# Patient Record
Sex: FEMALE | Race: BLACK OR AFRICAN AMERICAN | NOT HISPANIC OR LATINO | Employment: UNEMPLOYED | URBAN - METROPOLITAN AREA
[De-identification: names, ages, dates, MRNs, and addresses within clinical notes are randomized per-mention and may not be internally consistent; named-entity substitution may affect disease eponyms.]

---

## 2017-08-18 ENCOUNTER — OFFICE VISIT (OUTPATIENT)
Dept: GASTROENTEROLOGY | Facility: CLINIC | Age: 32
End: 2017-08-18
Payer: COMMERCIAL

## 2017-08-18 VITALS
BODY MASS INDEX: 31.89 KG/M2 | WEIGHT: 191.38 LBS | HEIGHT: 65 IN | SYSTOLIC BLOOD PRESSURE: 126 MMHG | HEART RATE: 88 BPM | DIASTOLIC BLOOD PRESSURE: 84 MMHG

## 2017-08-18 DIAGNOSIS — R10.84 GENERALIZED ABDOMINAL PAIN: ICD-10-CM

## 2017-08-18 DIAGNOSIS — R14.0 ABDOMINAL BLOATING: ICD-10-CM

## 2017-08-18 DIAGNOSIS — K59.00 CONSTIPATION, UNSPECIFIED CONSTIPATION TYPE: Primary | ICD-10-CM

## 2017-08-18 PROCEDURE — 99999 PR PBB SHADOW E&M-EST. PATIENT-LVL III: CPT | Mod: PBBFAC,,, | Performed by: NURSE PRACTITIONER

## 2017-08-18 PROCEDURE — 99204 OFFICE O/P NEW MOD 45 MIN: CPT | Mod: S$GLB,,, | Performed by: NURSE PRACTITIONER

## 2017-08-18 PROCEDURE — 3008F BODY MASS INDEX DOCD: CPT | Mod: S$GLB,,, | Performed by: NURSE PRACTITIONER

## 2017-08-18 RX ORDER — LUBIPROSTONE 8 UG/1
8 CAPSULE ORAL 2 TIMES DAILY
Qty: 60 CAPSULE | Refills: 5 | Status: SHIPPED | OUTPATIENT
Start: 2017-08-18 | End: 2018-03-15

## 2017-08-18 RX ORDER — SODIUM, POTASSIUM,MAG SULFATES 17.5-3.13G
SOLUTION, RECONSTITUTED, ORAL ORAL
Qty: 354 ML | Refills: 0 | Status: SHIPPED | OUTPATIENT
Start: 2017-08-18 | End: 2017-08-26

## 2017-08-21 NOTE — PROGRESS NOTES
Clinic Consult:  [facility] Consultation Note    Reason for Consult:  The primary encounter diagnosis was Constipation, unspecified constipation type. Diagnoses of Generalized abdominal pain and Abdominal bloating were also pertinent to this visit.    PCP: Paola Moreno       HPI:  This is a 32 y.o. female here for evaluation of abdominal pain and constipation. Onset was 8 months ago. She reports significant constipation despite drinking water and eating fruits and vegetables. She has associated abdominal pain and bloating and rectal pain. She denies any narcotic use. She has tried a bottle of magnesium citrate without a bowel movement on Wednesday. She also tried prune juice and Miralax without relief. She did have a small bowel movement with an enema. She denies any hematochezia, melena, nausea, or vomiting. No family history of colon cancer.     Review of Systems   Constitutional: Negative for fever, malaise/fatigue and weight loss.   HENT: Negative for sore throat.    Respiratory: Negative for cough and wheezing.    Cardiovascular: Negative for chest pain and palpitations.   Gastrointestinal: Positive for abdominal pain (generalized) and constipation. Negative for blood in stool, diarrhea, heartburn, melena, nausea and vomiting.   Genitourinary: Negative for dysuria and frequency.   Musculoskeletal: Negative for back pain, joint pain, myalgias and neck pain.   Skin: Negative for itching and rash.   Neurological: Negative for dizziness, speech change, seizures, loss of consciousness and headaches.   Psychiatric/Behavioral: Negative for depression and substance abuse. The patient is not nervous/anxious.        Medical History:  has no past medical history on file.    Surgical History:  has a past surgical history that includes Tummy Tuck;  section; and Tubal ligation.    Family History: family history is not on file..     Social History:  reports that she has never smoked. She has never used  "smokeless tobacco. She reports that she drinks alcohol. She reports that she does not use drugs.    Allergies: Reviewed    Home Medications:   No current outpatient prescriptions on file prior to visit.     No current facility-administered medications on file prior to visit.        Physical Exam:  Vital Signs:  /84   Pulse 88   Ht 5' 5" (1.651 m)   Wt 86.8 kg (191 lb 5.8 oz)   BMI 31.84 kg/m²   Body mass index is 31.84 kg/m².  Physical Exam   Constitutional: She is oriented to person, place, and time and well-developed, well-nourished, and in no distress. No distress.   HENT:   Head: Normocephalic.   Eyes: Conjunctivae are normal. Pupils are equal, round, and reactive to light.   Cardiovascular: Normal rate, regular rhythm and normal heart sounds.    Pulmonary/Chest: Effort normal and breath sounds normal. No respiratory distress.   Abdominal: Soft. Bowel sounds are normal. She exhibits no distension. There is no tenderness.   Neurological: She is alert and oriented to person, place, and time. No cranial nerve deficit.   Skin: Skin is warm and dry. No rash noted.   Psychiatric: Mood and affect normal.       Labs: Pertinent labs reviewed.    Assessment:  1. Constipation, unspecified constipation type    2. Generalized abdominal pain    3. Abdominal bloating           Recommendations:  Symptoms likely due to constipation. Failure with Miralax and other OTC preparations. Will try initial clean out with Suprep. She is then to start Amitiza. Will get an abdominal xray to assess the extent of constipation.   -     SUPREP BOWEL PREP KIT 17.5-3.13-1.6 gram SolR; Use as directed  Dispense: 354 mL; Refill: 0  -     lubiprostone (AMITIZA) 8 MCG Cap; Take 1 capsule (8 mcg total) by mouth 2 (two) times daily.  Dispense: 60 capsule; Refill: 5    Return if symptoms worsen or fail to improve.    Thank you so much for allowing me to participate in the care of MERE Pina  "

## 2017-08-26 ENCOUNTER — HOSPITAL ENCOUNTER (EMERGENCY)
Facility: HOSPITAL | Age: 32
Discharge: HOME | End: 2017-08-27
Attending: EMERGENCY MEDICINE
Payer: COMMERCIAL

## 2017-08-26 DIAGNOSIS — R51.9 ACUTE NONINTRACTABLE HEADACHE, UNSPECIFIED HEADACHE TYPE: Primary | ICD-10-CM

## 2017-08-26 PROCEDURE — 96372 THER/PROPH/DIAG INJ SC/IM: CPT

## 2017-08-26 PROCEDURE — 63600175 PHARM REV CODE 636 W HCPCS: Performed by: EMERGENCY MEDICINE

## 2017-08-26 PROCEDURE — 99283 EMERGENCY DEPT VISIT LOW MDM: CPT | Mod: 25

## 2017-08-26 PROCEDURE — 25000003 PHARM REV CODE 250: Performed by: EMERGENCY MEDICINE

## 2017-08-26 RX ORDER — ONDANSETRON 4 MG/1
4 TABLET, ORALLY DISINTEGRATING ORAL
Status: COMPLETED | OUTPATIENT
Start: 2017-08-26 | End: 2017-08-26

## 2017-08-26 RX ORDER — METOCLOPRAMIDE HYDROCHLORIDE 5 MG/ML
10 INJECTION INTRAMUSCULAR; INTRAVENOUS
Status: COMPLETED | OUTPATIENT
Start: 2017-08-26 | End: 2017-08-26

## 2017-08-26 RX ORDER — BUTALBITAL, ACETAMINOPHEN AND CAFFEINE 50; 325; 40 MG/1; MG/1; MG/1
2 TABLET ORAL
Status: COMPLETED | OUTPATIENT
Start: 2017-08-26 | End: 2017-08-26

## 2017-08-26 RX ADMIN — ONDANSETRON 4 MG: 4 TABLET, ORALLY DISINTEGRATING ORAL at 10:08

## 2017-08-26 RX ADMIN — METOCLOPRAMIDE 10 MG: 5 INJECTION, SOLUTION INTRAMUSCULAR; INTRAVENOUS at 11:08

## 2017-08-26 RX ADMIN — BUTALBITAL, ACETAMINOPHEN, AND CAFFEINE 2 TABLET: 50; 325; 40 TABLET ORAL at 10:08

## 2017-08-27 VITALS
RESPIRATION RATE: 18 BRPM | TEMPERATURE: 98 F | HEART RATE: 80 BPM | HEIGHT: 64 IN | OXYGEN SATURATION: 100 % | BODY MASS INDEX: 32.27 KG/M2 | WEIGHT: 189 LBS | SYSTOLIC BLOOD PRESSURE: 135 MMHG | DIASTOLIC BLOOD PRESSURE: 91 MMHG

## 2017-08-27 RX ORDER — BUTALBITAL, ACETAMINOPHEN AND CAFFEINE 50; 325; 40 MG/1; MG/1; MG/1
1 TABLET ORAL EVERY 4 HOURS PRN
Qty: 12 TABLET | Refills: 0 | Status: SHIPPED | OUTPATIENT
Start: 2017-08-27 | End: 2017-09-26

## 2017-08-27 NOTE — ED PROVIDER NOTES
Encounter Date: 2017       History     Chief Complaint   Patient presents with    Headache     headache started this morning, took tylenol and motrin earlier without relief     The history is provided by the patient.   Headache    This is a new problem. The current episode started today. The problem occurs constantly. The problem has been unchanged. The pain is located in the bilateral and retro-orbital region. The pain does not radiate. The pain quality is similar to prior headaches. The quality of the pain is described as throbbing. The pain is at a severity of 6/10. Pertinent negatives include no abnormal behavior, anorexia, coughing, dizziness, drainage, facial sweating, nausea, phonophobia, photophobia, scalp tenderness, seizures, sinus pressure or sore throat.     Review of patient's allergies indicates:  No Known Allergies  Past Medical History:   Diagnosis Date    Migraine      Past Surgical History:   Procedure Laterality Date     SECTION      TUBAL LIGATION      Tummy Tuck       History reviewed. No pertinent family history.  Social History   Substance Use Topics    Smoking status: Never Smoker    Smokeless tobacco: Never Used    Alcohol use Yes      Comment: socially     Review of Systems   HENT: Negative for sinus pressure and sore throat.    Eyes: Negative for photophobia.   Respiratory: Negative for cough.    Gastrointestinal: Negative for anorexia and nausea.   Neurological: Positive for headaches. Negative for dizziness and seizures.   All other systems reviewed and are negative.      Physical Exam     Initial Vitals [17 2219]   BP Pulse Resp Temp SpO2   (!) 131/104 80 18 98.2 °F (36.8 °C) 100 %      MAP       113         Physical Exam    Nursing note and vitals reviewed.  Constitutional: She appears well-developed and well-nourished.   HENT:   Head: Normocephalic and atraumatic.   Eyes: EOM are normal.   Neck: Normal range of motion. Neck supple.   Cardiovascular: Normal  rate, regular rhythm, normal heart sounds and intact distal pulses.   Pulmonary/Chest: Breath sounds normal.   Abdominal: Soft.   Musculoskeletal: Normal range of motion.   Neurological: She is alert and oriented to person, place, and time.   Skin: Skin is warm and dry. Capillary refill takes less than 2 seconds.   Psychiatric: She has a normal mood and affect. Her behavior is normal. Judgment and thought content normal.         ED Course   Procedures  Labs Reviewed - No data to display          Medical Decision Making:   ED Management:  Pain resolved with Fioricet and Reglan                   ED Course     Clinical Impression:   There were no encounter diagnoses.    Disposition:   Disposition: Discharged  Condition: Stable                        Griselda Buckner MD  08/27/17 0014

## 2018-01-17 ENCOUNTER — CLINICAL SUPPORT (OUTPATIENT)
Dept: CARDIOLOGY | Facility: CLINIC | Age: 33
End: 2018-01-17
Payer: COMMERCIAL

## 2018-01-17 ENCOUNTER — OFFICE VISIT (OUTPATIENT)
Dept: CARDIOLOGY | Facility: CLINIC | Age: 33
End: 2018-01-17
Payer: COMMERCIAL

## 2018-01-17 ENCOUNTER — HOSPITAL ENCOUNTER (OUTPATIENT)
Dept: RADIOLOGY | Facility: HOSPITAL | Age: 33
Discharge: HOME | End: 2018-01-17
Attending: INTERNAL MEDICINE
Payer: COMMERCIAL

## 2018-01-17 VITALS — DIASTOLIC BLOOD PRESSURE: 80 MMHG | HEART RATE: 88 BPM | HEIGHT: 64 IN | SYSTOLIC BLOOD PRESSURE: 114 MMHG

## 2018-01-17 DIAGNOSIS — R07.89 OTHER CHEST PAIN: ICD-10-CM

## 2018-01-17 DIAGNOSIS — R07.9 CHEST PAIN, UNSPECIFIED TYPE: Primary | ICD-10-CM

## 2018-01-17 DIAGNOSIS — R07.89 OTHER CHEST PAIN: Primary | ICD-10-CM

## 2018-01-17 DIAGNOSIS — R07.9 CHEST PAIN, UNSPECIFIED TYPE: ICD-10-CM

## 2018-01-17 PROCEDURE — 93000 ELECTROCARDIOGRAM COMPLETE: CPT | Mod: S$GLB,,, | Performed by: INTERNAL MEDICINE

## 2018-01-17 PROCEDURE — 71046 X-RAY EXAM CHEST 2 VIEWS: CPT | Mod: 26,,, | Performed by: RADIOLOGY

## 2018-01-17 PROCEDURE — 99204 OFFICE O/P NEW MOD 45 MIN: CPT | Mod: S$GLB,,, | Performed by: INTERNAL MEDICINE

## 2018-01-17 PROCEDURE — 71046 X-RAY EXAM CHEST 2 VIEWS: CPT | Mod: TC,FY,PO

## 2018-01-17 PROCEDURE — 99999 PR PBB SHADOW E&M-EST. PATIENT-LVL III: CPT | Mod: PBBFAC,,, | Performed by: INTERNAL MEDICINE

## 2018-01-17 NOTE — PROGRESS NOTES
Subjective:   Patient ID:  Priya Conrad is a 32 y.o. female who presents for cardiac consult of Chest Pain      HPI  The patient came in today for cardiac consult of Chest Pain    18 Visit    This is a 32 year old female pt with PMHx migraines presents for initial CV evaluation for chest pain.     She complains of substernal central chest pain. Pain feels like tightness almost like squeezing. Symptoms started this AM after waking up. Pain this AM severe 10/10, now around 6. Did not take any pain meds. Pt has felt this pain before, felt it last month. NO relation to food. Usually occurs when laying on back. No SOB. In room pain is positional as well.     Pt has been having migraine headaches, is taking Fiorcets prn.     Patient feels no sob, no leg swelling, no PND, no palpitation, no dizziness, no syncope, no CNS symptoms.    Patient has fairly good exercise tolerance. Is very actively, no CP/SOB with exertion.     Patient is compliant with medications.    18 ECG  NSR with sinus arrythmia    FH - Mother - CAD, Maternal GF and siblings - had CHF,  from CHF/VT?    Past Medical History:   Diagnosis Date    Heart murmur     Migraine        Past Surgical History:   Procedure Laterality Date     SECTION      TUBAL LIGATION      Tummy Tuck         Social History   Substance Use Topics    Smoking status: Never Smoker    Smokeless tobacco: Never Used    Alcohol use Yes      Comment: socially       Family History   Problem Relation Age of Onset    Hypertension Mother     Heart failure Mother        Patient's Medications   New Prescriptions    No medications on file   Previous Medications    LUBIPROSTONE (AMITIZA) 8 MCG CAP    Take 1 capsule (8 mcg total) by mouth 2 (two) times daily.   Modified Medications    No medications on file   Discontinued Medications    No medications on file       Review of Systems   Constitutional: Negative.    HENT: Negative.    Eyes: Negative.    Respiratory:  "Negative.    Cardiovascular: Positive for chest pain.   Gastrointestinal: Negative.    Genitourinary: Negative.    Musculoskeletal: Negative.    Skin: Negative.    Neurological: Positive for headaches.   Endo/Heme/Allergies: Negative.    Psychiatric/Behavioral: Negative.    All 12 systems otherwise negative.      Wt Readings from Last 3 Encounters:   08/26/17 85.7 kg (189 lb)   08/18/17 86.8 kg (191 lb 5.8 oz)   08/09/16 81.6 kg (180 lb)     Temp Readings from Last 3 Encounters:   08/27/17 98.1 °F (36.7 °C) (Oral)   08/09/16 98.5 °F (36.9 °C)   02/21/16 99 °F (37.2 °C) (Oral)     BP Readings from Last 3 Encounters:   01/17/18 114/80   08/27/17 (!) 135/91   08/18/17 126/84     Pulse Readings from Last 3 Encounters:   01/17/18 88   08/27/17 80   08/18/17 88       /80 (BP Method: Large (Manual))   Pulse 88   Ht 5' 4" (1.626 m)     Objective:   Physical Exam   Constitutional: She is oriented to person, place, and time. She appears well-developed and well-nourished. No distress.   HENT:   Head: Normocephalic and atraumatic.   Nose: Nose normal.   Mouth/Throat: Oropharynx is clear and moist.   Eyes: Conjunctivae and EOM are normal. No scleral icterus.   Neck: Normal range of motion. Neck supple. No JVD present. No thyromegaly present.   Cardiovascular: Normal rate, regular rhythm, S1 normal and S2 normal.  Exam reveals no gallop, no S3, no S4 and no friction rub.    No murmur heard.  Pulmonary/Chest: Effort normal and breath sounds normal. No stridor. No respiratory distress. She has no wheezes. She has no rales. She exhibits no tenderness.   Abdominal: Soft. Bowel sounds are normal. She exhibits no distension and no mass. There is no tenderness. There is no rebound.   Genitourinary:   Genitourinary Comments: Deferred   Musculoskeletal: Normal range of motion. She exhibits no edema, tenderness or deformity.   Lymphadenopathy:     She has no cervical adenopathy.   Neurological: She is alert and oriented to person, " place, and time. She exhibits normal muscle tone. Coordination normal.   Skin: Skin is warm and dry. No rash noted. She is not diaphoretic. No erythema. No pallor.   Psychiatric: She has a normal mood and affect. Her behavior is normal. Judgment and thought content normal.   Nursing note and vitals reviewed.      Lab Results   Component Value Date     10/17/2016    K 4.4 10/17/2016     10/17/2016    CO2 30 (H) 10/17/2016    BUN 10 10/17/2016    CREATININE 0.87 10/17/2016    GLU 91 10/17/2016    AST 40 10/17/2016    AST 26 02/21/2016    ALT 35 10/17/2016    ALBUMIN 3.9 10/17/2016    PROT 7.4 10/17/2016    BILITOT 0.4 10/17/2016    WBC 4.84 10/17/2016    HGB 12.4 10/17/2016    HCT 38.9 10/17/2016    MCV 91 10/17/2016     10/17/2016    INR 1.1 02/21/2016    TSH 0.895 10/17/2016    BNP <11 02/21/2016     Assessment:      1. Chest pain, unspecified type        Plan:   1. Chest pain, atypical  - will check 2D ECHO and CXR   - seems likely MSK related as well somewhat positional  - also consider GERD/GI related  - told to take NSAIDS TID x 3-4 days    2. Headaches  - will see PCP for further eval    Thank you for allowing me to participate in this patient's care. Please do not hesitate to contact me with any questions or concerns. Consult note has been forwarded to the referral physician.

## 2018-01-22 ENCOUNTER — TELEPHONE (OUTPATIENT)
Dept: CARDIOLOGY | Facility: CLINIC | Age: 33
End: 2018-01-22

## 2018-01-22 NOTE — TELEPHONE ENCOUNTER
Pt canceled echo and requested that order be removed.  Due to her insurance deductible being so high she stated that she did not want to have it done.  Order removed and appointment fortino'steve.

## 2018-03-15 ENCOUNTER — OFFICE VISIT (OUTPATIENT)
Dept: INTERNAL MEDICINE | Facility: CLINIC | Age: 33
End: 2018-03-15
Payer: COMMERCIAL

## 2018-03-15 VITALS
TEMPERATURE: 99 F | DIASTOLIC BLOOD PRESSURE: 74 MMHG | WEIGHT: 182.31 LBS | OXYGEN SATURATION: 98 % | HEART RATE: 75 BPM | BODY MASS INDEX: 31.12 KG/M2 | SYSTOLIC BLOOD PRESSURE: 126 MMHG | HEIGHT: 64 IN

## 2018-03-15 DIAGNOSIS — J02.9 PHARYNGITIS, UNSPECIFIED ETIOLOGY: Primary | ICD-10-CM

## 2018-03-15 LAB — DEPRECATED S PYO AG THROAT QL EIA: NEGATIVE

## 2018-03-15 PROCEDURE — 96372 THER/PROPH/DIAG INJ SC/IM: CPT | Mod: S$GLB,,, | Performed by: FAMILY MEDICINE

## 2018-03-15 PROCEDURE — 87880 STREP A ASSAY W/OPTIC: CPT

## 2018-03-15 PROCEDURE — 87081 CULTURE SCREEN ONLY: CPT

## 2018-03-15 PROCEDURE — 99999 PR PBB SHADOW E&M-EST. PATIENT-LVL III: CPT | Mod: PBBFAC,,, | Performed by: NURSE PRACTITIONER

## 2018-03-15 PROCEDURE — 99204 OFFICE O/P NEW MOD 45 MIN: CPT | Mod: 25,S$GLB,, | Performed by: NURSE PRACTITIONER

## 2018-03-15 RX ORDER — METHYLPREDNISOLONE ACETATE 40 MG/ML
40 INJECTION, SUSPENSION INTRA-ARTICULAR; INTRALESIONAL; INTRAMUSCULAR; SOFT TISSUE
Status: COMPLETED | OUTPATIENT
Start: 2018-03-15 | End: 2018-03-15

## 2018-03-15 RX ORDER — MONTELUKAST SODIUM 10 MG/1
10 TABLET ORAL NIGHTLY
Qty: 30 TABLET | Refills: 0 | Status: SHIPPED | OUTPATIENT
Start: 2018-03-15 | End: 2018-04-14

## 2018-03-15 RX ADMIN — METHYLPREDNISOLONE ACETATE 40 MG: 40 INJECTION, SUSPENSION INTRA-ARTICULAR; INTRALESIONAL; INTRAMUSCULAR; SOFT TISSUE at 12:03

## 2018-03-15 NOTE — PROGRESS NOTES
Subjective:       Patient ID: Priya Conrad is a 32 y.o. female.    Chief Complaint: Sore Throat and Headache    Patient presents for sore throat and headache.      Sore Throat    This is a new problem. The current episode started yesterday. The problem has been gradually worsening. The pain is worse on the left side. There has been no fever. The pain is at a severity of 6/10. Associated symptoms include ear pain, headaches and trouble swallowing (due to pain). Pertinent negatives include no congestion, coughing, diarrhea, ear discharge, neck pain, shortness of breath or vomiting. Exposure to:  sick last week. She has tried nothing for the symptoms.   Headache    Associated symptoms include ear pain and a sore throat. Pertinent negatives include no coughing, eye pain, fever, nausea, neck pain or vomiting.     Review of Systems   Constitutional: Positive for chills. Negative for fever.   HENT: Positive for ear pain, sore throat and trouble swallowing (due to pain). Negative for congestion and ear discharge.    Eyes: Negative for pain.   Respiratory: Negative for cough, shortness of breath and wheezing.    Cardiovascular: Negative for chest pain and palpitations.   Gastrointestinal: Negative for diarrhea, nausea and vomiting.   Musculoskeletal: Negative for neck pain.   Skin: Negative for rash.   Allergic/Immunologic: Negative for environmental allergies.   Neurological: Positive for headaches. Negative for tremors.   Psychiatric/Behavioral: Negative for dysphoric mood and sleep disturbance.       Objective:      Physical Exam   Constitutional: She is oriented to person, place, and time. She appears well-developed and well-nourished.   HENT:   Head: Normocephalic and atraumatic.   Right Ear: Tympanic membrane and ear canal normal.   Left Ear: Tympanic membrane and ear canal normal.   Nose: Mucosal edema and rhinorrhea present. Right sinus exhibits no maxillary sinus tenderness and no frontal sinus  tenderness. Left sinus exhibits no maxillary sinus tenderness and no frontal sinus tenderness.   Mouth/Throat: Uvula is midline and mucous membranes are normal. Posterior oropharyngeal erythema (mild, streaking consistent with PND) present.   Pale boggy nasal turbinates with clear mucosa.    Eyes: Conjunctivae and EOM are normal. Right eye exhibits no discharge. Left eye exhibits no discharge.   Neck: Normal range of motion. Neck supple.   Cardiovascular: Normal rate and regular rhythm.  Exam reveals no friction rub.    No murmur heard.  Pulmonary/Chest: Effort normal and breath sounds normal. No respiratory distress. She has no wheezes.   Lymphadenopathy:     She has no cervical adenopathy.   Neurological: She is alert and oriented to person, place, and time.   Skin: Skin is warm and dry. No rash noted.   Vitals reviewed.      Assessment:       1. Pharyngitis, unspecified etiology        Plan:   Pharyngitis, unspecified etiology  -     Throat Screen, Rapid  -     methylPREDNISolone acetate injection 40 mg; Inject 1 mL (40 mg total) into the muscle one time.  -     montelukast (SINGULAIR) 10 mg tablet; Take 1 tablet (10 mg total) by mouth every evening.  Dispense: 30 tablet; Refill: 0    Other orders  -     Strep A culture, throat      Strep negative.  Follow-up if symptoms worsen or fail to improve.

## 2018-03-18 LAB — BACTERIA THROAT CULT: NORMAL

## 2018-03-19 ENCOUNTER — TELEPHONE (OUTPATIENT)
Dept: INTERNAL MEDICINE | Facility: CLINIC | Age: 33
End: 2018-03-19

## 2018-03-19 NOTE — TELEPHONE ENCOUNTER
----- Message from Mei Howard NP sent at 3/16/2018  8:20 AM CDT -----  Please notify patient no strep grown in culture.

## 2019-04-04 ENCOUNTER — HOSPITAL ENCOUNTER (OUTPATIENT)
Dept: RADIOLOGY | Facility: OTHER | Age: 34
Discharge: HOME | End: 2019-04-04
Attending: FAMILY MEDICINE
Payer: COMMERCIAL

## 2019-04-04 VITALS — WEIGHT: 182 LBS | BODY MASS INDEX: 31.07 KG/M2 | HEIGHT: 64 IN

## 2019-04-04 DIAGNOSIS — N64.4 BREAST PAIN: ICD-10-CM

## 2019-04-04 DIAGNOSIS — N64.4 MASTODYNIA: ICD-10-CM

## 2019-04-04 PROCEDURE — 77062 BREAST TOMOSYNTHESIS BI: CPT | Mod: 26,,, | Performed by: RADIOLOGY

## 2019-04-04 PROCEDURE — 77066 DX MAMMO INCL CAD BI: CPT | Mod: 26,,, | Performed by: RADIOLOGY

## 2019-04-04 PROCEDURE — 77066 MAMMO DIGITAL DIAGNOSTIC BILAT WITH TOMOSYNTHESIS_CAD: ICD-10-PCS | Mod: 26,,, | Performed by: RADIOLOGY

## 2019-04-04 PROCEDURE — 77062 MAMMO DIGITAL DIAGNOSTIC BILAT WITH TOMOSYNTHESIS_CAD: ICD-10-PCS | Mod: 26,,, | Performed by: RADIOLOGY

## 2019-04-04 PROCEDURE — 76642 ULTRASOUND BREAST LIMITED: CPT | Mod: TC,LT

## 2019-04-04 PROCEDURE — 77062 BREAST TOMOSYNTHESIS BI: CPT | Mod: TC

## 2019-04-04 PROCEDURE — 76642 ULTRASOUND BREAST LIMITED: CPT | Mod: 26,LT,, | Performed by: RADIOLOGY

## 2019-04-04 PROCEDURE — 76642 US BREAST LEFT LIMITED: ICD-10-PCS | Mod: 26,LT,, | Performed by: RADIOLOGY

## 2019-04-10 ENCOUNTER — OFFICE VISIT (OUTPATIENT)
Dept: SURGERY | Facility: CLINIC | Age: 34
End: 2019-04-10
Payer: COMMERCIAL

## 2019-04-10 VITALS
WEIGHT: 193.56 LBS | BODY MASS INDEX: 31.11 KG/M2 | HEIGHT: 66 IN | SYSTOLIC BLOOD PRESSURE: 131 MMHG | HEART RATE: 75 BPM | DIASTOLIC BLOOD PRESSURE: 87 MMHG

## 2019-04-10 DIAGNOSIS — K64.5 THROMBOSED EXTERNAL HEMORRHOID: Primary | ICD-10-CM

## 2019-04-10 PROCEDURE — 99204 OFFICE O/P NEW MOD 45 MIN: CPT | Mod: 25,S$GLB,, | Performed by: NURSE PRACTITIONER

## 2019-04-10 PROCEDURE — 99999 PR PBB SHADOW E&M-EST. PATIENT-LVL III: CPT | Mod: PBBFAC,,, | Performed by: NURSE PRACTITIONER

## 2019-04-10 PROCEDURE — 99204 PR OFFICE/OUTPT VISIT, NEW, LEVL IV, 45-59 MIN: ICD-10-PCS | Mod: 25,S$GLB,, | Performed by: NURSE PRACTITIONER

## 2019-04-10 PROCEDURE — 99999 PR PBB SHADOW E&M-EST. PATIENT-LVL III: ICD-10-PCS | Mod: PBBFAC,,, | Performed by: NURSE PRACTITIONER

## 2019-04-10 PROCEDURE — 46320 PR EXCISION THROMBOSED HEMORRHOID, EXTERNAL: ICD-10-PCS | Mod: S$GLB,,, | Performed by: NURSE PRACTITIONER

## 2019-04-10 PROCEDURE — 46320 REMOVAL OF HEMORRHOID CLOT: CPT | Mod: S$GLB,,, | Performed by: NURSE PRACTITIONER

## 2019-04-10 RX ORDER — HYDROCORTISONE ACETATE 25 MG/1
25 SUPPOSITORY RECTAL 2 TIMES DAILY
Qty: 20 SUPPOSITORY | Refills: 0 | Status: SHIPPED | OUTPATIENT
Start: 2019-04-10 | End: 2019-04-20

## 2019-04-10 RX ORDER — HYDROCORTISONE 25 MG/G
CREAM TOPICAL 2 TIMES DAILY
Qty: 20 G | Refills: 0 | Status: SHIPPED | OUTPATIENT
Start: 2019-04-10 | End: 2020-05-28

## 2019-04-10 NOTE — LETTER
April 10, 2019      [facility]  [address]  [facility] [address]  Phone: [phone]       Patient: Priya Acuna   YOB: 1985  Date of Visit: 04/10/2019    To Whom It May Concern:    KASSANDRA Acuna  was at [facility] on 04/10/2019. She may return to work/school on 4/11/2019 with no restrictions. If you have any questions or concerns, or if I can be of further assistance, please do not hesitate to contact me.    Sincerely,    Missy Kam NP

## 2019-04-10 NOTE — PROGRESS NOTES
Subjective:       Patient ID: Priya Acuna is a 33 y.o. female.    Chief Complaint: No chief complaint on file.    HPI   33 F who presents to clinic for rectal pain that began 2 days ago. Evaluated in urgent care yesterday, using creams with no relief. Struggled with constipation, tried amitiza with no improvement . Takes fiber supplement, colace. Had BM this AM. Feels like thrombosed hemorrhoid she has had in the past.       Colonoscopy: none  Family hx of colon or rectal cancer: none  Prior rectal surgeries:none    Review of Systems   Constitutional: Negative for fatigue, fever and unexpected weight change.   Respiratory: Negative for shortness of breath.    Cardiovascular: Negative for chest pain.   Gastrointestinal: Positive for rectal pain. Negative for abdominal distention, abdominal pain, anal bleeding, blood in stool, constipation, diarrhea, nausea and vomiting.       Objective:      Physical Exam   Constitutional: She is oriented to person, place, and time. She appears well-developed and well-nourished. No distress.   Eyes: Conjunctivae and EOM are normal.   Pulmonary/Chest: Effort normal. No respiratory distress.   Abdominal: Soft. She exhibits no distension. There is no tenderness.   Genitourinary:   Genitourinary Comments: Right lateral external hemorrhoid, thrombosed    Musculoskeletal: Normal range of motion.   Neurological: She is alert and oriented to person, place, and time.   Skin: Skin is warm and dry.   Psychiatric: She has a normal mood and affect. Her behavior is normal.       Assessment:       1. Thrombosed external hemorrhoid        Plan:       Procedure : Thrombosed External Hemorrhoid Excision    1.The procedure was explained to the patient and they gave verbal informed consent for excision of thrombosed external hemorrhoid    2. Area cleansed with betadine, anesthetized with subcutaneous infiltration of 1% lidocaine with epi, and incised with a scalpel, the tissue was extracted with  curved scissors and wound was closed with 4-0 vicryl suture. The area was then covered with dry gauze.    3. The patient tolerated the procedure well.    4. Postoperative instructions were given to patient.      ansuol cream and sup  Warm soaks  rtc prn

## 2020-04-07 DIAGNOSIS — R05.9 COUGH: Primary | ICD-10-CM

## 2020-04-08 ENCOUNTER — TELEPHONE (OUTPATIENT)
Dept: INTERNAL MEDICINE | Facility: CLINIC | Age: 35
End: 2020-04-08

## 2020-04-15 DIAGNOSIS — G47.9 SLEEP DISTURBANCE: Primary | ICD-10-CM

## 2020-04-15 RX ORDER — HYDROXYZINE HYDROCHLORIDE 25 MG/1
50 TABLET, FILM COATED ORAL NIGHTLY PRN
Qty: 28 TABLET | Refills: 0 | Status: SHIPPED | OUTPATIENT
Start: 2020-04-15 | End: 2020-04-29

## 2020-05-28 ENCOUNTER — LAB VISIT (OUTPATIENT)
Dept: LAB | Facility: HOSPITAL | Age: 35
End: 2020-05-28
Attending: HOSPITALIST
Payer: COMMERCIAL

## 2020-05-28 ENCOUNTER — TELEPHONE (OUTPATIENT)
Dept: INTERNAL MEDICINE | Facility: CLINIC | Age: 35
End: 2020-05-28

## 2020-05-28 ENCOUNTER — OFFICE VISIT (OUTPATIENT)
Dept: INTERNAL MEDICINE | Facility: CLINIC | Age: 35
End: 2020-05-28
Payer: COMMERCIAL

## 2020-05-28 VITALS
TEMPERATURE: 99 F | HEART RATE: 72 BPM | OXYGEN SATURATION: 99 % | WEIGHT: 182.13 LBS | RESPIRATION RATE: 16 BRPM | DIASTOLIC BLOOD PRESSURE: 70 MMHG | BODY MASS INDEX: 30.34 KG/M2 | SYSTOLIC BLOOD PRESSURE: 130 MMHG | HEIGHT: 65 IN

## 2020-05-28 DIAGNOSIS — Z00.00 ANNUAL PHYSICAL EXAM: Primary | ICD-10-CM

## 2020-05-28 DIAGNOSIS — K59.00 CONSTIPATION, UNSPECIFIED CONSTIPATION TYPE: ICD-10-CM

## 2020-05-28 DIAGNOSIS — F41.9 ANXIETY: ICD-10-CM

## 2020-05-28 DIAGNOSIS — Z00.00 ANNUAL PHYSICAL EXAM: ICD-10-CM

## 2020-05-28 LAB
25(OH)D3+25(OH)D2 SERPL-MCNC: 35 NG/ML (ref 30–96)
ALBUMIN SERPL BCP-MCNC: 3.5 G/DL (ref 3.5–5.2)
ALP SERPL-CCNC: 56 U/L (ref 55–135)
ALT SERPL W/O P-5'-P-CCNC: 26 U/L (ref 10–44)
ANION GAP SERPL CALC-SCNC: 6 MMOL/L (ref 8–16)
AST SERPL-CCNC: 26 U/L (ref 10–40)
BASOPHILS # BLD AUTO: 0.04 K/UL (ref 0–0.2)
BASOPHILS NFR BLD: 0.7 % (ref 0–1.9)
BILIRUB SERPL-MCNC: 0.3 MG/DL (ref 0.1–1)
BUN SERPL-MCNC: 8 MG/DL (ref 6–20)
CALCIUM SERPL-MCNC: 8.9 MG/DL (ref 8.7–10.5)
CHLORIDE SERPL-SCNC: 108 MMOL/L (ref 95–110)
CHOLEST SERPL-MCNC: 162 MG/DL (ref 120–199)
CHOLEST/HDLC SERPL: 3 {RATIO} (ref 2–5)
CO2 SERPL-SCNC: 26 MMOL/L (ref 23–29)
CREAT SERPL-MCNC: 0.9 MG/DL (ref 0.5–1.4)
DIFFERENTIAL METHOD: ABNORMAL
EOSINOPHIL # BLD AUTO: 0.1 K/UL (ref 0–0.5)
EOSINOPHIL NFR BLD: 1.6 % (ref 0–8)
ERYTHROCYTE [DISTWIDTH] IN BLOOD BY AUTOMATED COUNT: 15.6 % (ref 11.5–14.5)
EST. GFR  (AFRICAN AMERICAN): >60 ML/MIN/1.73 M^2
EST. GFR  (NON AFRICAN AMERICAN): >60 ML/MIN/1.73 M^2
ESTIMATED AVG GLUCOSE: 108 MG/DL (ref 68–131)
GLUCOSE SERPL-MCNC: 89 MG/DL (ref 70–110)
HBA1C MFR BLD HPLC: 5.4 % (ref 4–5.6)
HCT VFR BLD AUTO: 40.4 % (ref 37–48.5)
HDLC SERPL-MCNC: 54 MG/DL (ref 40–75)
HDLC SERPL: 33.3 % (ref 20–50)
HGB BLD-MCNC: 12.5 G/DL (ref 12–16)
IMM GRANULOCYTES # BLD AUTO: 0.02 K/UL (ref 0–0.04)
IMM GRANULOCYTES NFR BLD AUTO: 0.4 % (ref 0–0.5)
LDLC SERPL CALC-MCNC: 98.4 MG/DL (ref 63–159)
LYMPHOCYTES # BLD AUTO: 1.6 K/UL (ref 1–4.8)
LYMPHOCYTES NFR BLD: 28.6 % (ref 18–48)
MCH RBC QN AUTO: 29.3 PG (ref 27–31)
MCHC RBC AUTO-ENTMCNC: 30.9 G/DL (ref 32–36)
MCV RBC AUTO: 95 FL (ref 82–98)
MONOCYTES # BLD AUTO: 0.6 K/UL (ref 0.3–1)
MONOCYTES NFR BLD: 10.5 % (ref 4–15)
NEUTROPHILS # BLD AUTO: 3.3 K/UL (ref 1.8–7.7)
NEUTROPHILS NFR BLD: 58.2 % (ref 38–73)
NONHDLC SERPL-MCNC: 108 MG/DL
NRBC BLD-RTO: 0 /100 WBC
PLATELET # BLD AUTO: 265 K/UL (ref 150–350)
PMV BLD AUTO: 11.5 FL (ref 9.2–12.9)
POTASSIUM SERPL-SCNC: 4.1 MMOL/L (ref 3.5–5.1)
PROT SERPL-MCNC: 6.8 G/DL (ref 6–8.4)
RBC # BLD AUTO: 4.26 M/UL (ref 4–5.4)
SODIUM SERPL-SCNC: 140 MMOL/L (ref 136–145)
TRIGL SERPL-MCNC: 48 MG/DL (ref 30–150)
TSH SERPL DL<=0.005 MIU/L-ACNC: 1.13 UIU/ML (ref 0.4–4)
WBC # BLD AUTO: 5.63 K/UL (ref 3.9–12.7)

## 2020-05-28 PROCEDURE — 80061 LIPID PANEL: CPT

## 2020-05-28 PROCEDURE — 99999 PR PBB SHADOW E&M-EST. PATIENT-LVL IV: CPT | Mod: PBBFAC,,, | Performed by: HOSPITALIST

## 2020-05-28 PROCEDURE — 99395 PR PREVENTIVE VISIT,EST,18-39: ICD-10-PCS | Mod: S$GLB,,, | Performed by: HOSPITALIST

## 2020-05-28 PROCEDURE — 80053 COMPREHEN METABOLIC PANEL: CPT

## 2020-05-28 PROCEDURE — 85025 COMPLETE CBC W/AUTO DIFF WBC: CPT

## 2020-05-28 PROCEDURE — 99999 PR PBB SHADOW E&M-EST. PATIENT-LVL IV: ICD-10-PCS | Mod: PBBFAC,,, | Performed by: HOSPITALIST

## 2020-05-28 PROCEDURE — 36415 COLL VENOUS BLD VENIPUNCTURE: CPT | Mod: PO

## 2020-05-28 PROCEDURE — 84443 ASSAY THYROID STIM HORMONE: CPT

## 2020-05-28 PROCEDURE — 82306 VITAMIN D 25 HYDROXY: CPT

## 2020-05-28 PROCEDURE — 83036 HEMOGLOBIN GLYCOSYLATED A1C: CPT

## 2020-05-28 PROCEDURE — 99395 PREV VISIT EST AGE 18-39: CPT | Mod: S$GLB,,, | Performed by: HOSPITALIST

## 2020-05-28 RX ORDER — HYDROXYZINE HYDROCHLORIDE 25 MG/1
25 TABLET, FILM COATED ORAL NIGHTLY PRN
COMMUNITY
Start: 2020-04-15 | End: 2020-07-14

## 2020-05-28 RX ORDER — DICYCLOMINE HYDROCHLORIDE 20 MG/1
TABLET ORAL
COMMUNITY
Start: 2020-04-03 | End: 2020-12-27

## 2020-05-28 RX ORDER — AMLODIPINE BESYLATE 5 MG/1
10 TABLET ORAL DAILY
COMMUNITY
Start: 2020-04-15 | End: 2021-08-17

## 2020-05-28 RX ORDER — ALPRAZOLAM 0.25 MG/1
0.25 TABLET ORAL DAILY
COMMUNITY
Start: 2020-05-15 | End: 2020-07-06 | Stop reason: SDUPTHER

## 2020-05-28 RX ORDER — BUTALBITAL, ACETAMINOPHEN AND CAFFEINE 50; 325; 40 MG/1; MG/1; MG/1
1 TABLET ORAL 3 TIMES DAILY PRN
COMMUNITY
Start: 2020-04-03 | End: 2020-06-18 | Stop reason: SDUPTHER

## 2020-05-28 RX ORDER — PROPRANOLOL HYDROCHLORIDE 20 MG/1
20 TABLET ORAL 2 TIMES DAILY
COMMUNITY
Start: 2020-04-03 | End: 2020-07-14

## 2020-05-28 NOTE — PROGRESS NOTES
Subjective:     @Patient ID: Priya Conrad is a 34 y.o. female.    Chief Complaint: Annual Exam and Establish Care    HPI    34 y.o. female here for annual exam. Pt is new to me. She is changing PCPs. She works as a medical assistant for [employer]. Is  with 3 kids. Reports having anxiety and hx of panic attacks. Takes xanax only if needed. Also on propranolol. She also reports constipation and sometimes hemorrhoids. Does take stool softener.     Lipid disorders/ASCVD risk (ages >/= 45 or >/= 20 if increased risk ): ordered  DM (>45y yearly or if obese, HTN): A1c ordered  Eye exam: done 2019  Cervical Cancer (Pap Smear ages 21-65 every 3 years or Pap + HPV q5 years after 30 years of age): Pt reports  Done 2018 with Dr Rendon at [facility] in [place]. Will get records        Vaccines:   Influenza (yearly) n/a  Tetanus (every 10 yrs - 1st tdap)   declines          Review of Systems   Constitutional: Negative for chills and fever.   HENT: Negative for congestion and sore throat.    Eyes: Negative for pain and visual disturbance.   Respiratory: Negative for cough and shortness of breath.    Cardiovascular: Negative for chest pain and leg swelling.   Gastrointestinal: Positive for constipation. Negative for abdominal pain, nausea and vomiting.   Endocrine: Negative for polydipsia and polyuria.   Genitourinary: Negative for difficulty urinating and dysuria.   Musculoskeletal: Negative for arthralgias and back pain.   Skin: Negative for rash and wound.   Neurological: Negative for dizziness, weakness and headaches.   Psychiatric/Behavioral: Negative for agitation and confusion.     Past medical history, surgical history, and family medical history reviewed and updated as appropriate.    Medications and allergies reviewed.     Objective:     Vitals:    05/28/20 0827   BP: 130/70   BP Location: Right arm   Patient Position: Sitting   BP Method: Medium (Manual)   Pulse: 72   Resp: 16   Temp: 99 °F (37.2 °C)  "  TempSrc: Skin   SpO2: 99%   Weight: 82.6 kg (182 lb 1.6 oz)   Height: 5' 5" (1.651 m)     Body mass index is 30.3 kg/m².  Physical Exam   Constitutional: She is oriented to person, place, and time. She appears well-developed and well-nourished. No distress.   HENT:   Head: Normocephalic and atraumatic.   Right Ear: External ear normal.   Left Ear: External ear normal.   Mouth/Throat: Oropharynx is clear and moist. No oropharyngeal exudate.   Eyes: Conjunctivae are normal. Right eye exhibits no discharge. Left eye exhibits no discharge.   Neck: Normal range of motion. Neck supple.   Cardiovascular: Normal rate and regular rhythm. Exam reveals no friction rub.   No murmur heard.  Pulmonary/Chest: Effort normal and breath sounds normal.   Abdominal: Soft. Bowel sounds are normal. She exhibits no distension. There is no tenderness. There is no guarding.   Musculoskeletal: Normal range of motion. She exhibits no edema.   Neurological: She is alert and oriented to person, place, and time.   Skin: Skin is warm and dry.   Psychiatric: She has a normal mood and affect. Her behavior is normal.   Vitals reviewed.      Lab Results   Component Value Date    WBC 4.84 10/17/2016    HGB 12.4 10/17/2016    HCT 38.9 10/17/2016     10/17/2016    ALT 35 10/17/2016    AST 40 10/17/2016     10/17/2016    K 4.4 10/17/2016     10/17/2016    CREATININE 0.87 10/17/2016    BUN 10 10/17/2016    CO2 30 (H) 10/17/2016    TSH 0.895 10/17/2016    INR 1.1 02/21/2016       Assessment:     1. Annual physical exam    2. Anxiety    3. Constipation, unspecified constipation type      Plan:   Priya was seen today for annual exam and establish care.    Diagnoses and all orders for this visit:    Annual physical exam  -     Comprehensive metabolic panel; Future  -     CBC auto differential; Future  -     TSH; Future  -     Vitamin D; Future  -     Lipid Panel; Future  -     Urinalysis; Future  -     HEMOGLOBIN A1C; Future  -     " Ambulatory referral/consult to Obstetrics / Gynecology; Future    Anxiety  -     Ambulatory referral/consult to Psychiatry; Future    Constipation, unspecified constipation type       - Counseled on hydration, increase fiber in diet, and ok for stool softener prn    RTC 1 year prn     Luz Masters MD  Internal Medicine    5/28/2020

## 2020-05-28 NOTE — TELEPHONE ENCOUNTER
Please get pap records from:    Raffi Ac   Doctor in [place], [place]  Address: [address] [place] [address] [place] [address]  Phone: [phone]

## 2020-05-29 ENCOUNTER — PATIENT OUTREACH (OUTPATIENT)
Dept: ADMINISTRATIVE | Facility: HOSPITAL | Age: 35
End: 2020-05-29

## 2020-05-29 NOTE — LETTER
AUTHORIZATION FOR RELEASE OF   CONFIDENTIAL INFORMATION    Dear Dr. Ac,    We are seeing Priya Conrad, date of birth 1985, in the clinic at [facility]. Luz Masters MD is the patient's PCP. Priya Conrad has an outstanding lab/procedure at the time we reviewed her chart. In order to help keep her health information updated, she has authorized us to request the following medical record(s):        (  )  MAMMOGRAM                                      (  )  COLONOSCOPY      ( x )  PAP SMEAR                                         (  )  OUTSIDE LAB RESULTS     (  )  DEXA SCAN                                          (  )  EYE EXAM            (  )  FOOT EXAM                                          (  )  ENTIRE RECORD     (  )  OUTSIDE IMMUNIZATIONS                 (  )  _______________         Please fax records to [facility], Miriam C Azuoru, MD, [phone]     If you have any questions, please contact ARAM Penn at [phone].           Patient Name: Priya Conrad  : 1985  Patient Phone #: [phone]

## 2020-06-18 ENCOUNTER — PATIENT MESSAGE (OUTPATIENT)
Dept: INTERNAL MEDICINE | Facility: CLINIC | Age: 35
End: 2020-06-18

## 2020-06-18 DIAGNOSIS — R51.9 RECURRENT HEADACHE: Primary | ICD-10-CM

## 2020-06-18 RX ORDER — BUTALBITAL, ACETAMINOPHEN AND CAFFEINE 50; 325; 40 MG/1; MG/1; MG/1
1 TABLET ORAL 3 TIMES DAILY PRN
Qty: 30 TABLET | Refills: 0 | Status: SHIPPED | OUTPATIENT
Start: 2020-06-18 | End: 2020-10-16

## 2020-06-22 ENCOUNTER — PATIENT MESSAGE (OUTPATIENT)
Dept: INTERNAL MEDICINE | Facility: CLINIC | Age: 35
End: 2020-06-22

## 2020-07-07 ENCOUNTER — PATIENT MESSAGE (OUTPATIENT)
Dept: INTERNAL MEDICINE | Facility: CLINIC | Age: 35
End: 2020-07-07

## 2020-07-13 NOTE — PROGRESS NOTES
New Patient     SUBJECTIVE:  Patient ID: Priya Conrad   MRN: [id number]  Referred By: Dr. Luz Masters  Chief Complaint: Headache      History of Present Illness:   34 y.o. female with a PMHx of migraines, anxiety, HTN, constipation, hemorrhoids, who presents to clinic alone for evaluation of headaches.   PMHx negative for TBI, Meningitis, Aneurysms, Kidney Stones, asthma, GI bleed, osteoporosis, CAD/MI, CVA/TIA, DM, cancer, pregnancy   Family Hx  Positive for son with migraines    Headache History:  Previous Hx of HA/Onset - >10 years, worsening in late 20's  Location/Radiation - unilateral frontalis  Quality - throbbing, pulsating, pressure  Duration - 1 - 24 hours  Intensity (range) - 3-9/10  Frequency - 25/30 ha days per month, 6/30 are debilitating  Triggers - menstrual cycle, sunlight, alcohol  Aggravating Factors - light, sounds,   Alleviating Factors - lay down in a dark room, turn TV off, turn lights off  Recent Changes - no  Prodrome/Aura - no  Time of day of most headaches- anytime, upon awakening  Sleep - trouble falling and staying asleep, taking xanax and atarax to help with sleep but does not help, getting 4 hrs per night, no snoring  Last eye exam - over 1 year ago  Denies positional component    Associated symptoms with the headache: photophobia, phonophobia, unilateral tinnitus ipsilateral to HA, nausea, exercise intolerance, worsened by exertion    Treatments Tried   Propranolol  Flexeril  Ibuprofen (helps menstrual cycle HA's only)  hydroxyzine  fioricet - 0-4 per day  Tylenol - 0-2 per day    Social History  Alcohol - denies  Smoke - denies  Recreational Drug Use- denies    Current Medications:    Current Outpatient Medications:     ALPRAZolam (XANAX) 0.25 MG tablet, Take 1 tablet (0.25 mg total) by mouth daily as needed for Anxiety., Disp: 30 tablet, Rfl: 2    amLODIPine (NORVASC) 5 MG tablet, Take 5 mg by mouth once daily., Disp: , Rfl:     baclofen (LIORESAL) 10 MG tablet, Take 1  "tablet (10 mg total) by mouth every evening., Disp: 30 tablet, Rfl: 0    butalbital-acetaminophen-caffeine -40 mg (FIORICET, ESGIC) -40 mg per tablet, Take 1 tablet by mouth 3 (three) times daily as needed for Headaches., Disp: 30 tablet, Rfl: 0    diclofenac (VOLTAREN) 75 MG EC tablet, Take 1 tablet (75 mg total) by mouth 2 (two) times daily as needed., Disp: 20 tablet, Rfl: 3    dicyclomine (BENTYL) 20 mg tablet, TAKE 1 TABLET BY MOUTH THREE TIMES DAILY AS NEEDED FOR ABDOMINAL CRAMPS, Disp: , Rfl:     hydrOXYzine HCL (ATARAX) 25 MG tablet, Take 25 mg by mouth nightly as needed., Disp: , Rfl:     predniSONE (DELTASONE) 20 MG tablet, Take 3 tabs in the AM x 2 days then 2 tabs in AM x 2 days then 1 tab in AM x 2 days. Take with food., Disp: 12 tablet, Rfl: 0    propranoloL (INDERAL) 20 MG tablet, Take 1 tablet (20 mg total) by mouth 2 (two) times daily., Disp: 60 tablet, Rfl: 3    Review of Systems - as per HPI, otherwise a balanced 10 systems review is negative.    OBJECTIVE:  Vitals:  /84   Pulse 72   Ht 5' 5" (1.651 m)   Wt 84 kg (185 lb 3 oz)   BMI 30.82 kg/m²     Physical Exam   Constitutional: she appears well-developed and well-nourished. she is well groomed. NAD  HENT:    Head: Normocephalic and atraumatic, oral and nasal mucosa intact.  Frontalis was NTTP, temporalis was NTTP   Eyes: Conjunctivae and EOM are normal. Pupils are equal, round, and reactive to light   Neck: Neck supple. Occiput and trapezius NTTP, traps very tight   Musculoskeletal: Normal range of motion. No joint stiffness. No vertebral point tenderness.  Skin: Skin is warm and dry.  Psychiatric: Normal mood and affect.     Neuro exam:    Mental status:  The patient is alert and oriented to person, place and time.  Language is intact and fluent  Remote and recent memory are intact  Normal attention and concentration  Mood is stable    Cranial Nerves:  Fundoscopic examination does not reveal any occult papilledema.  "   Pupils are equal and reactive to light.    Extraocular movements are intact and without nystagmus.    Visual fields are full to confrontation testing.   Facial movement is symmetric.  Facial sensation is intact.    Hearing is intact to finger rub   FROM of neck in all (6) directions without pain  Shoulder shrug symmetrical.    Coordination:     Finger to nose - normal and symmetric bilaterally   Heel to shin test - normal and symmetric bilaterally     Motor:  Normal muscle bulk and symmetry. No fasciculations were noted.   Tremor not apparent   Pronator drift not apparent.    strength was strong and symmetric  Finger extension strength was strong and symmetric  RUE:appropriate against gravity and medium force as tested 5/5  LUE: appropriate against gravity and medium force as tested 5/5  RLE:appropriate against gravity and medium force as tested 5/5              LLE: appropriate against gravity and medium force as tested 5/5    Reflexes:  Right Brachioradialis 2+  Left Brachioradialis 2+  Right Biceps 2+  Left Biceps 2+  Right Patellar2+  Left Patellar 2+  Right Achilles 2+  Left Achilles 2+                          Mooney was negative    Sensory:  RUE  intact light touch  LUE intact light touch  RLE intact light touch  LLE intact light touch    Gait:   Romberg - negative  Normal gait  Tandem, Heel, and Toe Walk - able to perform without difficulty    Review of Data:   Notes from ED reviewed   Labs:  Lab Visit on 05/28/2020   Component Date Value Ref Range Status    Sodium 05/28/2020 140  136 - 145 mmol/L Final    Potassium 05/28/2020 4.1  3.5 - 5.1 mmol/L Final    Chloride 05/28/2020 108  95 - 110 mmol/L Final    CO2 05/28/2020 26  23 - 29 mmol/L Final    Glucose 05/28/2020 89  70 - 110 mg/dL Final    BUN, Bld 05/28/2020 8  6 - 20 mg/dL Final    Creatinine 05/28/2020 0.9  0.5 - 1.4 mg/dL Final    Calcium 05/28/2020 8.9  8.7 - 10.5 mg/dL Final    Total Protein 05/28/2020 6.8  6.0 - 8.4 g/dL Final     Albumin 05/28/2020 3.5  3.5 - 5.2 g/dL Final    Total Bilirubin 05/28/2020 0.3  0.1 - 1.0 mg/dL Final    Alkaline Phosphatase 05/28/2020 56  55 - 135 U/L Final    AST 05/28/2020 26  10 - 40 U/L Final    ALT 05/28/2020 26  10 - 44 U/L Final    Anion Gap 05/28/2020 6* 8 - 16 mmol/L Final    eGFR if African American 05/28/2020 >60.0  >60 mL/min/1.73 m^2 Final    eGFR if non African American 05/28/2020 >60.0  >60 mL/min/1.73 m^2 Final    WBC 05/28/2020 5.63  3.90 - 12.70 K/uL Final    RBC 05/28/2020 4.26  4.00 - 5.40 M/uL Final    Hemoglobin 05/28/2020 12.5  12.0 - 16.0 g/dL Final    Hematocrit 05/28/2020 40.4  37.0 - 48.5 % Final    Mean Corpuscular Volume 05/28/2020 95  82 - 98 fL Final    Mean Corpuscular Hemoglobin 05/28/2020 29.3  27.0 - 31.0 pg Final    Mean Corpuscular Hemoglobin Conc 05/28/2020 30.9* 32.0 - 36.0 g/dL Final    RDW 05/28/2020 15.6* 11.5 - 14.5 % Final    Platelets 05/28/2020 265  150 - 350 K/uL Final    MPV 05/28/2020 11.5  9.2 - 12.9 fL Final    Immature Granulocytes 05/28/2020 0.4  0.0 - 0.5 % Final    Gran # (ANC) 05/28/2020 3.3  1.8 - 7.7 K/uL Final    Immature Grans (Abs) 05/28/2020 0.02  0.00 - 0.04 K/uL Final    Lymph # 05/28/2020 1.6  1.0 - 4.8 K/uL Final    Mono # 05/28/2020 0.6  0.3 - 1.0 K/uL Final    Eos # 05/28/2020 0.1  0.0 - 0.5 K/uL Final    Baso # 05/28/2020 0.04  0.00 - 0.20 K/uL Final    nRBC 05/28/2020 0  0 /100 WBC Final    Gran% 05/28/2020 58.2  38.0 - 73.0 % Final    Lymph% 05/28/2020 28.6  18.0 - 48.0 % Final    Mono% 05/28/2020 10.5  4.0 - 15.0 % Final    Eosinophil% 05/28/2020 1.6  0.0 - 8.0 % Final    Basophil% 05/28/2020 0.7  0.0 - 1.9 % Final    Differential Method 05/28/2020 Automated   Final    TSH 05/28/2020 1.131  0.400 - 4.000 uIU/mL Final    Vit D, 25-Hydroxy 05/28/2020 35  30 - 96 ng/mL Final    Cholesterol 05/28/2020 162  120 - 199 mg/dL Final    Triglycerides 05/28/2020 48  30 - 150 mg/dL Final    HDL 05/28/2020 54  40 - 75  mg/dL Final    LDL Cholesterol 05/28/2020 98.4  63.0 - 159.0 mg/dL Final    Hdl/Cholesterol Ratio 05/28/2020 33.3  20.0 - 50.0 % Final    Total Cholesterol/HDL Ratio 05/28/2020 3.0  2.0 - 5.0 Final    Non-HDL Cholesterol 05/28/2020 108  mg/dL Final    Hemoglobin A1C 05/28/2020 5.4  4.0 - 5.6 % Final    Estimated Avg Glucose 05/28/2020 108  68 - 131 mg/dL Final   Lab Visit on 05/28/2020   Component Date Value Ref Range Status    Specimen UA 05/28/2020 Urine, Unspecified   Final    Color, UA 05/28/2020 Yellow  Yellow, Straw, Yana Final    Appearance, UA 05/28/2020 Clear  Clear Final    pH, UA 05/28/2020 5.0  5.0 - 8.0 Final    Specific [place], UA 05/28/2020 1.015  1.005 - 1.030 Final    Protein, UA 05/28/2020 Negative  Negative Final    Glucose, UA 05/28/2020 Negative  Negative Final    Ketones, UA 05/28/2020 Negative  Negative Final    Bilirubin (UA) 05/28/2020 Negative  Negative Final    Occult Blood UA 05/28/2020 1+* Negative Final    Nitrite, UA 05/28/2020 Negative  Negative Final    Leukocytes, UA 05/28/2020 Negative  Negative Final    RBC, UA 05/28/2020 3  0 - 4 /hpf Final    WBC, UA 05/28/2020 0  0 - 5 /hpf Final    Squam Epithel, UA 05/28/2020 1  /hpf Final    Microscopic Comment 05/28/2020 SEE COMMENT   Final     Imaging:  No results found for this or any previous visit.  Note: I have independently reviewed any/all imaging/labs/tests and agree with the report (s) as documented.  Any discrepancies will be as noted/demarcated by free text.  LEVY AMADOR 7/14/2020    ASSESSMENT:  1. Chronic migraine    2. Medication overuse headache    3. Recurrent headache    4. Hypertension, unspecified type    5. Anxiety    6. Constipation, unspecified constipation type    7. Myofascial pain          PLAN:  - Discussed symptoms appear to be consistent with migraines and med overuse HA, discussed treatment options and patient agreed with the following plan:  - ppx - increase propranolol to 20mg BID  -  abortive - d/c fioricet and tylenol, trial diclofenac (consider naproxen in future)  - med overuse HA - d/c fioricet and tylenol  - prednisone taper to bridge while d/c fioricet and to assist in breaking HA cycle  - muscle tension - referral to PT and trial of baclofen  - sleep hygiene discussed  - caution w/ cgrp inhibitors - hx of constipation   - anxiety - continue atarax and xanax, mgmt per pcp  - HTN - stable, continue amlodipine and propranolol, mgmt per pcp  - risks, benefits, and potential side effects of propranolol, fioricet, tylenol, diclofenac, steroid, baclofen discussed   - alternative treatment options offered   - importance of healthy diet, regular exercise and sleep hygiene in the treatment of headaches    - Start tracking headaches via Migraine [facility] seymour on phone   - RTC in 6 wks     Orders Placed This Encounter    Ambulatory referral/consult to Physical/Occupational Therapy    diclofenac (VOLTAREN) 75 MG EC tablet    baclofen (LIORESAL) 10 MG tablet    predniSONE (DELTASONE) 20 MG tablet    propranoloL (INDERAL) 20 MG tablet       I have discussed realistic goals of care with patient at length as well as medication options, and need for lifestyle adjustment. I have explained that treatment will take time. We have agreed that the goal will be to reduce frequency/intensity/quantity of HA, not to be completely HA free. I have explained my non narcotic policy regarding headache treatment.    Patient agreeable to work on lifestyle adjustments.    Questions and concerns were sought and answered to the patient's stated verbal satisfaction.  The patient verbalizes understanding and agreement with the above stated treatment plan.     CC: MD Clara Rodriguez PAAlexanderC  [facility]  [phone]    Dr. Connors was available during today's encounter.

## 2020-07-14 ENCOUNTER — OFFICE VISIT (OUTPATIENT)
Dept: NEUROLOGY | Facility: CLINIC | Age: 35
End: 2020-07-14
Payer: COMMERCIAL

## 2020-07-14 VITALS
SYSTOLIC BLOOD PRESSURE: 128 MMHG | WEIGHT: 185.19 LBS | BODY MASS INDEX: 30.85 KG/M2 | DIASTOLIC BLOOD PRESSURE: 84 MMHG | HEART RATE: 72 BPM | HEIGHT: 65 IN

## 2020-07-14 DIAGNOSIS — G44.40 MEDICATION OVERUSE HEADACHE: ICD-10-CM

## 2020-07-14 DIAGNOSIS — I10 HYPERTENSION, UNSPECIFIED TYPE: ICD-10-CM

## 2020-07-14 DIAGNOSIS — R51.9 RECURRENT HEADACHE: ICD-10-CM

## 2020-07-14 DIAGNOSIS — F41.9 ANXIETY: ICD-10-CM

## 2020-07-14 DIAGNOSIS — K59.00 CONSTIPATION, UNSPECIFIED CONSTIPATION TYPE: ICD-10-CM

## 2020-07-14 DIAGNOSIS — M79.18 MYOFASCIAL PAIN: ICD-10-CM

## 2020-07-14 PROCEDURE — 99999 PR PBB SHADOW E&M-EST. PATIENT-LVL V: CPT | Mod: PBBFAC,,, | Performed by: PHYSICIAN ASSISTANT

## 2020-07-14 PROCEDURE — 3008F PR BODY MASS INDEX (BMI) DOCUMENTED: ICD-10-PCS | Mod: CPTII,S$GLB,, | Performed by: PHYSICIAN ASSISTANT

## 2020-07-14 PROCEDURE — 99204 PR OFFICE/OUTPT VISIT, NEW, LEVL IV, 45-59 MIN: ICD-10-PCS | Mod: S$GLB,,, | Performed by: PHYSICIAN ASSISTANT

## 2020-07-14 PROCEDURE — 99204 OFFICE O/P NEW MOD 45 MIN: CPT | Mod: S$GLB,,, | Performed by: PHYSICIAN ASSISTANT

## 2020-07-14 PROCEDURE — 99999 PR PBB SHADOW E&M-EST. PATIENT-LVL V: ICD-10-PCS | Mod: PBBFAC,,, | Performed by: PHYSICIAN ASSISTANT

## 2020-07-14 PROCEDURE — 3008F BODY MASS INDEX DOCD: CPT | Mod: CPTII,S$GLB,, | Performed by: PHYSICIAN ASSISTANT

## 2020-07-14 RX ORDER — PROPRANOLOL HYDROCHLORIDE 20 MG/1
20 TABLET ORAL 2 TIMES DAILY
Qty: 60 TABLET | Refills: 3 | Status: SHIPPED | OUTPATIENT
Start: 2020-07-14 | End: 2020-10-16 | Stop reason: SDUPTHER

## 2020-07-14 RX ORDER — BACLOFEN 10 MG/1
10 TABLET ORAL NIGHTLY
Qty: 30 TABLET | Refills: 0 | Status: SHIPPED | OUTPATIENT
Start: 2020-07-14 | End: 2020-10-16

## 2020-07-14 RX ORDER — PREDNISONE 20 MG/1
TABLET ORAL
Qty: 12 TABLET | Refills: 0 | Status: SHIPPED | OUTPATIENT
Start: 2020-07-14 | End: 2020-10-16

## 2020-07-14 RX ORDER — DICLOFENAC SODIUM 75 MG/1
75 TABLET, DELAYED RELEASE ORAL 2 TIMES DAILY PRN
Qty: 20 TABLET | Refills: 3 | Status: SHIPPED | OUTPATIENT
Start: 2020-07-14 | End: 2021-08-17

## 2020-07-14 NOTE — PATIENT INSTRUCTIONS
Supplements for Migraine:  1. Magnesium Oxide - 400mg by mouth daily  2. Riboflavin (Vitamin B2) - 400mg by mouth daily  3. Coenzyme Q10 - 200mg tablet by mouth daily    Sleep Hygiene:    1. Avoid watching TV, eating, and discussing emotional issues in bed. The bed should be used for sleep and sex only. If not, we can associate the bed with other activities and it often becomes difficult to fall asleep.  2. Minimize noise, light, and temperature extremes during sleep with ear plugs, window blinds, or an electric blanket or air conditioner. Even the slightest nighttime noises or luminescent lights can disrupt the quality of your sleep. Try to keep your bedroom at a comfortable temperature -- not too hot (above 75 degrees) or too cold (below 54 degrees).  3. Try not to drink fluids after 8 p.m. This may reduce awakenings due to urination.  4. Avoid naps, but if you do nap, make it no more than about 25 minutes about eight hours after you awake. But if you have problems falling asleep, then no naps for you.  5. Do not expose your self to bright light if you need to get up at night. Use a small night-light instead.  6. Nicotine is a stimulant and should be avoided particularly near bedtime and upon night awakenings. Having a smoke before bed, although it may feel relaxing, is actually putting a stimulant into your bloodstream.  7. Caffeine is also a stimulant and is present in coffee (100-200 mg), soda (50-75 mg), tea (50-75 mg), and various over-the-counter medications. Caffeine should be discontinued at least four to six hours before bedtime. If you consume large amounts of caffeine and you cut your self off too quickly, beware; you may get headaches that could keep you awake.  8. Although alcohol is a depressant and may help you fall asleep, the subsequent metabolism that clears it from your body when you are sleeping causes a withdrawal syndrome. This withdrawal causes awakenings and is often associated with  nightmares and sweats.  9. A light snack may be sleep-inducing, but a heavy meal too close to bedtime interferes with sleep. Stay away from protein and stick to carbohydrates or dairy products. Milk contains the amino acid L-tryptophan, which has been shown in research to help people go to sleep. So milk and cookies or crackers (without chocolate) may be useful and taste good as well.

## 2020-07-14 NOTE — LETTER
July 14, 2020      Luz Masters MD  [address]  [place] [address]           [facility]  [address]  [place] [address]  Phone: [phone]  Fax: [phone]          Patient: Priya Conrad   MR Number: [id number]   YOB: 1985   Date of Visit: 7/14/2020       Dear Dr. Luz Masters:    Thank you for referring Priya Conrad to me for evaluation. Attached you will find relevant portions of my assessment and plan of care.    If you have questions, please do not hesitate to call me. I look forward to following Priya Conrad along with you.    Sincerely,    Clara Parish PA-C    Enclosure  CC:  No Recipients    If you would like to receive this communication electronically, please contact [email or web address] or [phone] to request more information on [facility] access.    For providers and/or their staff who would like to refer a patient to [facility], please contact us through our one-stop-shop provider referral line, [facility], at [phone].    If you feel you have received this communication in error or would no longer like to receive these types of communications, please e-mail [email or web address]

## 2020-07-21 ENCOUNTER — CLINICAL SUPPORT (OUTPATIENT)
Dept: REHABILITATION | Facility: HOSPITAL | Age: 35
End: 2020-07-21
Payer: COMMERCIAL

## 2020-07-21 DIAGNOSIS — M54.2 CERVICAL MUSCLE PAIN: ICD-10-CM

## 2020-07-21 DIAGNOSIS — M25.512 BILATERAL SHOULDER PAIN, UNSPECIFIED CHRONICITY: ICD-10-CM

## 2020-07-21 DIAGNOSIS — M79.18 MYOFASCIAL PAIN: ICD-10-CM

## 2020-07-21 DIAGNOSIS — M25.511 BILATERAL SHOULDER PAIN, UNSPECIFIED CHRONICITY: ICD-10-CM

## 2020-07-21 DIAGNOSIS — M62.89 MUSCLE TIGHTNESS: ICD-10-CM

## 2020-07-21 DIAGNOSIS — R29.3 POOR POSTURE: ICD-10-CM

## 2020-07-21 PROCEDURE — 97110 THERAPEUTIC EXERCISES: CPT | Mod: PO

## 2020-07-21 PROCEDURE — 97162 PT EVAL MOD COMPLEX 30 MIN: CPT | Mod: PO

## 2020-07-21 NOTE — PLAN OF CARE
[facility]  Physical Therapy Initial Evaluation    Date: 2020   Name: Priya Conrad  Clinic Number: [id number]    Therapy Diagnosis:   Encounter Diagnoses   Name Primary?    Myofascial pain     Muscle tightness     Cervical muscle pain     Bilateral shoulder pain, unspecified chronicity     Poor posture      Physician: Clara Parish PA-C    Physician Orders: PT Eval and Treat   Medical Diagnosis from Referral: M79.18 (ICD-10-CM) - Myofascial pain  Evaluation Date: 2020  Authorization Period Expiration: 2020  Plan of Care Expiration: 2020  Visit # / Visits authorized:     Time In: 11:15 am   Time Out: 12:00 pm   Total Appointment Time (timed & untimed codes): 45 minutes    Precautions: Standard    Subjective   Date of onset: years - chronic   History of current condition - PADILLA reports: she is having severe migraines that have caused a lot of tension in her neck and shoulder area. Patient states her migraines have been getting worse and her neurologist would like her to attend therapy to work on reduce her the cervical tension. Patient denies experiencing any numbness and tingling of (B)UE. Patient states she does have pain in her shoulders and upper arms. Patient states she does take prescribed pain medication that does help to reduce her migraines. Patient reports using a heating pad on her shoulder and cervical region for pain management. Patient states she does experiencing pain and difficulty in her neck and shoulders with OH activities, household ADL's, and fixing her hair.      Medical History:   Past Medical History:   Diagnosis Date    Anxiety     Heart murmur     Migraine        Surgical History:   Priya Conrad  has a past surgical history that includes Tummy Tuck;  section; and Tubal ligation.    Medications:   Priya has a current medication list which includes the following prescription(s): alprazolam, amlodipine, baclofen,  butalbital-acetaminophen-caffeine -40 mg, diclofenac, dicyclomine, hydroxyzine hcl, prednisone, and propranolol.    Allergies:   Review of patient's allergies indicates:  No Known Allergies   Imaging, none:     Prior Therapy: no PT has seen chiropractor   Social History: lives with their family  Occupation: not working   Prior Level of Function: independent with intermittent pain   Current Level of Function: independent with pain of neck and shoulders with OH activities, household ADL's, and fixing her hair with worsening symptoms     Pain:  Current 0/10, worst 9/10, best 3/10   Location: bilateral neck  and shoulder   Description: Throbbing  Aggravating Factors: insidious onset with no specific triggers; worse during female cycle   Easing Factors: laying down, keeping her head down, pain medication     Patients goals: reduce neck tension     Objective     Observation: unremarkable     Posture: rounded shoulders     Cervical Range of Motion:    Degrees Pain   Flexion 60 No pain      Extension 40 No pain      Right Rotation 65 No pain      Left Rotation 65 No pain      Right Side Bending 45 Tight/pain    Left Side Bending 45 Tight/pain      Shoulder Range of Motion:   Shoulder Left Right   Flexion WFL WFL   Abduction WFL WFL   ER WFL WFL   IR WFL WFL     Strength:  Cervical MMT   Flexion 5/5   Extension 4+/5   Right Side Bend 4+/5   Left Side Bend 4+/5     Upper Extremity Strength  (R) UE  (L) UE    Shoulder flexion: 5/5 Shoulder flexion: 5/5   Shoulder Abduction: 5/5 Shoulder abduction: 5/5   Shoulder ER 5/5 Shoulder ER 5/5   Shoulder IR 4+/5 Shoulder IR 4+/5   Lower Trap 4+/5 Lower Trap 4+/5   Middle Trap 4+/5 Middle Trap 4+/5   Rhomboids 4+/5 Rhomboids 4+/5         Special Tests:  Distraction Negative    Compression Negative    VA test Negative    Lateral Flexion Alar Ligament Negative        Joint Mobility: Cervical PA's normal,   Transverse glides slighly hypomobile ; (B) 1st rib mobilization hypomobile      Thoracic mobility: upper thoracic hypomobile with PA mobilizations    Palpation: no tenderness detected during palpation     Sensation: light touch intact    Flexibility: upper trapezius tightness detected bilaterally         Limitation/Restriction for FOTO Cervical/Shoulder Survey    Therapist reviewed FOTO scores for Priya Conrad on 7/21/2020.   FOTO documents entered into [employer] - see Media section.    Limitation Score: 31%         TREATMENT   Treatment Time In: 11:45 am   Treatment Time Out: 12:00 pm   Total Treatment time (time-based codes) separate from Evaluation: 15 minutes    PADILLA received therapeutic exercises to develop strength, endurance, ROM, flexibility and core stabilization for 10 minutes including: HEP Review and Development       Date  07/21/2020   VISIT 1/ POC EXP. DATE 09/21/2020   FACE-TO-FACE 08/21/2020   FOTO 1/5       Bike Retro --           SEATED:    UT stretch --   Levator scap stretch --   Cervical ROM  - sidebend L/R  - chin tucks  --   Posterior shoulder rolls  --   Scapula squeeze --           TABLE:    Supine Pec Stretch w/ 2 towel roll  --   Snow angels --   Butterflies  --   Supine T's  --   Prone:  --   - Y's   - T's   - I's  --       STANDING:    Pectoral stretch --   Theraband  - W's  - T's  - I's   - Rows   - ER --           Initials BJ         PADILLA received the following manual therapy techniques: Joint mobilizations, Manual traction and Soft tissue Mobilization were applied to the: cervical spine for 5 minutes, including: cervical and upper thoracic P/A mobilizations, 1st rib mobilizations, cervical PROM and transverse cervical glides     Home Exercises and Patient Education Provided    Education provided:   - HEP   - PT/pt. Roles and responsibilities     Written Home Exercises Provided: yes.  Exercises were reviewed and PADILLA was able to demonstrate them prior to the end of the session.  PADILLA demonstrated good  understanding of the education provided.     See EMR under  Patient Instructions for exercises provided 7/21/2020.    Assessment   Priya is a 34 y.o. female referred to outpatient Physical Therapy with a medical diagnosis of Myofascial pain. Patient presents with pain in her neck and shoulders that has been increase her migraines. Patient is suspected to beholding a lot of tension in her cervical and shoulder region that is increasing the severity of her migraines. Patient demonstrated no deficits in gross scapular strength along with no onset pain during manual muscle testing. During manual therapy tightness of bilateral upper trapezius muscles were noted along with decreased 1st rib mobility. Patient also demonstrated some hypomobility of the thoracic spine PA directions and with cervical transverse glides. Patient also demonstrated mild weakness in cervical musculature. Patient is 31% limited at this time.     Patient prognosis is Good.   Patientt will benefit from skilled outpatient Physical Therapy to address the deficits stated above and in the chart below, provide patient /family education, and to maximize patientt's level of independence.     Plan of care discussed with patient: Yes  Patient's spiritual, cultural and educational needs considered and patient is agreeable to the plan of care and goals as stated below:     Anticipated Barriers for therapy: none     Medical Necessity is demonstrated by the following  History  Co-morbidities and personal factors that may impact the plan of care Co-morbidities:   Angina, Anxiety or Panic Disorders, Back pain, BMI over 30, Headaches    Personal Factors:   no deficits     high   Examination  Body Structures and Functions, activity limitations and participation restrictions that may impact the plan of care Body Regions:   neck  upper extremities    Body Systems:    ROM  strength  posture, flexibiltiy    Participation Restrictions:   OH activities, household ADL's, and fixing her hair    Activity limitations:   Learning and  applying knowledge  no deficits    General Tasks and Commands  no deficits    Communication  no deficits    Mobility  lifting and carrying objects    Self care  dressing  hair care    Domestic Life  shopping  cooking  doing house work (cleaning house, washing dishes, laundry)    Interactions/Relationships  no deficits    Life Areas  no deficits    Community and Social Life  community life  recreation and leisure         moderate   Clinical Presentation evolving clinical presentation with changing clinical characteristics moderate   Decision Making/ Complexity Score: moderate     Goals:  Short Term Goals: 4 weeks   Patient will be independent and complaint with HEP.   Patient will increase gross scapular strength by 1 muscle grade in order to improve rounded shoulder posture.   Patient will be able to rate migraines less than 7/10 pain upon waking up in the morning.     Long Term Goals: 8 weeks   Improve 1st rib mobility wit normal in order to decrease stress and tension of (B) shoulder musculature.   Patient will be able to rate migraines less than 4/10 pain upon waking up in the morning.   Patient will increase gross scapular strength to 5/5 muscle grade in order to improve rounded shoulder posture.     Plan   Plan of care Certification: 7/21/2020 to 09/21/2020.    Outpatient Physical Therapy 2 times weekly for 8 weeks to include the following interventions: Cervical/Lumbar Traction, Manual Therapy, Moist Heat/ Ice, Neuromuscular Re-ed, Patient Education and Therapeutic Exercise. Cupping and kinesiotaping. Dry needling with manual therapy techniques to decrease pain, inflammation and swelling, increase circulation and promote healing process.       Edita Odonnell, PT, DPT

## 2020-07-21 NOTE — PATIENT INSTRUCTIONS
Flexibility: Upper Trapezius Stretch        Gently grasp right side of head while reaching behind back with other hand. Tilt head away until a gentle stretch is felt. Hold 5 seconds.  Repeat 5 times per set. Do 1 sets per session. Do 2 sessions per day.   .   Flexibility: Corner Stretch        Standing in corner with hands just above shoulder level and feet 8 inches from corner, lean forward until a comfortable stretch is felt across chest. Hold 10 seconds.  Repeat 5 times per set. Do 1 sets per session. Do 2 sessions per day.     Flexibility: Neck Retraction        Pull head straight back, keeping eyes and jaw level.  Repeat 10 times per set. Do 1 sets per session. Do 2 sessions per day.     Scapular Retraction (Standing)        With arms at sides, pinch shoulder blades together.  Repeat 10 times per set. Do 1 sets per session. Do 2 sessions per day.  Scapular Retraction: Bilateral        Facing anchor, pull arms back, bringing shoulder blades together.  Repeat 10 times per set. Do 3 sets per session. Do 2 sessions per day.

## 2020-07-23 ENCOUNTER — PATIENT MESSAGE (OUTPATIENT)
Dept: INTERNAL MEDICINE | Facility: CLINIC | Age: 35
End: 2020-07-23

## 2020-07-23 DIAGNOSIS — R30.0 DYSURIA: Primary | ICD-10-CM

## 2020-07-23 NOTE — TELEPHONE ENCOUNTER
I spoke to the patient and she just completed a steroid pack.  The patient is feeling jittery, h/a's, and lightheaded.  If symptoms worsens then she can go to urgent care.  Regarding the urine frequency the patient will gave a urine specimen today.

## 2020-08-07 ENCOUNTER — OFFICE VISIT (OUTPATIENT)
Dept: OBSTETRICS AND GYNECOLOGY | Facility: CLINIC | Age: 35
End: 2020-08-07
Payer: COMMERCIAL

## 2020-08-07 ENCOUNTER — LAB VISIT (OUTPATIENT)
Dept: LAB | Facility: HOSPITAL | Age: 35
End: 2020-08-07
Attending: OBSTETRICS & GYNECOLOGY
Payer: COMMERCIAL

## 2020-08-07 VITALS
DIASTOLIC BLOOD PRESSURE: 68 MMHG | BODY MASS INDEX: 31.11 KG/M2 | WEIGHT: 186.94 LBS | SYSTOLIC BLOOD PRESSURE: 110 MMHG

## 2020-08-07 DIAGNOSIS — N93.9 ABNORMAL UTERINE BLEEDING (AUB): ICD-10-CM

## 2020-08-07 DIAGNOSIS — Z01.419 ENCOUNTER FOR ANNUAL ROUTINE GYNECOLOGICAL EXAMINATION: ICD-10-CM

## 2020-08-07 DIAGNOSIS — Z11.3 SCREENING EXAMINATION FOR STD (SEXUALLY TRANSMITTED DISEASE): ICD-10-CM

## 2020-08-07 DIAGNOSIS — Z01.419 ENCOUNTER FOR ANNUAL ROUTINE GYNECOLOGICAL EXAMINATION: Primary | ICD-10-CM

## 2020-08-07 DIAGNOSIS — D50.9 IRON DEFICIENCY ANEMIA, UNSPECIFIED IRON DEFICIENCY ANEMIA TYPE: ICD-10-CM

## 2020-08-07 DIAGNOSIS — Z12.4 PAP SMEAR FOR CERVICAL CANCER SCREENING: ICD-10-CM

## 2020-08-07 LAB
BASOPHILS # BLD AUTO: 0.02 K/UL (ref 0–0.2)
BASOPHILS NFR BLD: 0.4 % (ref 0–1.9)
DIFFERENTIAL METHOD: NORMAL
EOSINOPHIL # BLD AUTO: 0.1 K/UL (ref 0–0.5)
EOSINOPHIL NFR BLD: 2.2 % (ref 0–8)
ERYTHROCYTE [DISTWIDTH] IN BLOOD BY AUTOMATED COUNT: 14.5 % (ref 11.5–14.5)
HCT VFR BLD AUTO: 39.3 % (ref 37–48.5)
HGB BLD-MCNC: 12.7 G/DL (ref 12–16)
IMM GRANULOCYTES # BLD AUTO: 0.01 K/UL (ref 0–0.04)
IMM GRANULOCYTES NFR BLD AUTO: 0.2 % (ref 0–0.5)
IRON SERPL-MCNC: 92 UG/DL (ref 30–160)
LYMPHOCYTES # BLD AUTO: 2 K/UL (ref 1–4.8)
LYMPHOCYTES NFR BLD: 40.3 % (ref 18–48)
MCH RBC QN AUTO: 29.7 PG (ref 27–31)
MCHC RBC AUTO-ENTMCNC: 32.3 G/DL (ref 32–36)
MCV RBC AUTO: 92 FL (ref 82–98)
MONOCYTES # BLD AUTO: 0.5 K/UL (ref 0.3–1)
MONOCYTES NFR BLD: 10.3 % (ref 4–15)
NEUTROPHILS # BLD AUTO: 2.3 K/UL (ref 1.8–7.7)
NEUTROPHILS NFR BLD: 46.6 % (ref 38–73)
NRBC BLD-RTO: 0 /100 WBC
PLATELET # BLD AUTO: 286 K/UL (ref 150–350)
PMV BLD AUTO: 10.6 FL (ref 9.2–12.9)
RBC # BLD AUTO: 4.28 M/UL (ref 4–5.4)
SATURATED IRON: 22 % (ref 20–50)
TOTAL IRON BINDING CAPACITY: 419 UG/DL (ref 250–450)
TRANSFERRIN SERPL-MCNC: 283 MG/DL (ref 200–375)
TSH SERPL DL<=0.005 MIU/L-ACNC: 0.79 UIU/ML (ref 0.4–4)
WBC # BLD AUTO: 4.96 K/UL (ref 3.9–12.7)

## 2020-08-07 PROCEDURE — 99385 PREV VISIT NEW AGE 18-39: CPT | Mod: S$GLB,,, | Performed by: OBSTETRICS & GYNECOLOGY

## 2020-08-07 PROCEDURE — 87491 CHLMYD TRACH DNA AMP PROBE: CPT

## 2020-08-07 PROCEDURE — 83540 ASSAY OF IRON: CPT

## 2020-08-07 PROCEDURE — 99385 PR PREVENTIVE VISIT,NEW,18-39: ICD-10-PCS | Mod: S$GLB,,, | Performed by: OBSTETRICS & GYNECOLOGY

## 2020-08-07 PROCEDURE — 99999 PR PBB SHADOW E&M-EST. PATIENT-LVL III: CPT | Mod: PBBFAC,,, | Performed by: OBSTETRICS & GYNECOLOGY

## 2020-08-07 PROCEDURE — 99999 PR PBB SHADOW E&M-EST. PATIENT-LVL III: ICD-10-PCS | Mod: PBBFAC,,, | Performed by: OBSTETRICS & GYNECOLOGY

## 2020-08-07 PROCEDURE — 88142 CYTOPATH C/V THIN LAYER: CPT

## 2020-08-07 PROCEDURE — 36415 COLL VENOUS BLD VENIPUNCTURE: CPT

## 2020-08-07 PROCEDURE — 85025 COMPLETE CBC W/AUTO DIFF WBC: CPT

## 2020-08-07 PROCEDURE — 87624 HPV HI-RISK TYP POOLED RSLT: CPT

## 2020-08-07 PROCEDURE — 84443 ASSAY THYROID STIM HORMONE: CPT

## 2020-08-07 NOTE — PROGRESS NOTES
Chief Complaint   Patient presents with    Well Woman       HISTORY OF PRESENT ILLNESS:   Priya Conrad is a 35 y.o. female  With HTN, migraines and h/o  and abdominoplasty who presents for well woman exam.  Patient's last menstrual period was 2020..  She has complains of very heavy cycles, reports regular in timing and last 7 days but has to use depends on first 3 days or will soak through everything. Has been heavy for last 10 years since BTL but worsening over past 5 years. Tried ocps with no help. She also reports more pre-menstrual symptoms with worsening cramping and migraines about 2 weeks before.   Cycles are regular. Desires STD testing.      Past Medical History:   Diagnosis Date    Anxiety     Heart murmur     Migraine           Past Surgical History:   Procedure Laterality Date     SECTION      TUBAL LIGATION      Tummy Tuck           Social History     Socioeconomic History    Marital status:      Spouse name: Not on file    Number of children: 3    Years of education: Not on file    Highest education level: Not on file   Occupational History    Occupation: medical assistant     Comment: [employer]   Social Needs    Financial resource strain: Not on file    Food insecurity     Worry: Not on file     Inability: Not on file    Transportation needs     Medical: Not on file     Non-medical: Not on file   Tobacco Use    Smoking status: Never Smoker    Smokeless tobacco: Never Used   Substance and Sexual Activity    Alcohol use: Yes     Comment: socially    Drug use: No    Sexual activity: Not on file   Lifestyle    Physical activity     Days per week: Not on file     Minutes per session: Not on file    Stress: Not on file   Relationships    Social connections     Talks on phone: Not on file     Gets together: Not on file     Attends [employer] service: Not on file     Active member of club or organization: Not on file     Attends meetings of  clubs or organizations: Not on file     Relationship status: Not on file   Other Topics Concern    Not on file   Social History Narrative    , 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 Denies PID.  Benign History:Denies uterine fibroids. Denies ovarian cysts. Denies endometriosis Denies other conditions.  Cancer History: Denies cervical cancer. Denies uterine cancer or hyperplasia. Denies ovarian cancer. Denies vulvar cancer or pre-cancer. Denies vaginal cancer or pre-cancer. Denies breast cancer. Denies colon cancer.  Cycle: 14/mon/7d, heavy and painful   Had BTL     ROS:  GENERAL: Denies weight gain or weight loss. Feeling well overall.   SKIN: Denies rash or lesions.   HEAD: Denies headache.   NODES: Denies enlarged lymph nodes.   CHEST: Denies shortness of breath.   ABDOMEN: No abdominal pain, constipation, diarrhea, nausea, vomiting or rectal bleeding.   URINARY: No frequency, dysuria, hematuria, or burning on urination.  REPRODUCTIVE: See HPI.   BREASTS: The patient denies pain, lumps, or nipple discharge.       /68   Wt 84.8 kg (186 lb 15.2 oz)   LMP 2020   BMI 31.11 kg/m²     APPEARANCE: Well nourished, well developed, in no acute distress.  NECK: Neck symmetric without  thyromegaly.  NODES: No inguinal, cervical lymph node enlargement.  CHEST: Lungs clear to auscultation.  HEART: Regular rate and rhythm, no murmurs, rubs or gallops.  ABDOMEN: Soft. No  tenderness or masses. No hernias. No hepatosplenomegaly. scar c/w well healed abdominoplasty  BREASTS: Symmetrical, no skin changes or visible lesions. No palpable masses, nipple discharge or adenopathy bilaterally.  PELVIC:   VULVA: No lesions. Normal female genitalia.  URETHRAL MEATUS: Normal size and location, no lesions, no prolapse.  URETHRA: No masses, tenderness, prolapse or scarring.  VAGINA: Moist and well rugated, no discharge, no significant cystocele or rectocele.  CERVIX: No lesions and discharge.  UTERUS: 8-10 week size, regular shape, mobile, non-tender, bladder base nontender.  ADNEXA: No masses or tenderness.      1. Encounter for annual routine gynecological examination    2. Pap smear for cervical cancer screening    3. Abnormal uterine bleeding (AUB)    4. Iron deficiency anemia, unspecified iron deficiency anemia type    5. Screening examination for STD (sexually transmitted disease)        Plan:  Routine gyn s/p normal breast exam. Pap With HPV cotesting ordered . STD testing: GC/CT ordered,   2.  AUB  will need full work up to evaluate causes.  Labs ordered: iron studies, CBC, and TSH  Imaging ordered:  Pelvic ultrasound  Treatment medical vs surgical discussed. Encouraged to consider IUD. Don't think lysteda will help with pre-menstrual issues sand discussed even if did hyst would recommend keeping ovaries and likely would still have issues with pre-menstrual symptoms.     F/u in 2 weeks to review labs and start treatment.

## 2020-08-14 LAB
HPV HR 12 DNA SPEC QL NAA+PROBE: NEGATIVE
HPV16 AG SPEC QL: NEGATIVE
HPV18 DNA SPEC QL NAA+PROBE: NEGATIVE

## 2020-08-17 LAB
C TRACH DNA SPEC QL NAA+PROBE: NOT DETECTED
N GONORRHOEA DNA SPEC QL NAA+PROBE: NOT DETECTED

## 2020-08-24 LAB
FINAL PATHOLOGIC DIAGNOSIS: NORMAL
Lab: NORMAL

## 2020-08-25 ENCOUNTER — PATIENT MESSAGE (OUTPATIENT)
Dept: OBSTETRICS AND GYNECOLOGY | Facility: CLINIC | Age: 35
End: 2020-08-25

## 2020-08-27 DIAGNOSIS — G47.9 SLEEP DISTURBANCE: Primary | ICD-10-CM

## 2020-08-27 RX ORDER — HYDROXYZINE HYDROCHLORIDE 25 MG/1
25 TABLET, FILM COATED ORAL NIGHTLY PRN
Qty: 30 TABLET | Refills: 5 | Status: SHIPPED | OUTPATIENT
Start: 2020-08-27 | End: 2021-02-23

## 2020-09-03 ENCOUNTER — TELEPHONE (OUTPATIENT)
Dept: OBSTETRICS AND GYNECOLOGY | Facility: CLINIC | Age: 35
End: 2020-09-03

## 2020-09-03 NOTE — TELEPHONE ENCOUNTER
Left message for patient to return call. Needing to reschedule her appointment on 9/8/20 to either morning in [place] or another day in [place].

## 2020-09-18 ENCOUNTER — TELEPHONE (OUTPATIENT)
Dept: OBSTETRICS AND GYNECOLOGY | Facility: CLINIC | Age: 35
End: 2020-09-18

## 2020-09-30 ENCOUNTER — PATIENT MESSAGE (OUTPATIENT)
Dept: NEUROLOGY | Facility: CLINIC | Age: 35
End: 2020-09-30

## 2020-10-16 ENCOUNTER — OFFICE VISIT (OUTPATIENT)
Dept: NEUROLOGY | Facility: CLINIC | Age: 35
End: 2020-10-16
Payer: COMMERCIAL

## 2020-10-16 PROCEDURE — 99214 OFFICE O/P EST MOD 30 MIN: CPT | Mod: 95,,, | Performed by: PHYSICIAN ASSISTANT

## 2020-10-16 PROCEDURE — 99214 PR OFFICE/OUTPT VISIT, EST, LEVL IV, 30-39 MIN: ICD-10-PCS | Mod: 95,,, | Performed by: PHYSICIAN ASSISTANT

## 2020-10-16 RX ORDER — PROPRANOLOL HYDROCHLORIDE 20 MG/1
20 TABLET ORAL 2 TIMES DAILY
Qty: 60 TABLET | Refills: 5 | Status: SHIPPED | OUTPATIENT
Start: 2020-10-16 | End: 2021-08-17

## 2020-10-16 NOTE — PROGRESS NOTES
Established Patient     The patient location is: home in [place]  The chief complaint leading to consultation is: headache follow up visit    Visit type: audiovisual    Face to Face time with patient: 15 min  20 minutes of total time spent on the encounter, which includes face to face time and non-face to face time preparing to see the patient (eg, review of tests), Obtaining and/or reviewing separately obtained history, Documenting clinical information in the electronic or other health record, Independently interpreting results (not separately reported) and communicating results to the patient/family/caregiver, or Care coordination (not separately reported).     Each patient to whom he or she provides medical services by telemedicine is:  (1) informed of the relationship between the physician and patient and the respective role of any other health care provider with respect to management of the patient; and (2) notified that he or she may decline to receive medical services by telemedicine and may withdraw from such care at any time.    Notes:        SUBJECTIVE:  Patient ID: Priya Conrad   Chief Complaint: Headache    History of Present Illness:  Priya Conrad is a 35 y.o. female with a PMHx of migraines, anxiety, HTN, constipation, hemorrhoids who presents via virtual visit alone for follow-up of headaches.       Recommendations made at last Office Visit on 7/14/20:  - Discussed symptoms appear to be consistent with migraines and med overuse HA, discussed treatment options and patient agreed with the following plan:  - ppx - increase propranolol to 20mg BID  - abortive - d/c fioricet and tylenol, trial diclofenac (consider naproxen in future)  - med overuse HA - d/c fioricet and tylenol  - prednisone taper to bridge while d/c fioricet and to assist in breaking HA cycle  - muscle tension - referral to PT and trial of baclofen  - sleep hygiene discussed  - caution w/ cgrp inhibitors - hx of constipation   - anxiety  "- continue atarax and xanax, mgmt per pcp  - HTN - stable, continue amlodipine and propranolol, mgmt per pcp  - risks, benefits, and potential side effects of propranolol, fioricet, tylenol, diclofenac, steroid, baclofen discussed   - alternative treatment options offered   - importance of healthy diet, regular exercise and sleep hygiene in the treatment of headaches    - Start tracking headaches via Migraine Buddy seymour on phone   - RTC in 6 wks     10/16/2020 - Interval History:  Pt reports her HA's have improved greatly and are occurring 2-7/30, severity 3-5/10, duration 1 hour. She has d/c fioricet and tylenol. She also completed the steroid taper which caused nausea, dizziness, weakness, and increased BP's, pt states she felt it was "too strong" for her. She increased propranolol as discussed to 20mg BID with no SE's noted. Finds diclofenac resolves her HA's. She tried PT for 2 sessions and baclofen but does not find this helps her muscle tension.   Plan: continue propranolol 20mg BID, continue diclofenac, continue to d/c fioricet and tylenol, may d/c PT and baclofen as it was not benificial, f/u 6 mo    Treatments Tried:  Propranolol - helps  Baclofen - didn't help  Flexeril  Ibuprofen (helps menstrual cycle HA's only)  hydroxyzine  fioricet - 0-4 per day  Tylenol - 0-2 per day  Diclofenac - helps  Prednisone taper - nausea, dizziness, weakness    Current Medications:    Current Outpatient Medications:     ALPRAZolam (XANAX) 0.25 MG tablet, Take 1 tablet (0.25 mg total) by mouth daily as needed for Anxiety., Disp: 30 tablet, Rfl: 2    amLODIPine (NORVASC) 5 MG tablet, Take 5 mg by mouth once daily., Disp: , Rfl:     diclofenac (VOLTAREN) 75 MG EC tablet, Take 1 tablet (75 mg total) by mouth 2 (two) times daily as needed., Disp: 20 tablet, Rfl: 3    dicyclomine (BENTYL) 20 mg tablet, TAKE 1 TABLET BY MOUTH THREE TIMES DAILY AS NEEDED FOR ABDOMINAL CRAMPS, Disp: , Rfl:     hydrOXYzine HCL (ATARAX) 25 MG " tablet, Take 1 tablet (25 mg total) by mouth nightly as needed (sleep disturbance)., Disp: 30 tablet, Rfl: 5    propranoloL (INDERAL) 20 MG tablet, Take 1 tablet (20 mg total) by mouth 2 (two) times daily., Disp: 60 tablet, Rfl: 5    Review of Systems - as per HPI, otherwise a balanced 10 systems review is negative.    OBJECTIVE:  Vitals:  There were no vitals taken for this visit.     Physical Exam:  Constitutional: she appears well-developed and well-nourished. she is well groomed. NAD   HENT:    Head: Normocephalic and atraumatic  Eyes: Conjunctivae and EOM are normal  Musculoskeletal: Normal range of motion. No joint stiffness.   Psychiatric: Mood and affect are normal    Neuro: Patient is alert and oriented to person, place, and time. Language is intact and fluent. Speech is clear and fluent. Recent and remote memory are intact.  Normal attention and concentration.  Facial movement is symmetric. Moves all 4 extremities against gravity.      Review of Data:   Notes from neuro reviewed   Labs:  Lab Visit on 08/07/2020   Component Date Value Ref Range Status    WBC 08/07/2020 4.96  3.90 - 12.70 K/uL Final    RBC 08/07/2020 4.28  4.00 - 5.40 M/uL Final    Hemoglobin 08/07/2020 12.7  12.0 - 16.0 g/dL Final    Hematocrit 08/07/2020 39.3  37.0 - 48.5 % Final    Mean Corpuscular Volume 08/07/2020 92  82 - 98 fL Final    Mean Corpuscular Hemoglobin 08/07/2020 29.7  27.0 - 31.0 pg Final    Mean Corpuscular Hemoglobin Conc 08/07/2020 32.3  32.0 - 36.0 g/dL Final    RDW 08/07/2020 14.5  11.5 - 14.5 % Final    Platelets 08/07/2020 286  150 - 350 K/uL Final    MPV 08/07/2020 10.6  9.2 - 12.9 fL Final    Immature Granulocytes 08/07/2020 0.2  0.0 - 0.5 % Final    Gran # (ANC) 08/07/2020 2.3  1.8 - 7.7 K/uL Final    Immature Grans (Abs) 08/07/2020 0.01  0.00 - 0.04 K/uL Final    Lymph # 08/07/2020 2.0  1.0 - 4.8 K/uL Final    Mono # 08/07/2020 0.5  0.3 - 1.0 K/uL Final    Eos # 08/07/2020 0.1  0.0 - 0.5 K/uL  Final    Baso # 08/07/2020 0.02  0.00 - 0.20 K/uL Final    nRBC 08/07/2020 0  0 /100 WBC Final    Gran% 08/07/2020 46.6  38.0 - 73.0 % Final    Lymph% 08/07/2020 40.3  18.0 - 48.0 % Final    Mono% 08/07/2020 10.3  4.0 - 15.0 % Final    Eosinophil% 08/07/2020 2.2  0.0 - 8.0 % Final    Basophil% 08/07/2020 0.4  0.0 - 1.9 % Final    Differential Method 08/07/2020 Automated   Final    TSH 08/07/2020 0.794  0.400 - 4.000 uIU/mL Final    Iron 08/07/2020 92  30 - 160 ug/dL Final    Transferrin 08/07/2020 283  200 - 375 mg/dL Final    TIBC 08/07/2020 419  250 - 450 ug/dL Final    Saturated Iron 08/07/2020 22  20 - 50 % Final   Office Visit on 08/07/2020   Component Date Value Ref Range Status    Final Pathologic Diagnosis 08/07/2020    Final                    Value:Specimen Adequacy  Satisfactory for interpretation. Endocervical component is absent.  Wray Category  Negative for intraepithelial lesion or malignancy.  Shift in real suggestive of bacterial vaginosis.      Disclaimer 08/07/2020    Final                    Value:The Pap smear is a screening test that aids in the detection of cervical  cancer and cancer precursors. Both false positive and false negative results  can occur. The test should be used at regular intervals, and positive results  should be confirmed before definitive therapy.      HPV other High Risk types, PCR 08/07/2020 Negative  Negative Final    HPV High Risk type 16, PCR 08/07/2020 Negative  Negative Final    HPV High Risk type 18, PCR 08/07/2020 Negative  Negative Final    Chlamydia, Amplified DNA 08/07/2020 Not Detected  Not Detected Final    N gonorrhoeae, amplified DNA 08/07/2020 Not Detected  Not Detected Final     Imaging:  No results found for this or any previous visit.  Note: I have independently reviewed any/all imaging/labs/tests and agree with the report (s) as documented.  Any discrepancies will be as noted/demarcated by free text.  LEVY AMADOR  10/16/2020    ASSESSMENT:  1. Chronic migraine        PLAN:  - Discussed symptoms appear to be consistent with migraines and med overuse HA, discussed treatment options and patient agreed with the following plan:  - ppx - continue propranolol to 20mg BID  - abortive - d/c fioricet and tylenol, continue diclofenac   - med overuse HA - continue to d/c fioricet and tylenol  - muscle tension - conservative measures  - sleep hygiene discussed  - caution w/ cgrp inhibitors - hx of constipation   - anxiety - continue atarax and xanax, mgmt per pcp  - HTN - stable, continue amlodipine and propranolol, mgmt per pcp  - Continue tracking headaches   - Discussed goals of therapy are to decrease the frequency, intensity, and duration of headaches  - RTC in 6 mo     Orders Placed This Encounter    propranoloL (INDERAL) 20 MG tablet       Questions and concerns were sought and answered to the patient's stated verbal satisfaction.  The patient verbalizes understanding and agreement with the above stated treatment plan.     CC: MD Clara Rodriguez PA-C  [facility]   [phone]    Dr. Connors was available during today's encounter.

## 2020-12-27 ENCOUNTER — HOSPITAL ENCOUNTER (EMERGENCY)
Facility: HOSPITAL | Age: 35
Discharge: HOME | End: 2020-12-27
Attending: EMERGENCY MEDICINE
Payer: COMMERCIAL

## 2020-12-27 VITALS
OXYGEN SATURATION: 99 % | SYSTOLIC BLOOD PRESSURE: 117 MMHG | HEART RATE: 70 BPM | RESPIRATION RATE: 20 BRPM | WEIGHT: 195 LBS | HEIGHT: 65 IN | BODY MASS INDEX: 32.49 KG/M2 | DIASTOLIC BLOOD PRESSURE: 83 MMHG | TEMPERATURE: 99 F

## 2020-12-27 DIAGNOSIS — R07.9 CHEST PAIN: ICD-10-CM

## 2020-12-27 LAB
ALBUMIN SERPL BCP-MCNC: 3.9 G/DL (ref 3.5–5.2)
ALP SERPL-CCNC: 80 U/L (ref 38–126)
ALT SERPL W/O P-5'-P-CCNC: 59 U/L (ref 10–44)
ANION GAP SERPL CALC-SCNC: 6 MMOL/L (ref 8–16)
AST SERPL-CCNC: 48 U/L (ref 15–46)
BASOPHILS # BLD AUTO: 0.04 K/UL (ref 0–0.2)
BASOPHILS NFR BLD: 0.7 % (ref 0–1.9)
BILIRUB SERPL-MCNC: 0.2 MG/DL (ref 0.1–1)
CALCIUM SERPL-MCNC: 9 MG/DL (ref 8.7–10.5)
CHLORIDE SERPL-SCNC: 105 MMOL/L (ref 95–110)
CO2 SERPL-SCNC: 29 MMOL/L (ref 23–29)
CREAT SERPL-MCNC: 0.85 MG/DL (ref 0.5–1.4)
DIFFERENTIAL METHOD: NORMAL
EOSINOPHIL # BLD AUTO: 0.1 K/UL (ref 0–0.5)
EOSINOPHIL NFR BLD: 2.1 % (ref 0–8)
ERYTHROCYTE [DISTWIDTH] IN BLOOD BY AUTOMATED COUNT: 13.7 % (ref 11.5–14.5)
EST. GFR  (AFRICAN AMERICAN): >60 ML/MIN/1.73 M^2
EST. GFR  (NON AFRICAN AMERICAN): >60 ML/MIN/1.73 M^2
GLUCOSE SERPL-MCNC: 117 MG/DL (ref 70–110)
HCT VFR BLD AUTO: 37.5 % (ref 37–48.5)
HGB BLD-MCNC: 12.1 G/DL (ref 12–16)
IMM GRANULOCYTES # BLD AUTO: 0.01 K/UL (ref 0–0.04)
IMM GRANULOCYTES NFR BLD AUTO: 0.2 % (ref 0–0.5)
LIPASE SERPL-CCNC: 125 U/L (ref 23–300)
LYMPHOCYTES # BLD AUTO: 2.2 K/UL (ref 1–4.8)
LYMPHOCYTES NFR BLD: 35.5 % (ref 18–48)
MCH RBC QN AUTO: 29.4 PG (ref 27–31)
MCHC RBC AUTO-ENTMCNC: 32.3 G/DL (ref 32–36)
MCV RBC AUTO: 91 FL (ref 82–98)
MONOCYTES # BLD AUTO: 0.6 K/UL (ref 0.3–1)
MONOCYTES NFR BLD: 9.7 % (ref 4–15)
NEUTROPHILS # BLD AUTO: 3.2 K/UL (ref 1.8–7.7)
NEUTROPHILS NFR BLD: 51.8 % (ref 38–73)
NRBC BLD-RTO: 0 /100 WBC
PLATELET # BLD AUTO: 285 K/UL (ref 150–350)
PMV BLD AUTO: 10 FL (ref 9.2–12.9)
POTASSIUM SERPL-SCNC: 3.6 MMOL/L (ref 3.5–5.1)
PROT SERPL-MCNC: 7.3 G/DL (ref 6–8.4)
RBC # BLD AUTO: 4.11 M/UL (ref 4–5.4)
SODIUM SERPL-SCNC: 140 MMOL/L (ref 136–145)
TROPONIN I SERPL-MCNC: <0.012 NG/ML (ref 0.01–0.03)
UUN UR-MCNC: 13 MG/DL (ref 7–17)
WBC # BLD AUTO: 6.08 K/UL (ref 3.9–12.7)

## 2020-12-27 PROCEDURE — 83690 ASSAY OF LIPASE: CPT | Mod: ER

## 2020-12-27 PROCEDURE — 84484 ASSAY OF TROPONIN QUANT: CPT | Mod: ER

## 2020-12-27 PROCEDURE — 25000003 PHARM REV CODE 250: Mod: ER | Performed by: EMERGENCY MEDICINE

## 2020-12-27 PROCEDURE — 80053 COMPREHEN METABOLIC PANEL: CPT | Mod: ER

## 2020-12-27 PROCEDURE — 99283 EMERGENCY DEPT VISIT LOW MDM: CPT | Mod: 25,ER

## 2020-12-27 PROCEDURE — 93005 ELECTROCARDIOGRAM TRACING: CPT | Mod: ER

## 2020-12-27 PROCEDURE — 93010 EKG 12-LEAD: ICD-10-PCS | Mod: ,,, | Performed by: INTERNAL MEDICINE

## 2020-12-27 PROCEDURE — 93010 ELECTROCARDIOGRAM REPORT: CPT | Mod: ,,, | Performed by: INTERNAL MEDICINE

## 2020-12-27 PROCEDURE — 85025 COMPLETE CBC W/AUTO DIFF WBC: CPT | Mod: ER

## 2020-12-27 RX ORDER — PLECANATIDE 3 MG/1
3 TABLET ORAL
COMMUNITY
End: 2022-11-28

## 2020-12-27 RX ORDER — PANTOPRAZOLE SODIUM 20 MG/1
20 TABLET, DELAYED RELEASE ORAL DAILY
Qty: 30 TABLET | Refills: 0 | Status: SHIPPED | OUTPATIENT
Start: 2020-12-27 | End: 2022-05-10

## 2020-12-27 RX ADMIN — LIDOCAINE HYDROCHLORIDE: 20 SOLUTION ORAL; TOPICAL at 09:12

## 2020-12-28 ENCOUNTER — PATIENT MESSAGE (OUTPATIENT)
Dept: NEUROLOGY | Facility: CLINIC | Age: 35
End: 2020-12-28

## 2020-12-28 NOTE — ED NOTES
LOC: The patient is awake, alert and aware of environment with an appropriate affect, the patient is oriented x 3 and speaking appropriately.     Psych: Patient is calm and cooperative with good eye contact.    APPEARANCE: Patient is clean and non toxic appearing    NEUROLOGIC:  LEX, Follows commands without difficulty. Speech is clear. No neuro deficits observed.    HEENT: Denies HEENT complaint or injury, moist mucus membranes     RESPIRATORY: Airway is open and patent, respirations are spontaneous; patient has a normal effort and rate. Bilateral breath sounds are clear. Pink nailbeds.     CARDIAC: Patient has a normal rate and rhythm, no peripheral edema noted, capillary refill < 2 seconds. PULSES are symmetrical in all extremities. Burning type sensation in chest     GI/ : Soft and non tender to palpation, no distention noted. No nausea at present     MUSCULOSKELETAL:  Normal range of motion noted. Moves all extremities well, No swelling, deformity or tenderness noted. Tightness in neck and shoulders     SKIN: The skin is warm, dry and intact. Patient has normal skin turgor and moist mucus membranes, no rashes or lesions. No Breakdown noted.

## 2020-12-28 NOTE — ED PROVIDER NOTES
"History       Chief complaint:  Chest pain    HPI:    Priya Conrad 35 y.o. presents to the emergency department today with a complaint of chest pain. The pain has been present since for the past 4 days.  She describes it as a pressure/burning sensation in her chest.  She denies any shortness of breath.  She denies any palpitations.  She denies any recent cough, fever, sputum production, hemoptysis.  No leg swelling.  She is worried that she may be having a heart attack due to what she has read online.  She denies any injury or trauma.  No known Marfans, connective tissue disease, family history aortic disease, known aortic valve disease, recent aortic manipulation, known thoracic aortic aneurysm.           ROS    Constitutional: No fever, no chills.  Eyes: No discharge. No pain.  HENT: No nasal drainage. No ear ache. No sore throat.  Cardiovascular: See above.  Respiratory: See above.  Gastrointestinal: No abdominal pain, no vomiting. No diarrhea.  Genitourinary: No hematuria, dysuria, urgency.  Musculoskeletal: No back pain.   Skin: No rashes, no lesions.  Neurological:  Chronic migraines    Otherwise remaining ROS negative     The history is provided by the patient      ALLERGIES REVIEWED  MEDICATIONS REVIEWED  PMH/PSH/SOC/FH REVIEWED       Past Medical History:   Diagnosis Date    Anxiety     Heart murmur     Hypertension     Migraine          Past Surgical History:   Procedure Laterality Date     SECTION      TUBAL LIGATION      Tummy Tuck           Social History     Tobacco Use    Smoking status: Never Smoker    Smokeless tobacco: Never Used   Substance Use Topics    Alcohol use: Yes     Comment: socially    Drug use: No         Nursing/Ancillary staff note reviewed.  VS reviewed         Physical Exam     /83 (Patient Position: Lying)   Pulse 70   Temp 98.5 °F (36.9 °C) (Oral)   Resp 20   Ht 5' 5" (1.651 m)   Wt 88.5 kg (195 lb)   LMP 2020 (Exact Date)   SpO2 99%   " Breastfeeding No   BMI 32.45 kg/m²     Physical Exam    General Appearance: The patient is alert, has no immediate need for airway protection and no signs of toxicity. No acute distress. Lying in bed but able to sit up without difficulty.   HEENT: Eyes: Pupils equal and round no pallor or injection. Extra ocular movements intact. No drainage.       Mouth: Mucous membranes are moist. Oropharynx clear.   Neck:Neck is supple non-tender. No lymphadenopathy. No stridor.   Respiratory: There are no retractions, lungs are clear to auscultation. No wheezing, no crackles. Chest wall nontender to palpation.   Cardiovascular: Regular rate and rhythm. No murmurs, rubs or gallops.  Gastrointestinal:  Abdomen is soft and non-tender, no masses, bowel sounds normal. No guarding, no rebound.  No pulsatile mass.   Neurological: Alert and oriented x 4. CN II-XII grossly intact. No focal weakness. Strength intact 5/5 bilaterally in upper and lower extremities.   Skin: Warm and dry, no rashes.  Musculoskeletal:Musculoskeletal: Extremities are non-tender, non-swollen and have full range of motion. Back NTTP along the midline.     DIFFERENTIAL DIAGNOSIS: After history and physical exam a differential diagnosis was considered, but was not limited to, Myocardial ischemia, pericarditis, pulmonary embolus, chest wall pain, pleural inflammation and pulmonary infectious causes.        Past chart history reviewed:  Patient is in seen regularly by neurology for her migraines.  She has been seen regularly by her OBGYN for her yearly exams last emergency department visit was in August 2017 for headache.      Initial management: Priya Conrad presents to the ED today with chest pain, nursing note reviewed, vital signs reviewed, will obtain labs including troponin, EKG, CXR to further evaluate the patient's chest pain treat and reassess.     Heart score    History: 0     Highly suspicious  2   Moderately suspicious  1   Slightly suspicious   0    EK     Significant ST depression  2   Non specific repolarization disturbance  1   Normal  0    Age: 0     >/= 65  2   45-65  1   </= 45  0    Risk Factors: 1   (HLD, HTN, DM, smoker, FmHx, obesity)   >/= 3  2   1-2  1   None  0    Troponin: 0     >/= 3 x normal  2   1-3 x normal  1   </= normal  0      Heart score of [0-3] indicating low risk of major adverse cardiac event          Labs reviewed  Labs Reviewed   COMPREHENSIVE METABOLIC PANEL - Abnormal; Notable for the following components:       Result Value    Glucose 117 (*)     AST 48 (*)     ALT 59 (*)     Anion Gap 6 (*)     All other components within normal limits   CBC W/ AUTO DIFFERENTIAL   LIPASE   TROPONIN I         Imaging reviewed by myself, read by Radiology  X-Ray Chest 1 View   ED Interpretation   No consolidation, no pneumothorax, no widened mediastinum, read by myself, read by radiology pending.            EK   Beats per minute, normal axis, normal intervals, no ST elevations, read by myself read by Cardiology pending            ED Course     Patient improved with treatment in the emergency department and comfortable going home. Discussed reasons to return and importance of followup.  Patient understands that the emergency visit today is primarily to address immediate concerns and to rule out emergent cause of symptoms and that they may require further workup and evaluation as an outpatient. All questions addressed and patient given discharge instructions and followup information.          FAITH    Priya Conrad  35 y.o. presents to the ED today with chest pain. EKG without signs of acute ACS, troponin normal, low HEART Score, delta troponin normal. No need for emergent cardiology evaluation today. No risk factors for PE. I doubt pulmonary embolism. CXR without signs of penumothorax, infectious etiology, pneumomediastinum. No PMH or FH for concerns for aortic dissection, no evidence of perfusion deficit, no widened  mediastinum, unlikely to be aortic dissection. There is no need for admission at this time. They are appropriate for discharge home with followup.  The pt is comfortable with this plan and comfortable going home at this time. After taking into careful account the historical factors and physical exam findings of the patient's presentation today, in conjunction with the empirical and objective data obtained on ED workup, no acute emergent medical condition requiring admission has been identified. The patient appears to be low risk for an emergent medical condition and I feel it is safe and appropriate at this time for the patient to be discharged to follow-up as detailed in their discharge instructions for reevaluation and possible continued outpatient workup and management. Regardless, an unremarkable evaluation in the ED does not preclude the development or presence of a serious or life threatening condition. As such, patient was instructed to return immediately for any worsening or change in current symptoms. Precautions for return discussed at length.  Discharge and follow-up instructions discussed with the patient who expressed understanding and willingness to comply with my recommendations.        Voice recognition software utilized in this note.              Impression        Diagnoses of Chest pain and Chest pain were pertinent to this visit.                Discharge Medication List as of 12/27/2020 11:26 PM      START taking these medications    Details   pantoprazole (PROTONIX) 20 MG tablet Take 1 tablet (20 mg total) by mouth once daily., Starting Sun 12/27/2020, Until Mon 12/27/2021, Normal               Follow-up Information     Luz Masters MD. Schedule an appointment as soon as possible for a visit in 2 days.    Specialty: Internal Medicine  Contact information:  [address] [facility]  [place] [address]  [phone]             [facility].    Specialty: Emergency Medicine  Why: If  symptoms worsen  Contact information:  [address] [facility] [address] [phone]                      Lanny Franks MD  12/29/20 [id number]

## 2020-12-28 NOTE — TELEPHONE ENCOUNTER
"Called and spoke with patient about recent symptoms.     Patient states that about Dec 20th, she began with intense migraine located all over head. Accompanied by "burning" in her neck, back and recently chest. Visited ER yesterday for fear of heart attack, however, upon EKG/Chest XR, heart attack ruled out.    States that the pain can get so intense that she becomes dizzy, light headed and extremely nauseous without emesis.     When inquired about medications, patient reports that she did miss two days dosing of Inderal as she misplaced medication temporarily. This occurred shortly before migraine cycle began. Reports that she has taken the diclofenac without relief.  Taking all other medications as directed.   "

## 2020-12-30 ENCOUNTER — PATIENT MESSAGE (OUTPATIENT)
Dept: NEUROLOGY | Facility: CLINIC | Age: 35
End: 2020-12-30

## 2021-01-13 ENCOUNTER — PATIENT MESSAGE (OUTPATIENT)
Dept: CARDIOLOGY | Facility: CLINIC | Age: 36
End: 2021-01-13

## 2021-01-27 ENCOUNTER — OFFICE VISIT (OUTPATIENT)
Dept: CARDIOLOGY | Facility: CLINIC | Age: 36
End: 2021-01-27
Payer: COMMERCIAL

## 2021-01-27 VITALS
BODY MASS INDEX: 33.13 KG/M2 | HEIGHT: 65 IN | DIASTOLIC BLOOD PRESSURE: 70 MMHG | HEART RATE: 105 BPM | OXYGEN SATURATION: 99 % | SYSTOLIC BLOOD PRESSURE: 114 MMHG | RESPIRATION RATE: 16 BRPM | WEIGHT: 198.88 LBS

## 2021-01-27 DIAGNOSIS — I10 ESSENTIAL HYPERTENSION: ICD-10-CM

## 2021-01-27 DIAGNOSIS — Z01.810 PREOP CARDIOVASCULAR EXAM: Primary | ICD-10-CM

## 2021-01-27 DIAGNOSIS — K21.9 GASTROESOPHAGEAL REFLUX DISEASE, UNSPECIFIED WHETHER ESOPHAGITIS PRESENT: ICD-10-CM

## 2021-01-27 DIAGNOSIS — R07.9 CHEST PAIN, UNSPECIFIED TYPE: ICD-10-CM

## 2021-01-27 PROCEDURE — 99999 PR PBB SHADOW E&M-EST. PATIENT-LVL IV: CPT | Mod: PBBFAC,,, | Performed by: INTERNAL MEDICINE

## 2021-01-27 PROCEDURE — 99214 PR OFFICE/OUTPT VISIT, EST, LEVL IV, 30-39 MIN: ICD-10-PCS | Mod: S$GLB,,, | Performed by: INTERNAL MEDICINE

## 2021-01-27 PROCEDURE — 3008F BODY MASS INDEX DOCD: CPT | Mod: CPTII,S$GLB,, | Performed by: INTERNAL MEDICINE

## 2021-01-27 PROCEDURE — 3078F PR MOST RECENT DIASTOLIC BLOOD PRESSURE < 80 MM HG: ICD-10-PCS | Mod: CPTII,S$GLB,, | Performed by: INTERNAL MEDICINE

## 2021-01-27 PROCEDURE — 3008F PR BODY MASS INDEX (BMI) DOCUMENTED: ICD-10-PCS | Mod: CPTII,S$GLB,, | Performed by: INTERNAL MEDICINE

## 2021-01-27 PROCEDURE — 3074F PR MOST RECENT SYSTOLIC BLOOD PRESSURE < 130 MM HG: ICD-10-PCS | Mod: CPTII,S$GLB,, | Performed by: INTERNAL MEDICINE

## 2021-01-27 PROCEDURE — 3078F DIAST BP <80 MM HG: CPT | Mod: CPTII,S$GLB,, | Performed by: INTERNAL MEDICINE

## 2021-01-27 PROCEDURE — 99214 OFFICE O/P EST MOD 30 MIN: CPT | Mod: S$GLB,,, | Performed by: INTERNAL MEDICINE

## 2021-01-27 PROCEDURE — 3074F SYST BP LT 130 MM HG: CPT | Mod: CPTII,S$GLB,, | Performed by: INTERNAL MEDICINE

## 2021-01-27 PROCEDURE — 99999 PR PBB SHADOW E&M-EST. PATIENT-LVL IV: ICD-10-PCS | Mod: PBBFAC,,, | Performed by: INTERNAL MEDICINE

## 2021-02-01 ENCOUNTER — HOSPITAL ENCOUNTER (OUTPATIENT)
Dept: CARDIOLOGY | Facility: HOSPITAL | Age: 36
Discharge: HOME | End: 2021-02-01
Attending: INTERNAL MEDICINE
Payer: COMMERCIAL

## 2021-02-01 VITALS — HEIGHT: 65 IN | WEIGHT: 198 LBS | BODY MASS INDEX: 32.99 KG/M2

## 2021-02-01 DIAGNOSIS — K21.9 GASTROESOPHAGEAL REFLUX DISEASE, UNSPECIFIED WHETHER ESOPHAGITIS PRESENT: ICD-10-CM

## 2021-02-01 DIAGNOSIS — I10 ESSENTIAL HYPERTENSION: ICD-10-CM

## 2021-02-01 DIAGNOSIS — R07.9 CHEST PAIN, UNSPECIFIED TYPE: ICD-10-CM

## 2021-02-01 LAB
AORTIC ROOT ANNULUS: 2.76 CM
AV INDEX (PROSTH): 0.76
AV MEAN GRADIENT: 3 MMHG
AV PEAK GRADIENT: 6 MMHG
AV VALVE AREA: 2.49 CM2
AV VELOCITY RATIO: 0.79
BSA FOR ECHO PROCEDURE: 2.03 M2
CV ECHO LV RWT: 0.57 CM
DOP CALC AO PEAK VEL: 1.19 M/S
DOP CALC AO VTI: 23.1 CM
DOP CALC LVOT AREA: 3.3 CM2
DOP CALC LVOT DIAMETER: 2.05 CM
DOP CALC LVOT PEAK VEL: 0.94 M/S
DOP CALC LVOT STROKE VOLUME: 57.6 CM3
DOP CALC RVOT PEAK VEL: 0.73 M/S
DOP CALC RVOT VTI: 17.16 CM
DOP CALCLVOT PEAK VEL VTI: 17.46 CM
E WAVE DECELERATION TIME: 152.99 MSEC
E/A RATIO: 0.89
E/E' RATIO: 6.32 M/S
ECHO LV POSTERIOR WALL: 1.17 CM (ref 0.6–1.1)
FRACTIONAL SHORTENING: 34 % (ref 28–44)
INTERVENTRICULAR SEPTUM: 1.26 CM (ref 0.6–1.1)
IVRT: 94.2 MSEC
LA MAJOR: 3.99 CM
LA MINOR: 3.33 CM
LA WIDTH: 3.25 CM
LEFT ATRIUM SIZE: 3.24 CM
LEFT ATRIUM VOLUME INDEX: 16.5 ML/M2
LEFT ATRIUM VOLUME: 32.49 CM3
LEFT INTERNAL DIMENSION IN SYSTOLE: 2.72 CM (ref 2.1–4)
LEFT VENTRICLE DIASTOLIC VOLUME INDEX: 37.76 ML/M2
LEFT VENTRICLE DIASTOLIC VOLUME: 74.39 ML
LEFT VENTRICLE MASS INDEX: 89 G/M2
LEFT VENTRICLE SYSTOLIC VOLUME INDEX: 14 ML/M2
LEFT VENTRICLE SYSTOLIC VOLUME: 27.6 ML
LEFT VENTRICULAR INTERNAL DIMENSION IN DIASTOLE: 4.1 CM (ref 3.5–6)
LEFT VENTRICULAR MASS: 174.92 G
LV LATERAL E/E' RATIO: 6.08 M/S
LV SEPTAL E/E' RATIO: 6.58 M/S
MV A" WAVE DURATION": 9.13 MSEC
MV PEAK A VEL: 0.89 M/S
MV PEAK E VEL: 0.79 M/S
PISA TR MAX VEL: 2.5 M/S
PULM VEIN S/D RATIO: 1.74
PV MEAN GRADIENT: 1 MMHG
PV PEAK D VEL: 0.34 M/S
PV PEAK S VEL: 0.59 M/S
PV PEAK VELOCITY: 1.17 CM/S
RA MAJOR: 4.4 CM
RA PRESSURE: 3 MMHG
RA WIDTH: 3.14 CM
RIGHT VENTRICULAR END-DIASTOLIC DIMENSION: 2.37 CM
SINUS: 2.22 CM
STJ: 1.97 CM
TDI LATERAL: 0.13 M/S
TDI SEPTAL: 0.12 M/S
TDI: 0.13 M/S
TR MAX PG: 25 MMHG
TRICUSPID ANNULAR PLANE SYSTOLIC EXCURSION: 1.78 CM
TV REST PULMONARY ARTERY PRESSURE: 28 MMHG

## 2021-02-01 PROCEDURE — 93306 TTE W/DOPPLER COMPLETE: CPT

## 2021-02-01 PROCEDURE — 93306 TTE W/DOPPLER COMPLETE: CPT | Mod: 26,,, | Performed by: INTERNAL MEDICINE

## 2021-02-01 PROCEDURE — 93306 ECHO (CUPID ONLY): ICD-10-PCS | Mod: 26,,, | Performed by: INTERNAL MEDICINE

## 2021-03-09 ENCOUNTER — PATIENT MESSAGE (OUTPATIENT)
Dept: NEUROLOGY | Facility: CLINIC | Age: 36
End: 2021-03-09

## 2021-07-06 ENCOUNTER — PATIENT MESSAGE (OUTPATIENT)
Dept: ADMINISTRATIVE | Facility: HOSPITAL | Age: 36
End: 2021-07-06

## 2021-08-17 ENCOUNTER — OFFICE VISIT (OUTPATIENT)
Dept: GASTROENTEROLOGY | Facility: CLINIC | Age: 36
End: 2021-08-17
Payer: COMMERCIAL

## 2021-08-17 VITALS — HEIGHT: 65 IN | WEIGHT: 190.94 LBS | BODY MASS INDEX: 31.81 KG/M2

## 2021-08-17 DIAGNOSIS — R10.9 ABDOMINAL PAIN, UNSPECIFIED ABDOMINAL LOCATION: ICD-10-CM

## 2021-08-17 DIAGNOSIS — K21.9 GASTROESOPHAGEAL REFLUX DISEASE, UNSPECIFIED WHETHER ESOPHAGITIS PRESENT: ICD-10-CM

## 2021-08-17 DIAGNOSIS — R10.13 DYSPEPSIA: ICD-10-CM

## 2021-08-17 DIAGNOSIS — Z01.812 PRE-PROCEDURE LAB EXAM: ICD-10-CM

## 2021-08-17 DIAGNOSIS — K59.04 CHRONIC IDIOPATHIC CONSTIPATION: Primary | ICD-10-CM

## 2021-08-17 PROCEDURE — 3008F BODY MASS INDEX DOCD: CPT | Mod: CPTII,S$GLB,, | Performed by: INTERNAL MEDICINE

## 2021-08-17 PROCEDURE — 99204 PR OFFICE/OUTPT VISIT, NEW, LEVL IV, 45-59 MIN: ICD-10-PCS | Mod: S$GLB,,, | Performed by: INTERNAL MEDICINE

## 2021-08-17 PROCEDURE — 3008F PR BODY MASS INDEX (BMI) DOCUMENTED: ICD-10-PCS | Mod: CPTII,S$GLB,, | Performed by: INTERNAL MEDICINE

## 2021-08-17 PROCEDURE — 1159F PR MEDICATION LIST DOCUMENTED IN MEDICAL RECORD: ICD-10-PCS | Mod: CPTII,S$GLB,, | Performed by: INTERNAL MEDICINE

## 2021-08-17 PROCEDURE — 99999 PR PBB SHADOW E&M-EST. PATIENT-LVL III: ICD-10-PCS | Mod: PBBFAC,,, | Performed by: INTERNAL MEDICINE

## 2021-08-17 PROCEDURE — 1125F PR PAIN SEVERITY QUANTIFIED, PAIN PRESENT: ICD-10-PCS | Mod: CPTII,S$GLB,, | Performed by: INTERNAL MEDICINE

## 2021-08-17 PROCEDURE — 1159F MED LIST DOCD IN RCRD: CPT | Mod: CPTII,S$GLB,, | Performed by: INTERNAL MEDICINE

## 2021-08-17 PROCEDURE — 1125F AMNT PAIN NOTED PAIN PRSNT: CPT | Mod: CPTII,S$GLB,, | Performed by: INTERNAL MEDICINE

## 2021-08-17 PROCEDURE — 99999 PR PBB SHADOW E&M-EST. PATIENT-LVL III: CPT | Mod: PBBFAC,,, | Performed by: INTERNAL MEDICINE

## 2021-08-17 PROCEDURE — 99204 OFFICE O/P NEW MOD 45 MIN: CPT | Mod: S$GLB,,, | Performed by: INTERNAL MEDICINE

## 2021-08-17 RX ORDER — PRUCALOPRIDE 2 MG/1
2 TABLET, FILM COATED ORAL DAILY
Qty: 30 TABLET | Refills: 3 | Status: SHIPPED | OUTPATIENT
Start: 2021-08-17 | End: 2021-12-28

## 2021-08-17 RX ORDER — POLYETHYLENE GLYCOL 3350, SODIUM SULFATE ANHYDROUS, SODIUM BICARBONATE, SODIUM CHLORIDE, POTASSIUM CHLORIDE 236; 22.74; 6.74; 5.86; 2.97 G/4L; G/4L; G/4L; G/4L; G/4L
4 POWDER, FOR SOLUTION ORAL ONCE
Qty: 4000 ML | Refills: 0 | Status: SHIPPED | OUTPATIENT
Start: 2021-08-17 | End: 2021-08-19

## 2021-08-17 RX ORDER — LOSARTAN POTASSIUM 50 MG/1
50 TABLET ORAL DAILY
COMMUNITY
End: 2021-12-28

## 2021-08-23 ENCOUNTER — TELEPHONE (OUTPATIENT)
Dept: GASTROENTEROLOGY | Facility: CLINIC | Age: 36
End: 2021-08-23

## 2021-08-25 ENCOUNTER — LAB VISIT (OUTPATIENT)
Dept: PRIMARY CARE CLINIC | Facility: OTHER | Age: 36
End: 2021-08-25
Payer: COMMERCIAL

## 2021-08-25 DIAGNOSIS — Z20.822 ENCOUNTER FOR LABORATORY TESTING FOR COVID-19 VIRUS: ICD-10-CM

## 2021-08-25 PROCEDURE — U0003 INFECTIOUS AGENT DETECTION BY NUCLEIC ACID (DNA OR RNA); SEVERE ACUTE RESPIRATORY SYNDROME CORONAVIRUS 2 (SARS-COV-2) (CORONAVIRUS DISEASE [COVID-19]), AMPLIFIED PROBE TECHNIQUE, MAKING USE OF HIGH THROUGHPUT TECHNOLOGIES AS DESCRIBED BY CMS-2020-01-R: HCPCS

## 2021-08-27 LAB
SARS-COV-2 RNA RESP QL NAA+PROBE: NOT DETECTED
SARS-COV-2- CYCLE NUMBER: NORMAL

## 2021-09-18 ENCOUNTER — HOSPITAL ENCOUNTER (EMERGENCY)
Facility: HOSPITAL | Age: 36
Discharge: HOME | End: 2021-09-18
Attending: FAMILY MEDICINE
Payer: COMMERCIAL

## 2021-09-18 VITALS
HEIGHT: 64 IN | HEART RATE: 87 BPM | OXYGEN SATURATION: 99 % | BODY MASS INDEX: 32.78 KG/M2 | SYSTOLIC BLOOD PRESSURE: 124 MMHG | RESPIRATION RATE: 18 BRPM | WEIGHT: 192 LBS | TEMPERATURE: 98 F | DIASTOLIC BLOOD PRESSURE: 84 MMHG

## 2021-09-18 DIAGNOSIS — M79.673 FOOT PAIN: ICD-10-CM

## 2021-09-18 DIAGNOSIS — S91.331A PUNCTURE WOUND OF RIGHT FOOT, INITIAL ENCOUNTER: Primary | ICD-10-CM

## 2021-09-18 LAB — B-HCG UR QL: NEGATIVE

## 2021-09-18 PROCEDURE — 63600175 PHARM REV CODE 636 W HCPCS: Mod: ER | Performed by: PHYSICIAN ASSISTANT

## 2021-09-18 PROCEDURE — 90471 IMMUNIZATION ADMIN: CPT | Mod: ER | Performed by: PHYSICIAN ASSISTANT

## 2021-09-18 PROCEDURE — 96372 THER/PROPH/DIAG INJ SC/IM: CPT | Mod: ER

## 2021-09-18 PROCEDURE — 99284 EMERGENCY DEPT VISIT MOD MDM: CPT | Mod: 25,ER

## 2021-09-18 PROCEDURE — 90715 TDAP VACCINE 7 YRS/> IM: CPT | Mod: ER | Performed by: PHYSICIAN ASSISTANT

## 2021-09-18 PROCEDURE — 81025 URINE PREGNANCY TEST: CPT | Mod: ER | Performed by: FAMILY MEDICINE

## 2021-09-18 PROCEDURE — 25000003 PHARM REV CODE 250: Mod: ER | Performed by: PHYSICIAN ASSISTANT

## 2021-09-18 RX ORDER — KETOROLAC TROMETHAMINE 10 MG/1
10 TABLET, FILM COATED ORAL EVERY 6 HOURS
Qty: 10 TABLET | Refills: 0 | Status: SHIPPED | OUTPATIENT
Start: 2021-09-18 | End: 2021-09-18 | Stop reason: SDUPTHER

## 2021-09-18 RX ORDER — LEVOFLOXACIN 750 MG/1
750 TABLET ORAL
Status: COMPLETED | OUTPATIENT
Start: 2021-09-18 | End: 2021-09-18

## 2021-09-18 RX ORDER — CEPHALEXIN 250 MG/1
250 CAPSULE ORAL 4 TIMES DAILY
Qty: 28 CAPSULE | Refills: 0 | Status: SHIPPED | OUTPATIENT
Start: 2021-09-18 | End: 2021-09-25

## 2021-09-18 RX ORDER — CEPHALEXIN 500 MG/1
500 CAPSULE ORAL
Status: COMPLETED | OUTPATIENT
Start: 2021-09-18 | End: 2021-09-18

## 2021-09-18 RX ORDER — KETOROLAC TROMETHAMINE 10 MG/1
10 TABLET, FILM COATED ORAL EVERY 6 HOURS
Qty: 10 TABLET | Refills: 0 | Status: SHIPPED | OUTPATIENT
Start: 2021-09-18 | End: 2021-12-28

## 2021-09-18 RX ORDER — LEVOFLOXACIN 250 MG/1
500 TABLET ORAL DAILY
Qty: 14 TABLET | Refills: 0 | Status: SHIPPED | OUTPATIENT
Start: 2021-09-18 | End: 2021-09-25

## 2021-09-18 RX ORDER — LEVOFLOXACIN 500 MG/1
500 TABLET, FILM COATED ORAL
Status: DISCONTINUED | OUTPATIENT
Start: 2021-09-18 | End: 2021-09-18

## 2021-09-18 RX ORDER — CEPHALEXIN 500 MG/1
500 CAPSULE ORAL
Status: DISCONTINUED | OUTPATIENT
Start: 2021-09-18 | End: 2021-09-18

## 2021-09-18 RX ORDER — CIPROFLOXACIN 500 MG/1
500 TABLET ORAL
Status: DISCONTINUED | OUTPATIENT
Start: 2021-09-18 | End: 2021-09-18

## 2021-09-18 RX ORDER — SULFAMETHOXAZOLE AND TRIMETHOPRIM 800; 160 MG/1; MG/1
1 TABLET ORAL
Status: DISCONTINUED | OUTPATIENT
Start: 2021-09-18 | End: 2021-09-18

## 2021-09-18 RX ORDER — KETOROLAC TROMETHAMINE 30 MG/ML
30 INJECTION, SOLUTION INTRAMUSCULAR; INTRAVENOUS
Status: COMPLETED | OUTPATIENT
Start: 2021-09-18 | End: 2021-09-18

## 2021-09-18 RX ADMIN — KETOROLAC TROMETHAMINE 30 MG: 30 INJECTION, SOLUTION INTRAMUSCULAR at 09:09

## 2021-09-18 RX ADMIN — CEPHALEXIN 500 MG: 500 CAPSULE ORAL at 10:09

## 2021-09-18 RX ADMIN — CLOSTRIDIUM TETANI TOXOID ANTIGEN (FORMALDEHYDE INACTIVATED), CORYNEBACTERIUM DIPHTHERIAE TOXOID ANTIGEN (FORMALDEHYDE INACTIVATED), BORDETELLA PERTUSSIS TOXOID ANTIGEN (GLUTARALDEHYDE INACTIVATED), BORDETELLA PERTUSSIS FILAMENTOUS HEMAGGLUTININ ANTIGEN (FORMALDEHYDE INACTIVATED), BORDETELLA PERTUSSIS PERTACTIN ANTIGEN, AND BORDETELLA PERTUSSIS FIMBRIAE 2/3 ANTIGEN 0.5 ML: 5; 2; 2.5; 5; 3; 5 INJECTION, SUSPENSION INTRAMUSCULAR at 08:09

## 2021-09-18 RX ADMIN — LEVOFLOXACIN 750 MG: 750 TABLET, FILM COATED ORAL at 10:09

## 2021-09-18 RX ADMIN — BACITRACIN, NEOMYCIN, POLYMYXIN B 1 EACH: 400; 3.5; 5 OINTMENT TOPICAL at 09:09

## 2021-09-29 ENCOUNTER — TELEPHONE (OUTPATIENT)
Dept: ENDOSCOPY | Facility: HOSPITAL | Age: 36
End: 2021-09-29

## 2021-09-30 ENCOUNTER — TELEPHONE (OUTPATIENT)
Dept: ENDOSCOPY | Facility: HOSPITAL | Age: 36
End: 2021-09-30

## 2021-10-04 ENCOUNTER — PATIENT MESSAGE (OUTPATIENT)
Dept: ADMINISTRATIVE | Facility: HOSPITAL | Age: 36
End: 2021-10-04

## 2021-10-18 ENCOUNTER — TELEPHONE (OUTPATIENT)
Dept: ENDOSCOPY | Facility: HOSPITAL | Age: 36
End: 2021-10-18

## 2021-10-19 ENCOUNTER — TELEPHONE (OUTPATIENT)
Dept: ENDOSCOPY | Facility: HOSPITAL | Age: 36
End: 2021-10-19

## 2021-10-20 ENCOUNTER — TELEPHONE (OUTPATIENT)
Dept: GASTROENTEROLOGY | Facility: CLINIC | Age: 36
End: 2021-10-20

## 2021-10-20 ENCOUNTER — HOSPITAL ENCOUNTER (OUTPATIENT)
Facility: HOSPITAL | Age: 36
Discharge: HOME | End: 2021-10-20
Attending: INTERNAL MEDICINE | Admitting: INTERNAL MEDICINE
Payer: COMMERCIAL

## 2021-10-20 ENCOUNTER — ANESTHESIA EVENT (OUTPATIENT)
Dept: ENDOSCOPY | Facility: HOSPITAL | Age: 36
End: 2021-10-20
Payer: COMMERCIAL

## 2021-10-20 ENCOUNTER — ANESTHESIA (OUTPATIENT)
Dept: ENDOSCOPY | Facility: HOSPITAL | Age: 36
End: 2021-10-20
Payer: COMMERCIAL

## 2021-10-20 VITALS
OXYGEN SATURATION: 100 % | DIASTOLIC BLOOD PRESSURE: 72 MMHG | HEIGHT: 65 IN | WEIGHT: 190 LBS | SYSTOLIC BLOOD PRESSURE: 118 MMHG | TEMPERATURE: 98 F | HEART RATE: 70 BPM | RESPIRATION RATE: 16 BRPM | BODY MASS INDEX: 31.65 KG/M2

## 2021-10-20 DIAGNOSIS — K59.04 CHRONIC IDIOPATHIC CONSTIPATION: Primary | ICD-10-CM

## 2021-10-20 DIAGNOSIS — K21.9 GERD (GASTROESOPHAGEAL REFLUX DISEASE): ICD-10-CM

## 2021-10-20 LAB
B-HCG UR QL: NEGATIVE
CTP QC/QA: YES

## 2021-10-20 PROCEDURE — 88305 TISSUE EXAM BY PATHOLOGIST: CPT | Performed by: PATHOLOGY

## 2021-10-20 PROCEDURE — 88305 TISSUE EXAM BY PATHOLOGIST: CPT | Mod: 26,,, | Performed by: PATHOLOGY

## 2021-10-20 PROCEDURE — 45378 DIAGNOSTIC COLONOSCOPY: CPT | Performed by: INTERNAL MEDICINE

## 2021-10-20 PROCEDURE — 37000009 HC ANESTHESIA EA ADD 15 MINS: Performed by: INTERNAL MEDICINE

## 2021-10-20 PROCEDURE — 43239 EGD BIOPSY SINGLE/MULTIPLE: CPT | Performed by: INTERNAL MEDICINE

## 2021-10-20 PROCEDURE — 45378 DIAGNOSTIC COLONOSCOPY: CPT | Mod: ,,, | Performed by: INTERNAL MEDICINE

## 2021-10-20 PROCEDURE — 25000003 PHARM REV CODE 250: Performed by: NURSE ANESTHETIST, CERTIFIED REGISTERED

## 2021-10-20 PROCEDURE — 43239 PR EGD, FLEX, W/BIOPSY, SGL/MULTI: ICD-10-PCS | Mod: 51,,, | Performed by: INTERNAL MEDICINE

## 2021-10-20 PROCEDURE — 25000003 PHARM REV CODE 250: Performed by: INTERNAL MEDICINE

## 2021-10-20 PROCEDURE — 37000008 HC ANESTHESIA 1ST 15 MINUTES: Performed by: INTERNAL MEDICINE

## 2021-10-20 PROCEDURE — 43239 EGD BIOPSY SINGLE/MULTIPLE: CPT | Mod: 51,,, | Performed by: INTERNAL MEDICINE

## 2021-10-20 PROCEDURE — 27201012 HC FORCEPS, HOT/COLD, DISP: Performed by: INTERNAL MEDICINE

## 2021-10-20 PROCEDURE — 45378 PR COLONOSCOPY,DIAGNOSTIC: ICD-10-PCS | Mod: ,,, | Performed by: INTERNAL MEDICINE

## 2021-10-20 PROCEDURE — 63600175 PHARM REV CODE 636 W HCPCS: Performed by: NURSE ANESTHETIST, CERTIFIED REGISTERED

## 2021-10-20 PROCEDURE — 81025 URINE PREGNANCY TEST: CPT | Performed by: INTERNAL MEDICINE

## 2021-10-20 PROCEDURE — 88305 TISSUE EXAM BY PATHOLOGIST: ICD-10-PCS | Mod: 26,,, | Performed by: PATHOLOGY

## 2021-10-20 RX ORDER — SODIUM CHLORIDE 9 MG/ML
INJECTION, SOLUTION INTRAVENOUS CONTINUOUS
Status: DISCONTINUED | OUTPATIENT
Start: 2021-10-20 | End: 2021-10-20 | Stop reason: HOSPADM

## 2021-10-20 RX ORDER — PROPOFOL 10 MG/ML
VIAL (ML) INTRAVENOUS CONTINUOUS PRN
Status: DISCONTINUED | OUTPATIENT
Start: 2021-10-20 | End: 2021-10-20

## 2021-10-20 RX ORDER — SODIUM CHLORIDE 0.9 % (FLUSH) 0.9 %
10 SYRINGE (ML) INJECTION
Status: DISCONTINUED | OUTPATIENT
Start: 2021-10-20 | End: 2021-10-20 | Stop reason: HOSPADM

## 2021-10-20 RX ORDER — BUTALBITAL, ACETAMINOPHEN AND CAFFEINE 50; 325; 40 MG/1; MG/1; MG/1
1 TABLET ORAL EVERY 4 HOURS PRN
COMMUNITY
End: 2022-11-28

## 2021-10-20 RX ORDER — AMLODIPINE BESYLATE 5 MG/1
5 TABLET ORAL DAILY
COMMUNITY
End: 2022-05-10

## 2021-10-20 RX ORDER — PROPOFOL 10 MG/ML
VIAL (ML) INTRAVENOUS
Status: DISCONTINUED | OUTPATIENT
Start: 2021-10-20 | End: 2021-10-20

## 2021-10-20 RX ORDER — LIDOCAINE HYDROCHLORIDE 20 MG/ML
INJECTION INTRAVENOUS
Status: DISCONTINUED | OUTPATIENT
Start: 2021-10-20 | End: 2021-10-20

## 2021-10-20 RX ORDER — FENTANYL CITRATE 50 UG/ML
INJECTION, SOLUTION INTRAMUSCULAR; INTRAVENOUS
Status: DISCONTINUED | OUTPATIENT
Start: 2021-10-20 | End: 2021-10-20

## 2021-10-20 RX ADMIN — PROPOFOL 100 MG: 10 INJECTION, EMULSION INTRAVENOUS at 08:10

## 2021-10-20 RX ADMIN — PROPOFOL 150 MCG/KG/MIN: 10 INJECTION, EMULSION INTRAVENOUS at 08:10

## 2021-10-20 RX ADMIN — FENTANYL CITRATE 25 MCG: 50 INJECTION, SOLUTION INTRAMUSCULAR; INTRAVENOUS at 08:10

## 2021-10-20 RX ADMIN — FENTANYL CITRATE 50 MCG: 50 INJECTION, SOLUTION INTRAMUSCULAR; INTRAVENOUS at 08:10

## 2021-10-20 RX ADMIN — LIDOCAINE HYDROCHLORIDE 100 MG: 20 INJECTION, SOLUTION INTRAVENOUS at 08:10

## 2021-10-20 RX ADMIN — SODIUM CHLORIDE: 0.9 INJECTION, SOLUTION INTRAVENOUS at 07:10

## 2021-10-20 RX ADMIN — TOPICAL ANESTHETIC 1 EACH: 200 SPRAY DENTAL; PERIODONTAL at 08:10

## 2021-10-22 LAB
FINAL PATHOLOGIC DIAGNOSIS: NORMAL
GROSS: NORMAL
Lab: NORMAL

## 2021-10-28 ENCOUNTER — TELEPHONE (OUTPATIENT)
Dept: GASTROENTEROLOGY | Facility: CLINIC | Age: 36
End: 2021-10-28

## 2021-12-27 ENCOUNTER — HOSPITAL ENCOUNTER (OUTPATIENT)
Dept: RADIOLOGY | Facility: HOSPITAL | Age: 36
Discharge: HOME | End: 2021-12-27
Attending: FAMILY MEDICINE
Payer: COMMERCIAL

## 2021-12-27 DIAGNOSIS — I10 ESSENTIAL HYPERTENSION: Primary | ICD-10-CM

## 2021-12-27 DIAGNOSIS — M54.2 NECK PAIN: Primary | ICD-10-CM

## 2021-12-27 DIAGNOSIS — M54.9 BACK PAIN: ICD-10-CM

## 2021-12-27 DIAGNOSIS — M54.2 NECK PAIN: ICD-10-CM

## 2021-12-27 PROCEDURE — 72050 XR CERVICAL SPINE AP LAT WITH FLEX EXTEN: ICD-10-PCS | Mod: 26,,, | Performed by: RADIOLOGY

## 2021-12-27 PROCEDURE — 72120 XR LUMBAR SPINE FLEXION AND EXTENSION ONLY: ICD-10-PCS | Mod: 26,,, | Performed by: RADIOLOGY

## 2021-12-27 PROCEDURE — 72050 X-RAY EXAM NECK SPINE 4/5VWS: CPT | Mod: 26,,, | Performed by: RADIOLOGY

## 2021-12-27 PROCEDURE — 72120 X-RAY BEND ONLY L-S SPINE: CPT | Mod: TC,FY

## 2021-12-27 PROCEDURE — 72120 X-RAY BEND ONLY L-S SPINE: CPT | Mod: 26,,, | Performed by: RADIOLOGY

## 2021-12-27 PROCEDURE — 72050 X-RAY EXAM NECK SPINE 4/5VWS: CPT | Mod: TC,FY

## 2021-12-28 ENCOUNTER — OFFICE VISIT (OUTPATIENT)
Dept: SLEEP MEDICINE | Facility: CLINIC | Age: 36
End: 2021-12-28
Payer: COMMERCIAL

## 2021-12-28 ENCOUNTER — TELEPHONE (OUTPATIENT)
Dept: OBSTETRICS AND GYNECOLOGY | Facility: CLINIC | Age: 36
End: 2021-12-28

## 2021-12-28 ENCOUNTER — HOSPITAL ENCOUNTER (OUTPATIENT)
Dept: RADIOLOGY | Facility: HOSPITAL | Age: 36
Discharge: HOME | End: 2021-12-28
Attending: NURSE PRACTITIONER
Payer: COMMERCIAL

## 2021-12-28 ENCOUNTER — OFFICE VISIT (OUTPATIENT)
Dept: OBSTETRICS AND GYNECOLOGY | Facility: CLINIC | Age: 36
End: 2021-12-28
Payer: COMMERCIAL

## 2021-12-28 VITALS
HEIGHT: 62 IN | WEIGHT: 195.31 LBS | DIASTOLIC BLOOD PRESSURE: 82 MMHG | SYSTOLIC BLOOD PRESSURE: 122 MMHG | BODY MASS INDEX: 35.94 KG/M2

## 2021-12-28 VITALS
HEART RATE: 78 BPM | BODY MASS INDEX: 32.8 KG/M2 | WEIGHT: 196.88 LBS | SYSTOLIC BLOOD PRESSURE: 123 MMHG | HEIGHT: 65 IN | DIASTOLIC BLOOD PRESSURE: 82 MMHG

## 2021-12-28 DIAGNOSIS — G47.30 SLEEP APNEA, UNSPECIFIED TYPE: ICD-10-CM

## 2021-12-28 DIAGNOSIS — R10.2 PELVIC PAIN: Primary | ICD-10-CM

## 2021-12-28 DIAGNOSIS — I10 ESSENTIAL HYPERTENSION: ICD-10-CM

## 2021-12-28 DIAGNOSIS — R10.2 PELVIC PAIN: ICD-10-CM

## 2021-12-28 DIAGNOSIS — G43.009 MIGRAINE WITHOUT AURA AND WITHOUT STATUS MIGRAINOSUS, NOT INTRACTABLE: Primary | ICD-10-CM

## 2021-12-28 PROCEDURE — 3008F BODY MASS INDEX DOCD: CPT | Mod: CPTII,S$GLB,, | Performed by: NURSE PRACTITIONER

## 2021-12-28 PROCEDURE — 87481 CANDIDA DNA AMP PROBE: CPT | Mod: 59 | Performed by: NURSE PRACTITIONER

## 2021-12-28 PROCEDURE — 1160F PR REVIEW ALL MEDS BY PRESCRIBER/CLIN PHARMACIST DOCUMENTED: ICD-10-PCS | Mod: CPTII,S$GLB,, | Performed by: NURSE PRACTITIONER

## 2021-12-28 PROCEDURE — 3079F DIAST BP 80-89 MM HG: CPT | Mod: CPTII,S$GLB,, | Performed by: NURSE PRACTITIONER

## 2021-12-28 PROCEDURE — 1160F RVW MEDS BY RX/DR IN RCRD: CPT | Mod: CPTII,S$GLB,, | Performed by: NURSE PRACTITIONER

## 2021-12-28 PROCEDURE — 81002 PR URINALYSIS NONAUTO W/O SCOPE: ICD-10-PCS | Mod: S$GLB,,, | Performed by: NURSE PRACTITIONER

## 2021-12-28 PROCEDURE — 87591 N.GONORRHOEAE DNA AMP PROB: CPT | Mod: 59 | Performed by: NURSE PRACTITIONER

## 2021-12-28 PROCEDURE — 74018 RADEX ABDOMEN 1 VIEW: CPT | Mod: 26,,, | Performed by: RADIOLOGY

## 2021-12-28 PROCEDURE — 99999 PR PBB SHADOW E&M-EST. PATIENT-LVL II: CPT | Mod: PBBFAC,,, | Performed by: NURSE PRACTITIONER

## 2021-12-28 PROCEDURE — 3079F PR MOST RECENT DIASTOLIC BLOOD PRESSURE 80-89 MM HG: ICD-10-PCS | Mod: CPTII,S$GLB,, | Performed by: NURSE PRACTITIONER

## 2021-12-28 PROCEDURE — 4010F PR ACE/ARB THEARPY RXD/TAKEN: ICD-10-PCS | Mod: CPTII,S$GLB,, | Performed by: NURSE PRACTITIONER

## 2021-12-28 PROCEDURE — 99999 PR PBB SHADOW E&M-EST. PATIENT-LVL III: CPT | Mod: PBBFAC,,, | Performed by: NURSE PRACTITIONER

## 2021-12-28 PROCEDURE — 99214 PR OFFICE/OUTPT VISIT, EST, LEVL IV, 30-39 MIN: ICD-10-PCS | Mod: S$GLB,,, | Performed by: NURSE PRACTITIONER

## 2021-12-28 PROCEDURE — 3074F SYST BP LT 130 MM HG: CPT | Mod: CPTII,S$GLB,, | Performed by: NURSE PRACTITIONER

## 2021-12-28 PROCEDURE — 99214 OFFICE O/P EST MOD 30 MIN: CPT | Mod: S$GLB,,, | Performed by: NURSE PRACTITIONER

## 2021-12-28 PROCEDURE — 3008F PR BODY MASS INDEX (BMI) DOCUMENTED: ICD-10-PCS | Mod: CPTII,S$GLB,, | Performed by: NURSE PRACTITIONER

## 2021-12-28 PROCEDURE — 99999 PR PBB SHADOW E&M-EST. PATIENT-LVL II: ICD-10-PCS | Mod: PBBFAC,,, | Performed by: NURSE PRACTITIONER

## 2021-12-28 PROCEDURE — 74018 RADEX ABDOMEN 1 VIEW: CPT | Mod: TC

## 2021-12-28 PROCEDURE — 81025 PR  URINE PREGNANCY TEST: ICD-10-PCS | Mod: S$GLB,,, | Performed by: NURSE PRACTITIONER

## 2021-12-28 PROCEDURE — 81025 URINE PREGNANCY TEST: CPT | Mod: S$GLB,,, | Performed by: NURSE PRACTITIONER

## 2021-12-28 PROCEDURE — 3074F PR MOST RECENT SYSTOLIC BLOOD PRESSURE < 130 MM HG: ICD-10-PCS | Mod: CPTII,S$GLB,, | Performed by: NURSE PRACTITIONER

## 2021-12-28 PROCEDURE — 1159F PR MEDICATION LIST DOCUMENTED IN MEDICAL RECORD: ICD-10-PCS | Mod: CPTII,S$GLB,, | Performed by: NURSE PRACTITIONER

## 2021-12-28 PROCEDURE — 99999 PR PBB SHADOW E&M-EST. PATIENT-LVL III: ICD-10-PCS | Mod: PBBFAC,,, | Performed by: NURSE PRACTITIONER

## 2021-12-28 PROCEDURE — 99214 OFFICE O/P EST MOD 30 MIN: CPT | Mod: 25,S$GLB,, | Performed by: NURSE PRACTITIONER

## 2021-12-28 PROCEDURE — 74018 XR ABDOMEN AP 1 VIEW: ICD-10-PCS | Mod: 26,,, | Performed by: RADIOLOGY

## 2021-12-28 PROCEDURE — 81002 URINALYSIS NONAUTO W/O SCOPE: CPT | Mod: S$GLB,,, | Performed by: NURSE PRACTITIONER

## 2021-12-28 PROCEDURE — 99214 PR OFFICE/OUTPT VISIT, EST, LEVL IV, 30-39 MIN: ICD-10-PCS | Mod: 25,S$GLB,, | Performed by: NURSE PRACTITIONER

## 2021-12-28 PROCEDURE — 87491 CHLMYD TRACH DNA AMP PROBE: CPT | Performed by: NURSE PRACTITIONER

## 2021-12-28 PROCEDURE — 1159F MED LIST DOCD IN RCRD: CPT | Mod: CPTII,S$GLB,, | Performed by: NURSE PRACTITIONER

## 2021-12-28 PROCEDURE — 4010F ACE/ARB THERAPY RXD/TAKEN: CPT | Mod: CPTII,S$GLB,, | Performed by: NURSE PRACTITIONER

## 2021-12-28 RX ORDER — GABAPENTIN 100 MG/1
CAPSULE ORAL
Status: ON HOLD | COMMUNITY
Start: 2021-12-24 | End: 2023-07-11 | Stop reason: HOSPADM

## 2021-12-28 RX ORDER — NAPROXEN 500 MG/1
500 TABLET ORAL 2 TIMES DAILY PRN
COMMUNITY
Start: 2021-12-23 | End: 2022-07-27

## 2021-12-28 RX ORDER — RIMEGEPANT SULFATE 75 MG/75MG
75 TABLET, ORALLY DISINTEGRATING ORAL ONCE AS NEEDED
COMMUNITY
End: 2024-01-29

## 2021-12-28 RX ORDER — ATOGEPANT 60 MG/1
60 TABLET ORAL DAILY
Qty: 30 TABLET | Refills: 5 | Status: SHIPPED | OUTPATIENT
Start: 2021-12-28 | End: 2022-11-28

## 2021-12-28 RX ORDER — PROPRANOLOL HYDROCHLORIDE 20 MG/1
20 TABLET ORAL DAILY
COMMUNITY
End: 2022-05-10

## 2021-12-29 ENCOUNTER — TELEPHONE (OUTPATIENT)
Dept: OBSTETRICS AND GYNECOLOGY | Facility: CLINIC | Age: 36
End: 2021-12-29

## 2021-12-30 ENCOUNTER — TELEPHONE (OUTPATIENT)
Dept: SLEEP MEDICINE | Facility: OTHER | Age: 36
End: 2021-12-30

## 2022-01-04 ENCOUNTER — PATIENT MESSAGE (OUTPATIENT)
Dept: OBSTETRICS AND GYNECOLOGY | Facility: CLINIC | Age: 37
End: 2022-01-04
Payer: MEDICAID

## 2022-01-04 ENCOUNTER — PATIENT MESSAGE (OUTPATIENT)
Dept: GASTROENTEROLOGY | Facility: CLINIC | Age: 37
End: 2022-01-04
Payer: MEDICAID

## 2022-01-04 LAB
BACTERIAL VAGINOSIS DNA: POSITIVE
CANDIDA GLABRATA DNA: NEGATIVE
CANDIDA KRUSEI DNA: NEGATIVE
CANDIDA RRNA VAG QL PROBE: NEGATIVE
T VAGINALIS RRNA GENITAL QL PROBE: NEGATIVE

## 2022-01-05 DIAGNOSIS — B96.89 BACTERIAL VAGINOSIS: Primary | ICD-10-CM

## 2022-01-05 DIAGNOSIS — N76.0 BACTERIAL VAGINOSIS: Primary | ICD-10-CM

## 2022-01-05 LAB
C TRACH DNA SPEC QL NAA+PROBE: NOT DETECTED
N GONORRHOEA DNA SPEC QL NAA+PROBE: NOT DETECTED

## 2022-01-05 RX ORDER — METRONIDAZOLE 500 MG/1
500 TABLET ORAL 2 TIMES DAILY
Qty: 14 TABLET | Refills: 0 | Status: SHIPPED | OUTPATIENT
Start: 2022-01-05 | End: 2022-01-12

## 2022-01-06 ENCOUNTER — PATIENT MESSAGE (OUTPATIENT)
Dept: GASTROENTEROLOGY | Facility: CLINIC | Age: 37
End: 2022-01-06
Payer: MEDICAID

## 2022-01-06 ENCOUNTER — TELEPHONE (OUTPATIENT)
Dept: SLEEP MEDICINE | Facility: OTHER | Age: 37
End: 2022-01-06
Payer: MEDICAID

## 2022-01-10 ENCOUNTER — PATIENT MESSAGE (OUTPATIENT)
Dept: GASTROENTEROLOGY | Facility: CLINIC | Age: 37
End: 2022-01-10
Payer: MEDICAID

## 2022-01-11 ENCOUNTER — HOSPITAL ENCOUNTER (OUTPATIENT)
Dept: RADIOLOGY | Facility: HOSPITAL | Age: 37
Discharge: HOME | End: 2022-01-11
Attending: INTERNAL MEDICINE
Payer: MEDICAID

## 2022-01-11 ENCOUNTER — TELEPHONE (OUTPATIENT)
Dept: SLEEP MEDICINE | Facility: OTHER | Age: 37
End: 2022-01-11
Payer: MEDICAID

## 2022-01-11 DIAGNOSIS — K59.04 CHRONIC IDIOPATHIC CONSTIPATION: ICD-10-CM

## 2022-01-11 PROCEDURE — 74270 X-RAY XM COLON 1CNTRST STD: CPT | Mod: 26,,, | Performed by: RADIOLOGY

## 2022-01-11 PROCEDURE — 25500020 PHARM REV CODE 255: Performed by: INTERNAL MEDICINE

## 2022-01-11 PROCEDURE — 74270 FL DEFECOGRAM: ICD-10-PCS | Mod: 26,,, | Performed by: RADIOLOGY

## 2022-01-11 PROCEDURE — 74270 X-RAY XM COLON 1CNTRST STD: CPT | Mod: TC

## 2022-01-11 PROCEDURE — A9698 NON-RAD CONTRAST MATERIALNOC: HCPCS | Performed by: INTERNAL MEDICINE

## 2022-01-11 RX ADMIN — BARIUM SULFATE 60 ML: 0.6 SUSPENSION ORAL at 10:01

## 2022-01-11 RX ADMIN — BARIUM SULFATE 454 G: 0.6 CREAM ORAL at 10:01

## 2022-01-18 ENCOUNTER — TELEPHONE (OUTPATIENT)
Dept: SLEEP MEDICINE | Facility: OTHER | Age: 37
End: 2022-01-18
Payer: MEDICAID

## 2022-01-18 DIAGNOSIS — N81.6 RECTOCELE: Primary | ICD-10-CM

## 2022-01-18 NOTE — TELEPHONE ENCOUNTER
Talked to patient and left messages to schedule her home sleep study,no response.  Sent out a message through [employer] to schedule.

## 2022-01-25 ENCOUNTER — PATIENT MESSAGE (OUTPATIENT)
Dept: ADMINISTRATIVE | Facility: HOSPITAL | Age: 37
End: 2022-01-25
Payer: MEDICAID

## 2022-02-03 ENCOUNTER — TELEPHONE (OUTPATIENT)
Dept: SURGERY | Facility: CLINIC | Age: 37
End: 2022-02-03
Payer: MEDICAID

## 2022-02-09 ENCOUNTER — TELEPHONE (OUTPATIENT)
Dept: SLEEP MEDICINE | Facility: OTHER | Age: 37
End: 2022-02-09
Payer: MEDICAID

## 2022-02-09 NOTE — TELEPHONE ENCOUNTER
I talked to patient left messages and sent out a message through [employer] to schedule the home sleep study.  No response.

## 2022-02-23 ENCOUNTER — TELEPHONE (OUTPATIENT)
Dept: SURGERY | Facility: CLINIC | Age: 37
End: 2022-02-23
Payer: MEDICAID

## 2022-02-24 ENCOUNTER — TELEPHONE (OUTPATIENT)
Dept: SURGERY | Facility: CLINIC | Age: 37
End: 2022-02-24
Payer: MEDICAID

## 2022-03-16 ENCOUNTER — PATIENT MESSAGE (OUTPATIENT)
Dept: ADMINISTRATIVE | Facility: HOSPITAL | Age: 37
End: 2022-03-16
Payer: MEDICAID

## 2022-05-04 ENCOUNTER — PATIENT MESSAGE (OUTPATIENT)
Dept: OBSTETRICS AND GYNECOLOGY | Facility: CLINIC | Age: 37
End: 2022-05-04
Payer: MEDICAID

## 2022-05-10 ENCOUNTER — OFFICE VISIT (OUTPATIENT)
Dept: OBSTETRICS AND GYNECOLOGY | Facility: CLINIC | Age: 37
End: 2022-05-10
Payer: MEDICAID

## 2022-05-10 ENCOUNTER — HOSPITAL ENCOUNTER (OUTPATIENT)
Dept: RADIOLOGY | Facility: HOSPITAL | Age: 37
Discharge: HOME | End: 2022-05-10
Attending: OBSTETRICS & GYNECOLOGY
Payer: MEDICAID

## 2022-05-10 VITALS
SYSTOLIC BLOOD PRESSURE: 143 MMHG | BODY MASS INDEX: 32.48 KG/M2 | WEIGHT: 195.19 LBS | DIASTOLIC BLOOD PRESSURE: 82 MMHG

## 2022-05-10 DIAGNOSIS — N94.6 DYSMENORRHEA: ICD-10-CM

## 2022-05-10 DIAGNOSIS — N93.9 ABNORMAL UTERINE BLEEDING (AUB): ICD-10-CM

## 2022-05-10 DIAGNOSIS — Z01.419 ENCOUNTER FOR ANNUAL ROUTINE GYNECOLOGICAL EXAMINATION: Primary | ICD-10-CM

## 2022-05-10 DIAGNOSIS — Z12.4 PAP SMEAR FOR CERVICAL CANCER SCREENING: ICD-10-CM

## 2022-05-10 DIAGNOSIS — Z11.3 SCREENING EXAMINATION FOR STD (SEXUALLY TRANSMITTED DISEASE): ICD-10-CM

## 2022-05-10 PROCEDURE — 99213 OFFICE O/P EST LOW 20 MIN: CPT | Mod: PBBFAC,PN,25 | Performed by: OBSTETRICS & GYNECOLOGY

## 2022-05-10 PROCEDURE — 87491 CHLMYD TRACH DNA AMP PROBE: CPT | Performed by: OBSTETRICS & GYNECOLOGY

## 2022-05-10 PROCEDURE — 1159F PR MEDICATION LIST DOCUMENTED IN MEDICAL RECORD: ICD-10-PCS | Mod: CPTII,,, | Performed by: OBSTETRICS & GYNECOLOGY

## 2022-05-10 PROCEDURE — 3079F DIAST BP 80-89 MM HG: CPT | Mod: CPTII,,, | Performed by: OBSTETRICS & GYNECOLOGY

## 2022-05-10 PROCEDURE — 88175 CYTOPATH C/V AUTO FLUID REDO: CPT | Performed by: OBSTETRICS & GYNECOLOGY

## 2022-05-10 PROCEDURE — 3008F BODY MASS INDEX DOCD: CPT | Mod: CPTII,,, | Performed by: OBSTETRICS & GYNECOLOGY

## 2022-05-10 PROCEDURE — 1159F MED LIST DOCD IN RCRD: CPT | Mod: CPTII,,, | Performed by: OBSTETRICS & GYNECOLOGY

## 2022-05-10 PROCEDURE — 87624 HPV HI-RISK TYP POOLED RSLT: CPT | Performed by: OBSTETRICS & GYNECOLOGY

## 2022-05-10 PROCEDURE — 3077F PR MOST RECENT SYSTOLIC BLOOD PRESSURE >= 140 MM HG: ICD-10-PCS | Mod: CPTII,,, | Performed by: OBSTETRICS & GYNECOLOGY

## 2022-05-10 PROCEDURE — 3077F SYST BP >= 140 MM HG: CPT | Mod: CPTII,,, | Performed by: OBSTETRICS & GYNECOLOGY

## 2022-05-10 PROCEDURE — 3008F PR BODY MASS INDEX (BMI) DOCUMENTED: ICD-10-PCS | Mod: CPTII,,, | Performed by: OBSTETRICS & GYNECOLOGY

## 2022-05-10 PROCEDURE — 99395 PR PREVENTIVE VISIT,EST,18-39: ICD-10-PCS | Mod: S$PBB,,, | Performed by: OBSTETRICS & GYNECOLOGY

## 2022-05-10 PROCEDURE — 76830 TRANSVAGINAL US NON-OB: CPT | Mod: TC,PO

## 2022-05-10 PROCEDURE — 87481 CANDIDA DNA AMP PROBE: CPT | Mod: 59 | Performed by: OBSTETRICS & GYNECOLOGY

## 2022-05-10 PROCEDURE — 99999 PR PBB SHADOW E&M-EST. PATIENT-LVL III: ICD-10-PCS | Mod: PBBFAC,,, | Performed by: OBSTETRICS & GYNECOLOGY

## 2022-05-10 PROCEDURE — 99999 PR PBB SHADOW E&M-EST. PATIENT-LVL III: CPT | Mod: PBBFAC,,, | Performed by: OBSTETRICS & GYNECOLOGY

## 2022-05-10 PROCEDURE — 99395 PREV VISIT EST AGE 18-39: CPT | Mod: S$PBB,,, | Performed by: OBSTETRICS & GYNECOLOGY

## 2022-05-10 PROCEDURE — 3079F PR MOST RECENT DIASTOLIC BLOOD PRESSURE 80-89 MM HG: ICD-10-PCS | Mod: CPTII,,, | Performed by: OBSTETRICS & GYNECOLOGY

## 2022-05-10 PROCEDURE — 87591 N.GONORRHOEAE DNA AMP PROB: CPT | Mod: 59 | Performed by: OBSTETRICS & GYNECOLOGY

## 2022-05-10 NOTE — PROGRESS NOTES
Chief Complaint   Patient presents with    Well Woman       HISTORY OF PRESENT ILLNESS:   Priya Conrad is a 36 y.o. female  With HTN, migraines and h/o  and abdominoplasty who presents for well woman exam.  Patient's last menstrual period was 2022 (approximate)..  She has complains of very heavy cycles, reports regular in timing and last 7 days (change from 4 over the past few years) but has to use depends on first 3 days or will soak through everything. Has been heavy for last 10 years since BTL but worsening over past 5 years. Tried ocps years ago with no help. She also reports more pre-menstrual symptoms with worsening cramping and migraines about 2 weeks before.   Cycles are regular. Desires STD testing. Still having significant constipation.      Past Medical History:   Diagnosis Date    Anxiety     Heart murmur     Hypertension     Migraine    LIGA  Past Surgical History:   Procedure Laterality Date     SECTION      COLONOSCOPY  10/20/2021    COLONOSCOPY N/A 10/20/2021    Procedure: COLONOSCOPY 2 day golytely;  Surgeon: Zheng Woodruff MD;  Location: [facility];  Service: Endoscopy;  Laterality: N/A;    ESOPHAGOGASTRODUODENOSCOPY N/A 10/20/2021    Procedure: EGD (ESOPHAGOGASTRODUODENOSCOPY);  Surgeon: Zheng Woodruff MD;  Location: [facility];  Service: Endoscopy;  Laterality: N/A;    TUBAL LIGATION      Tummy Tuck      UPPER GASTROINTESTINAL ENDOSCOPY  10/20/2021   TION      Tummy Tuck          Socioeconomic History    Marital status:     Number of children: 3   Occupational History    Occupation: medical assistant     Comment: [employer]   Tobacco Use    Smoking status: Never Smoker    Smokeless tobacco: Never Used   Substance and Sexual Activity    Alcohol use: Yes     Comment: socially    Drug use: No   Social History Narrative    , 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 Denies PID.  Benign History:Denies uterine fibroids. Denies ovarian cysts. Denies endometriosis Denies other conditions.  Cancer History: Denies cervical cancer. Denies uterine cancer or hyperplasia. Denies ovarian cancer. Denies vulvar cancer or pre-cancer. Denies vaginal cancer or pre-cancer. Denies breast cancer. Denies colon cancer.  Cycle: , heavy and painful   Had BTL   2 relatives on dad side with breast cancer, aunt, postmenopausal, then a cousin in 40s     ROS:  GENERAL: Denies weight gain or weight loss. Feeling well overall.   SKIN: Denies rash or lesions.   HEAD: Denies headache.   NODES: Denies enlarged lymph nodes.   CHEST: Denies shortness of breath.   ABDOMEN: No abdominal pain, constipation, diarrhea, nausea, vomiting or rectal bleeding.   URINARY: No frequency, dysuria, hematuria, or burning on urination.  REPRODUCTIVE: See HPI.   BREASTS: The patient denies pain, lumps, or nipple discharge.       BP (!) 143/82   Wt 88.5 kg (195 lb 3.2 oz)   LMP 2022 (Approximate)   BMI 32.48 kg/m²     APPEARANCE: Well nourished, well developed, in no acute distress.  NECK: Neck symmetric   ABDOMEN: Soft. No tenderness or masses. No hernias. No hepatosplenomegaly. scar c/w well healed abdominoplasty  BREASTS: Symmetrical, no skin changes or visible lesions. No palpable masses, nipple discharge or adenopathy bilaterally.  PELVIC:   VULVA: No lesions. Normal female genitalia.  URETHRAL  MEATUS: Normal size and location, no lesions, no prolapse.  URETHRA: No masses, tenderness, prolapse or scarring.  VAGINA: Moist and well rugated, no discharge, no significant cystocele or rectocele.  CERVIX: No lesions and discharge.  UTERUS: 8-10 week size, regular shape, mobile, non-tender, bladder base nontender.  ADNEXA: No masses or tenderness.      1. Encounter for annual routine gynecological examination    2. Abnormal uterine bleeding (AUB)    3. Dysmenorrhea    4. Pap smear for cervical cancer screening    5. Screening examination for STD (sexually transmitted disease)        Plan:  Routine gyn s/p normal breast exam. Pap With HPV cotesting ordered, if normal then ok to space out . STD testing: GC/CT ordered,   2.  AUB  will need full work up to evaluate causes.  Labs ordered:, CBC, and TSH  Imaging ordered:  Pelvic ultrasound  Treatment medical vs surgical discussed. Likely will encourage medications first with extensive surgical history.   3. F/u with GI for constipation, likely contributing to pain

## 2022-05-11 LAB
C TRACH DNA SPEC QL NAA+PROBE: NOT DETECTED
N GONORRHOEA DNA SPEC QL NAA+PROBE: NOT DETECTED

## 2022-05-13 ENCOUNTER — PATIENT MESSAGE (OUTPATIENT)
Dept: OBSTETRICS AND GYNECOLOGY | Facility: CLINIC | Age: 37
End: 2022-05-13
Payer: MEDICAID

## 2022-05-13 ENCOUNTER — PATIENT MESSAGE (OUTPATIENT)
Dept: OBSTETRICS AND GYNECOLOGY | Facility: HOSPITAL | Age: 37
End: 2022-05-13
Payer: MEDICAID

## 2022-05-16 ENCOUNTER — PATIENT MESSAGE (OUTPATIENT)
Dept: OBSTETRICS AND GYNECOLOGY | Facility: CLINIC | Age: 37
End: 2022-05-16
Payer: MEDICAID

## 2022-05-16 LAB
BACTERIAL VAGINOSIS DNA: POSITIVE
CANDIDA GLABRATA DNA: NEGATIVE
CANDIDA KRUSEI DNA: NEGATIVE
CANDIDA RRNA VAG QL PROBE: NEGATIVE
CLINICAL INFO: NORMAL
CYTO CVX: NORMAL
CYTOLOGIST CVX/VAG CYTO: NORMAL
CYTOLOGIST CVX/VAG CYTO: NORMAL
CYTOLOGY CMNT CVX/VAG CYTO-IMP: NORMAL
CYTOLOGY PAP THIN PREP EXPLANATION: NORMAL
DATE OF PREVIOUS PAP: YES
DATE PREVIOUS BX: YES
GEN CATEG CVX/VAG CYTO-IMP: NORMAL
HPV I/H RISK 4 DNA CVX QL NAA+PROBE: NOT DETECTED
LMP START DATE: NORMAL
MICROORGANISM CVX/VAG CYTO: NORMAL
PATHOLOGIST CVX/VAG CYTO: NORMAL
SERVICE CMNT-IMP: NORMAL
SPECIMEN SOURCE CVX/VAG CYTO: NORMAL
STAT OF ADQ CVX/VAG CYTO-IMP: NORMAL
T VAGINALIS RRNA GENITAL QL PROBE: NEGATIVE

## 2022-05-17 ENCOUNTER — OFFICE VISIT (OUTPATIENT)
Dept: OBSTETRICS AND GYNECOLOGY | Facility: CLINIC | Age: 37
End: 2022-05-17
Payer: MEDICAID

## 2022-05-17 VITALS
DIASTOLIC BLOOD PRESSURE: 87 MMHG | SYSTOLIC BLOOD PRESSURE: 125 MMHG | BODY MASS INDEX: 32.35 KG/M2 | WEIGHT: 194.38 LBS

## 2022-05-17 DIAGNOSIS — N94.6 DYSMENORRHEA: ICD-10-CM

## 2022-05-17 DIAGNOSIS — N93.9 ABNORMAL UTERINE BLEEDING (AUB): Primary | ICD-10-CM

## 2022-05-17 DIAGNOSIS — N76.0 RECURRENT VAGINITIS: ICD-10-CM

## 2022-05-17 PROCEDURE — 3079F DIAST BP 80-89 MM HG: CPT | Mod: CPTII,,, | Performed by: OBSTETRICS & GYNECOLOGY

## 2022-05-17 PROCEDURE — 99213 OFFICE O/P EST LOW 20 MIN: CPT | Mod: PBBFAC,PN | Performed by: OBSTETRICS & GYNECOLOGY

## 2022-05-17 PROCEDURE — 3074F PR MOST RECENT SYSTOLIC BLOOD PRESSURE < 130 MM HG: ICD-10-PCS | Mod: CPTII,,, | Performed by: OBSTETRICS & GYNECOLOGY

## 2022-05-17 PROCEDURE — 1160F PR REVIEW ALL MEDS BY PRESCRIBER/CLIN PHARMACIST DOCUMENTED: ICD-10-PCS | Mod: CPTII,,, | Performed by: OBSTETRICS & GYNECOLOGY

## 2022-05-17 PROCEDURE — 1159F MED LIST DOCD IN RCRD: CPT | Mod: CPTII,,, | Performed by: OBSTETRICS & GYNECOLOGY

## 2022-05-17 PROCEDURE — 3008F PR BODY MASS INDEX (BMI) DOCUMENTED: ICD-10-PCS | Mod: CPTII,,, | Performed by: OBSTETRICS & GYNECOLOGY

## 2022-05-17 PROCEDURE — 99213 PR OFFICE/OUTPT VISIT, EST, LEVL III, 20-29 MIN: ICD-10-PCS | Mod: S$PBB,,, | Performed by: OBSTETRICS & GYNECOLOGY

## 2022-05-17 PROCEDURE — 1160F RVW MEDS BY RX/DR IN RCRD: CPT | Mod: CPTII,,, | Performed by: OBSTETRICS & GYNECOLOGY

## 2022-05-17 PROCEDURE — 3079F PR MOST RECENT DIASTOLIC BLOOD PRESSURE 80-89 MM HG: ICD-10-PCS | Mod: CPTII,,, | Performed by: OBSTETRICS & GYNECOLOGY

## 2022-05-17 PROCEDURE — 3008F BODY MASS INDEX DOCD: CPT | Mod: CPTII,,, | Performed by: OBSTETRICS & GYNECOLOGY

## 2022-05-17 PROCEDURE — 1159F PR MEDICATION LIST DOCUMENTED IN MEDICAL RECORD: ICD-10-PCS | Mod: CPTII,,, | Performed by: OBSTETRICS & GYNECOLOGY

## 2022-05-17 PROCEDURE — 99213 OFFICE O/P EST LOW 20 MIN: CPT | Mod: S$PBB,,, | Performed by: OBSTETRICS & GYNECOLOGY

## 2022-05-17 PROCEDURE — 99999 PR PBB SHADOW E&M-EST. PATIENT-LVL III: ICD-10-PCS | Mod: PBBFAC,,, | Performed by: OBSTETRICS & GYNECOLOGY

## 2022-05-17 PROCEDURE — 3074F SYST BP LT 130 MM HG: CPT | Mod: CPTII,,, | Performed by: OBSTETRICS & GYNECOLOGY

## 2022-05-17 PROCEDURE — 99999 PR PBB SHADOW E&M-EST. PATIENT-LVL III: CPT | Mod: PBBFAC,,, | Performed by: OBSTETRICS & GYNECOLOGY

## 2022-05-17 RX ORDER — NORELGESTROMIN AND ETHINYL ESTRADIOL 35; 150 UG/MG; UG/MG
1 PATCH TRANSDERMAL WEEKLY
Qty: 4 PATCH | Refills: 11 | Status: SHIPPED | OUTPATIENT
Start: 2022-05-17 | End: 2022-11-28

## 2022-05-17 RX ORDER — GABAPENTIN 300 MG/1
CAPSULE ORAL
Status: ON HOLD | COMMUNITY
Start: 2022-05-13 | End: 2023-07-11 | Stop reason: HOSPADM

## 2022-05-17 NOTE — PROGRESS NOTES
Chief Complaint   Patient presents with    Well Woman       HISTORY OF PRESENT ILLNESS:   Priya Conrad is a 36 y.o. female  With HTN, migraines and h/o  and abdominoplasty who presents for f/u .  Patient's last menstrual period was 2022 (approximate)..  She has complains of very heavy cycles, reports regular in timing and last 7 days (change from 4 over the past few years) but has to use depends on first 3 days or will soak through everything. Has been heavy for last 10 years since BTL but worsening over past 5 years. Tried ocps years ago with no help. She also reports more pre-menstrual symptoms with worsening cramping and migraines about 2 weeks before.   Cycles are regular. She also is concerned about recurrent BV, seems to come after every cycle.  Still having significnt constipation.      Past Medical History:   Diagnosis Date    Anxiety     Heart murmur     Hypertension     Migraine    LIGA  Past Surgical History:   Procedure Laterality Date     SECTION      COLONOSCOPY  10/20/2021    COLONOSCOPY N/A 10/20/2021    Procedure: COLONOSCOPY 2 day golytely;  Surgeon: Zheng Woodruff MD;  Location: [facility];  Service: Endoscopy;  Laterality: N/A;    ESOPHAGOGASTRODUODENOSCOPY N/A 10/20/2021    Procedure: EGD (ESOPHAGOGASTRODUODENOSCOPY);  Surgeon: Zheng Woodruff MD;  Location: 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 Denies PID.  Benign History: has uterine fibroids. Denies ovarian cysts. Denies endometriosis Denies other conditions.  Cancer History: Denies cervical cancer. Denies uterine cancer or hyperplasia. Denies ovarian cancer. Denies vulvar cancer or pre-cancer. Denies vaginal cancer or pre-cancer. Denies breast cancer. Denies colon cancer.  Cycle: , heavy and painful   Had BTL   2 relatives on dad side with breast cancer, aunt, postmenopausal, then a cousin in 40s     ROS:  GENERAL: Denies weight gain or weight loss. Feeling well overall.   SKIN: Denies rash or lesions.   HEAD: Denies headache.   NODES: Denies enlarged lymph nodes.   CHEST: Denies shortness of breath.   ABDOMEN: No abdominal pain, constipation, diarrhea, nausea, vomiting or rectal bleeding.   URINARY: No frequency, dysuria, hematuria, or burning on urination.  REPRODUCTIVE: See HPI.   BREASTS: The patient denies pain, lumps, or nipple discharge.       BP (!) 143/82   Wt 88.5 kg (195 lb 3.2 oz)   LMP 2022 (Approximate)   BMI 32.48 kg/m²     APPEARANCE: Well nourished, well developed, in no acute distress.  NECK: Neck symmetric             2. Abnormal uterine bleeding (AUB)    3. Dysmenorrhea    1.  Recurrent vaginitis            Plan:  1. Discussed likely worse bleeding 2/2 fibroid, discussed treatment options including medications )birth control, lysteda, oriahnn) vs surgery but I would  recommend at least 2 mediations failures before considering surgery. She would like to try the birth control patch, BP well controlled. Will monitor  And RTC in 3 months for check up   2. F/u with GI for constipation, likely contributing to pain   3. Discussed vaginitis prevention and likely having BV that isn't resolving completely, can do long term treatment discussed either boric acid vs metrogel and would prefer boric acid.     Face to Face time with patient: 20 minutes of total time spent on the encounter, which includes face to face time and non-face to face time preparing to see the patient (eg, review of tests), Obtaining and/or reviewing separately obtained history, Documenting clinical information in the electronic or other health record, Independently interpreting results (not separately reported) and communicating results to the patient/family/caregiver, or Care coordination (not separately reported).

## 2022-06-13 ENCOUNTER — OUTSIDE PLACE OF SERVICE (OUTPATIENT)
Dept: CARDIOLOGY | Facility: CLINIC | Age: 37
End: 2022-06-13
Payer: MEDICAID

## 2022-06-13 ENCOUNTER — PATIENT MESSAGE (OUTPATIENT)
Dept: OBSTETRICS AND GYNECOLOGY | Facility: CLINIC | Age: 37
End: 2022-06-13
Payer: MEDICAID

## 2022-06-13 PROCEDURE — 93010 PR ELECTROCARDIOGRAM REPORT: ICD-10-PCS | Mod: ,,, | Performed by: INTERNAL MEDICINE

## 2022-06-13 PROCEDURE — 93010 ELECTROCARDIOGRAM REPORT: CPT | Mod: ,,, | Performed by: INTERNAL MEDICINE

## 2022-06-17 ENCOUNTER — PATIENT MESSAGE (OUTPATIENT)
Dept: GASTROENTEROLOGY | Facility: CLINIC | Age: 37
End: 2022-06-17
Payer: MEDICAID

## 2022-07-05 ENCOUNTER — PATIENT MESSAGE (OUTPATIENT)
Dept: ADMINISTRATIVE | Facility: OTHER | Age: 37
End: 2022-07-05
Payer: MEDICAID

## 2022-07-11 ENCOUNTER — OCCUPATIONAL HEALTH (OUTPATIENT)
Dept: URGENT CARE | Facility: CLINIC | Age: 37
End: 2022-07-11

## 2022-07-11 DIAGNOSIS — Z13.9 ENCOUNTER FOR SCREENING: ICD-10-CM

## 2022-07-11 DIAGNOSIS — Z02.1 ENCOUNTER FOR PRE-EMPLOYMENT DRUG TESTING: Primary | ICD-10-CM

## 2022-07-11 LAB
CTP QC/QA: YES
POC 5 PANEL DRUG SCREEN: NEGATIVE

## 2022-07-11 PROCEDURE — 80305 POCT RAPID DRUG SCREEN 5 PANEL: ICD-10-PCS | Mod: S$GLB,,, | Performed by: FAMILY MEDICINE

## 2022-07-11 PROCEDURE — 80305 DRUG TEST PRSMV DIR OPT OBS: CPT | Mod: S$GLB,,, | Performed by: FAMILY MEDICINE

## 2022-07-27 ENCOUNTER — HOSPITAL ENCOUNTER (EMERGENCY)
Facility: HOSPITAL | Age: 37
Discharge: HOME | End: 2022-07-27
Attending: EMERGENCY MEDICINE
Payer: MEDICAID

## 2022-07-27 VITALS
BODY MASS INDEX: 32.82 KG/M2 | RESPIRATION RATE: 19 BRPM | OXYGEN SATURATION: 99 % | HEIGHT: 65 IN | SYSTOLIC BLOOD PRESSURE: 145 MMHG | DIASTOLIC BLOOD PRESSURE: 86 MMHG | TEMPERATURE: 100 F | HEART RATE: 99 BPM | WEIGHT: 197 LBS

## 2022-07-27 DIAGNOSIS — S16.1XXA STRAIN OF NECK MUSCLE, INITIAL ENCOUNTER: Primary | ICD-10-CM

## 2022-07-27 PROCEDURE — 99284 EMERGENCY DEPT VISIT MOD MDM: CPT | Mod: ER

## 2022-07-27 RX ORDER — AMLODIPINE BESYLATE 5 MG/1
5 TABLET ORAL EVERY MORNING
COMMUNITY

## 2022-07-27 RX ORDER — METHOCARBAMOL 750 MG/1
1500 TABLET, FILM COATED ORAL 3 TIMES DAILY
Qty: 30 TABLET | Refills: 0 | Status: SHIPPED | OUTPATIENT
Start: 2022-07-27 | End: 2022-08-01

## 2022-07-27 RX ORDER — NAPROXEN 500 MG/1
500 TABLET ORAL 2 TIMES DAILY WITH MEALS
Qty: 14 TABLET | Refills: 0 | Status: SHIPPED | OUTPATIENT
Start: 2022-07-27 | End: 2022-11-28 | Stop reason: ALTCHOICE
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
## 2022-07-27 NOTE — Clinical Note
"Priya Arevaloita" [place] was seen and treated in our emergency department on 7/27/2022.  She may return to work on 07/29/2022.       If you have any questions or concerns, please don't hesitate to call.      Amber Tavera PA-C"

## 2022-08-01 ENCOUNTER — HOSPITAL ENCOUNTER (OUTPATIENT)
Dept: RADIOLOGY | Facility: HOSPITAL | Age: 37
Discharge: HOME | End: 2022-08-01
Attending: FAMILY MEDICINE
Payer: MEDICAID

## 2022-08-01 DIAGNOSIS — M54.9 DORSALGIA: ICD-10-CM

## 2022-08-01 DIAGNOSIS — M54.2 CERVICALGIA: Primary | ICD-10-CM

## 2022-08-01 DIAGNOSIS — M54.2 CERVICALGIA: ICD-10-CM

## 2022-08-01 PROCEDURE — 72040 X-RAY EXAM NECK SPINE 2-3 VW: CPT | Mod: TC,FY,PO

## 2022-08-01 PROCEDURE — 72070 X-RAY EXAM THORAC SPINE 2VWS: CPT | Mod: TC,FY,PO

## 2022-09-13 ENCOUNTER — OFFICE VISIT (OUTPATIENT)
Dept: GASTROENTEROLOGY | Facility: CLINIC | Age: 37
End: 2022-09-13
Payer: MEDICAID

## 2022-09-13 VITALS — BODY MASS INDEX: 33.43 KG/M2 | HEIGHT: 65 IN | WEIGHT: 200.63 LBS

## 2022-09-13 DIAGNOSIS — N81.6 RECTOCELE: ICD-10-CM

## 2022-09-13 DIAGNOSIS — M62.89 PELVIC FLOOR DYSFUNCTION IN FEMALE: ICD-10-CM

## 2022-09-13 DIAGNOSIS — K59.04 CHRONIC IDIOPATHIC CONSTIPATION: Primary | ICD-10-CM

## 2022-09-13 PROCEDURE — 99999 PR PBB SHADOW E&M-EST. PATIENT-LVL III: CPT | Mod: PBBFAC,,, | Performed by: INTERNAL MEDICINE

## 2022-09-13 PROCEDURE — 3008F BODY MASS INDEX DOCD: CPT | Mod: CPTII,,, | Performed by: INTERNAL MEDICINE

## 2022-09-13 PROCEDURE — 99999 PR PBB SHADOW E&M-EST. PATIENT-LVL III: ICD-10-PCS | Mod: PBBFAC,,, | Performed by: INTERNAL MEDICINE

## 2022-09-13 PROCEDURE — 99214 OFFICE O/P EST MOD 30 MIN: CPT | Mod: S$PBB,,, | Performed by: INTERNAL MEDICINE

## 2022-09-13 PROCEDURE — 99214 PR OFFICE/OUTPT VISIT, EST, LEVL IV, 30-39 MIN: ICD-10-PCS | Mod: S$PBB,,, | Performed by: INTERNAL MEDICINE

## 2022-09-13 PROCEDURE — 1159F MED LIST DOCD IN RCRD: CPT | Mod: CPTII,,, | Performed by: INTERNAL MEDICINE

## 2022-09-13 PROCEDURE — 3008F PR BODY MASS INDEX (BMI) DOCUMENTED: ICD-10-PCS | Mod: CPTII,,, | Performed by: INTERNAL MEDICINE

## 2022-09-13 PROCEDURE — 99213 OFFICE O/P EST LOW 20 MIN: CPT | Mod: PBBFAC,PN | Performed by: INTERNAL MEDICINE

## 2022-09-13 PROCEDURE — 1159F PR MEDICATION LIST DOCUMENTED IN MEDICAL RECORD: ICD-10-PCS | Mod: CPTII,,, | Performed by: INTERNAL MEDICINE

## 2022-09-13 RX ORDER — PRUCALOPRIDE 2 MG/1
2 TABLET, FILM COATED ORAL DAILY
Qty: 30 TABLET | Refills: 6 | Status: SHIPPED | OUTPATIENT
Start: 2022-09-13 | End: 2024-01-29

## 2022-09-13 NOTE — PROGRESS NOTES
GASTROENTEROLOGY CLINIC NOTE    Reason for visit: The primary encounter diagnosis was Chronic idiopathic constipation. Diagnoses of Rectocele and Pelvic floor dysfunction in female were also pertinent to this visit.  Referring provider/PCP: Luz Masters MD    HPI:  Priya Conrad is a 37 y.o. female here today for abdominal symptoms, new patient.  Previously seen by dr. Cummings at [facility].     Follow up  Trying to increase water and exercise to help. Having BM every 1 -2 weeks. Still with straining. Didn't get medication trulance approved last time.   No vomiting. Didn't follow up with CRS referral  Never been to pelvic PT.   No radiating symptoms  Feels sluggish when doesn't have BM.  Currently not taking anything on regular basis.     ============================  Initial clinic visit 8/2021  gerd - burning and itching with swallowing. protonix stopped working. Constant throat clearing. + anxiety. Has good appetite. Drinks lots of water. Tries to eat greens. Assoc Nausea  - all the time with bloating and keeps gas. Worsening in last month. No vomiting but feels like she needs to.   Constipation - ongoing for 1 year, can go > 1 week without a BM. No urgency. Tried multiple things. See below. Has hemorrhoids at times.   trulance was effective, then stopped working ; nothing else has seemed to help  linzess 145   amitiza  miralax  Lactulose  Mag citrate    No family hx of CRC in 1st degree.   Aunt - CRC      Prior Endoscopy:  EGD: / Colon:   10 / 2021 with me  Impression:            - Normal esophagus.                          - Normal stomach. Biopsied.                          - A few gastric polyps. Biopsied. Suspected fundic                          gland polyp, benign.                          - Normal examined duodenum.     Redundant sigmoid, otherwise normal  Repeat 10 years.    Defogram - small rectocele,  residual contrast noted    (Portions of this note were dictated using voice recognition software  and may contain dictation related errors in spelling/grammar/syntax not found on text review)    Review of Systems   Constitutional:  Negative for fever and malaise/fatigue.   Respiratory:  Negative for cough and shortness of breath.    Cardiovascular:  Negative for chest pain and palpitations.   Gastrointestinal:  Positive for abdominal pain, constipation and nausea. Negative for blood in stool and vomiting.   Neurological:  Negative for dizziness and headaches.     Past Medical History: has a past medical history of Anxiety, Heart murmur, Hypertension, and Migraine.    Past Surgical History: has a past surgical history that includes Tummy Tuck;  section; Tubal ligation; Upper gastrointestinal endoscopy (10/20/2021); Colonoscopy (10/20/2021); Esophagogastroduodenoscopy (N/A, 10/20/2021); and Colonoscopy (N/A, 10/20/2021).    Family History:family history includes Breast cancer in her paternal aunt; Heart failure in her maternal grandfather and maternal grandmother; Hypertension in her mother.    Allergies: Review of patient's allergies indicates:  No Known Allergies    Social History: reports that she has never smoked. She has never used smokeless tobacco. She reports that she does not currently use alcohol. She reports that she does not use drugs.    Home medications:   Current Outpatient Medications on File Prior to Visit   Medication Sig Dispense Refill    amLODIPine (NORVASC) 5 MG tablet Take 5 mg by mouth once daily.      atogepant (QULIPTA) 60 mg Tab Take 60 mg by mouth once daily. 30 tablet 5    butalbital-acetaminophen-caffeine -40 mg (FIORICET, ESGIC) -40 mg per tablet Take 1 tablet by mouth every 4 (four) hours as needed for Pain or Headaches.      gabapentin (NEURONTIN) 100 MG capsule       gabapentin (NEURONTIN) 300 MG capsule       naproxen (NAPROSYN) 500 MG tablet Take 1 tablet (500 mg total) by mouth 2 (two) times daily with meals. 14 tablet 0    norelgestromin-ethinyl estradiol  "(ORTHO EVRA) 150-35 mcg/24 hr Place 1 patch onto the skin once a week. Wear one patch for a week for 3 weeks then 1 week patch-free 4 patch 11    rimegepant (NURTEC) 75 mg odt Take 75 mg by mouth once as needed for Migraine.      plecanatide (TRULANCE) 3 mg Tab Take 3 mg by mouth.       No current facility-administered medications on file prior to visit.       Vital signs:  Ht 5' 5" (1.651 m)   Wt 91 kg (200 lb 9.9 oz)   BMI 33.38 kg/m²     Physical Exam  Vitals reviewed.   Constitutional:       General: She is not in acute distress.  HENT:      Head: Normocephalic and atraumatic.   Eyes:      General: No scleral icterus.     Conjunctiva/sclera: Conjunctivae normal.   Skin:     General: Skin is warm.      Coloration: Skin is not pale.      Findings: No rash.   Neurological:      Mental Status: She is oriented to person, place, and time.      Gait: Gait normal.   Psychiatric:         Mood and Affect: Mood normal.         Behavior: Behavior normal.       I have reviewed prior labs, imaging, notes from last month    TSH and calcium WNL     Assessment:  1. Chronic idiopathic constipation    2. Rectocele    3. Pelvic floor dysfunction in female      Severe constipation  Small rectocele with pelvic dysfunction likely contributing    Plan:  Orders Placed This Encounter    Ambulatory referral/consult to Physical/Occupational Therapy    prucalopride (MOTEGRITY) 2 mg Tab     Will see if we can get motegrity approval given she has failed everything else    Send to pelvic floor PT    RTC prn    Zheng Woodruff MD  [facility]              "

## 2022-09-15 ENCOUNTER — TELEPHONE (OUTPATIENT)
Dept: PHARMACY | Facility: CLINIC | Age: 37
End: 2022-09-15
Payer: MEDICAID

## 2022-10-26 PROBLEM — M62.89 PELVIC FLOOR DYSFUNCTION: Status: ACTIVE | Noted: 2022-10-26

## 2022-11-28 ENCOUNTER — HOSPITAL ENCOUNTER (EMERGENCY)
Facility: HOSPITAL | Age: 37
Discharge: HOME | End: 2022-11-28
Attending: EMERGENCY MEDICINE
Payer: MEDICAID

## 2022-11-28 VITALS
TEMPERATURE: 98 F | HEART RATE: 97 BPM | SYSTOLIC BLOOD PRESSURE: 143 MMHG | WEIGHT: 190 LBS | DIASTOLIC BLOOD PRESSURE: 89 MMHG | RESPIRATION RATE: 18 BRPM | HEIGHT: 65 IN | OXYGEN SATURATION: 99 % | BODY MASS INDEX: 31.65 KG/M2

## 2022-11-28 DIAGNOSIS — S80.02XA CONTUSION OF LEFT KNEE, INITIAL ENCOUNTER: ICD-10-CM
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
## 2022-11-28 DIAGNOSIS — S93.422A SPRAIN OF DELTOID LIGAMENT OF LEFT ANKLE, INITIAL ENCOUNTER: Primary | ICD-10-CM

## 2022-11-28 PROCEDURE — 99283 EMERGENCY DEPT VISIT LOW MDM: CPT | Mod: ER

## 2022-11-28 PROCEDURE — 25000003 PHARM REV CODE 250: Mod: ER | Performed by: EMERGENCY MEDICINE

## 2022-11-28 RX ORDER — IBUPROFEN 400 MG/1
800 TABLET ORAL
Status: COMPLETED | OUTPATIENT
Start: 2022-11-28 | End: 2022-11-28

## 2022-11-28 RX ORDER — MELOXICAM 7.5 MG/1
7.5 TABLET ORAL DAILY
Qty: 7 TABLET | Refills: 0 | Status: SHIPPED | OUTPATIENT
Start: 2022-11-28 | End: 2022-12-05

## 2022-11-28 RX ADMIN — IBUPROFEN 800 MG: 400 TABLET ORAL at 03:11

## 2022-11-29 NOTE — ED PROVIDER NOTES
Encounter Date: 2022       History     Chief Complaint   Patient presents with    Fall     Pt reports fall after missing last step when exiting an 18 collier yesterday at approx 1 pm. Pain reported to left ankle. Ibuprofen last taken at approx 8pm and antiinflammatory medications taken at approx 5 pm. Pt hit posterior knee on step when falling.     Patient currently presents following a near ground level fall.  Patient describes that she missed the last step when exitin an 18 collier yesterday.  She notes that she fell awkwardly on the left ankle and knee.  Ibuprofen last taken around 8:00 p.m..  Patient remains ambulatory on the extremity though with discomfort.    Review of patient's allergies indicates:  No Known Allergies  Past Medical History:   Diagnosis Date    Anxiety     Heart murmur     Hypertension     Migraine      Past Surgical History:   Procedure Laterality Date     SECTION      COLONOSCOPY  10/20/2021    COLONOSCOPY N/A 10/20/2021    Procedure: COLONOSCOPY 2 day golytely;  Surgeon: Zheng Woodruff MD;  Location: [facility];  Service: Endoscopy;  Laterality: N/A;    ESOPHAGOGASTRODUODENOSCOPY N/A 10/20/2021    Procedure: EGD (ESOPHAGOGASTRODUODENOSCOPY);  Surgeon: Zheng Woodruff MD;  Location: [facility];  Service: Endoscopy;  Laterality: N/A;    TUBAL LIGATION      Tummy Tuck      UPPER GASTROINTESTINAL ENDOSCOPY  10/20/2021     Family History   Problem Relation Age of Onset    Hypertension Mother     Breast cancer Paternal Aunt     Heart failure Maternal Grandmother     Heart failure Maternal Grandfather      Social History     Tobacco Use    Smoking status: Never    Smokeless tobacco: Never   Substance Use Topics    Alcohol use: Not Currently     Comment: socially    Drug use: No     Review of Systems   Constitutional:  Negative for chills and fever.   HENT:  Negative for congestion and rhinorrhea.    Respiratory:  Negative for cough and shortness of breath.    Cardiovascular:   Negative for chest pain and palpitations.   Gastrointestinal:  Negative for abdominal pain, diarrhea and vomiting.   Genitourinary:  Negative for dysuria.   Skin:  Negative for color change and rash.   Neurological:  Negative for dizziness and light-headedness.   Hematological:  Negative for adenopathy. Does not bruise/bleed easily.     Physical Exam     Initial Vitals [11/28/22 0055]   BP Pulse Resp Temp SpO2   (!) 141/99 106 19 98 °F (36.7 °C) 98 %      MAP       --         Physical Exam    Nursing note and vitals reviewed.  Constitutional: She appears well-developed and well-nourished. She is not diaphoretic. No distress.   HENT:   Head: Normocephalic and atraumatic.   Cardiovascular:  Normal rate, regular rhythm, normal heart sounds and intact distal pulses.           Pulmonary/Chest: Breath sounds normal. No respiratory distress.   Musculoskeletal:      Left knee: No swelling, deformity, erythema, ecchymosis or bony tenderness. Normal range of motion. Tenderness present. Normal alignment and normal patellar mobility. Normal pulse.      Left ankle: Swelling present. No deformity or ecchymosis. Tenderness present. Normal range of motion. Normal pulse.      Left Achilles Tendon: Normal.     Neurological: She is alert and oriented to person, place, and time.   Skin: Skin is warm and dry.       ED Course   Procedures  Labs Reviewed - No data to display       Imaging Results              X-Ray Ankle Complete Left (Final result)  Result time 11/28/22 07:46:42      Final result by Jovani Bill MD (11/28/22 07:46:42)                   Impression:      No acute fracture or dislocation.      Electronically signed by: Jovani Bill MD  Date:    11/28/2022  Time:    07:46               Narrative:    EXAMINATION:  XR ANKLE COMPLETE 3 VIEW LEFT    CLINICAL HISTORY:  XR ANKLE COMPLETE 3 VIEW LEFTUnspecified fall, initial encounter    COMPARISON:  None    FINDINGS:  Three views of the left ankle were obtained.    No  evidence of acute fracture or dislocation.  Bony mineralization is normal.  Soft tissues are unremarkable.                                       X-Ray Knee 1 or 2 View Left (Final result)  Result time 11/28/22 08:07:33      Final result by KARON Rodríguez Sr., MD (11/28/22 08:07:33)                   Impression:      Normal study.      Electronically signed by: Jim Rodríguez MD  Date:    11/28/2022  Time:    08:07               Narrative:    EXAMINATION:  XR KNEE 1 OR 2 VIEW LEFT    CLINICAL HISTORY:  pain;    COMPARISON:  None    FINDINGS:  There is no fracture. There is no dislocation.                                       Medications   ibuprofen tablet 800 mg (800 mg Oral Given 11/28/22 0308)     Medical Decision Making:   Clinical Tests:   Radiological Study: Ordered and Reviewed  ED Management:  All findings were reviewed with the patient/family in detail.  I see no indication of an emergent process beyond that addressed during our encounter but have duly counseled the patient/family regarding the need for prompt follow-up as well as the indications that should prompt immediate return to the emergency room should new or worrisome developments occur.  The patient has additionally been provided with printed information regarding diagnosis as well as instructions regarding follow up and any medications intended to manage the patient's aforementioned conditions.  The patient/family communicates understanding of all this information and all remaining questions and concerns were addressed at this time.                                Clinical Impression:   Final diagnoses:  [W19.XXXA] Fall  [S93.422A] Sprain of deltoid ligament of left ankle, initial encounter (Primary)  [S80.02XA] Contusion of left knee, initial encounter        ED Disposition Condition    Discharge Stable          ED Prescriptions       Medication Sig Dispense Start Date End Date Auth. Provider    meloxicam (MOBIC) 7.5 MG tablet Take 1 tablet  (7.5 mg total) by mouth once daily. for 7 days 7 tablet 11/28/2022 12/5/2022 Jona Bello MD          Follow-up Information       Follow up With Specialties Details Why Contact Info    Luz Masters MD Internal Medicine Schedule an appointment as soon as possible for a visit  for reassessment [address] [facility] [address]  [phone]      [facility] - Emergency Dept Emergency Medicine Go to  As needed, If symptoms worsen [address] [facility] [address] [phone]             Jona Bello MD  11/29/22 0026

## 2022-12-13 ENCOUNTER — HOSPITAL ENCOUNTER (EMERGENCY)
Facility: HOSPITAL | Age: 37
Discharge: HOME | End: 2022-12-13
Attending: EMERGENCY MEDICINE
Payer: MEDICAID

## 2022-12-13 VITALS
TEMPERATURE: 99 F | SYSTOLIC BLOOD PRESSURE: 154 MMHG | HEART RATE: 94 BPM | OXYGEN SATURATION: 100 % | WEIGHT: 190 LBS | BODY MASS INDEX: 31.65 KG/M2 | DIASTOLIC BLOOD PRESSURE: 90 MMHG | RESPIRATION RATE: 18 BRPM | HEIGHT: 65 IN

## 2022-12-13 DIAGNOSIS — R51.9 FRONTAL HEADACHE: Primary | ICD-10-CM

## 2022-12-13 PROCEDURE — 63600175 PHARM REV CODE 636 W HCPCS: Mod: ER

## 2022-12-13 PROCEDURE — 25000003 PHARM REV CODE 250: Mod: ER

## 2022-12-13 PROCEDURE — 96374 THER/PROPH/DIAG INJ IV PUSH: CPT | Mod: ER

## 2022-12-13 PROCEDURE — 96361 HYDRATE IV INFUSION ADD-ON: CPT | Mod: ER

## 2022-12-13 PROCEDURE — 99284 EMERGENCY DEPT VISIT MOD MDM: CPT | Mod: 25,ER

## 2022-12-13 RX ORDER — PROCHLORPERAZINE EDISYLATE 5 MG/ML
10 INJECTION INTRAMUSCULAR; INTRAVENOUS
Status: COMPLETED | OUTPATIENT
Start: 2022-12-13 | End: 2022-12-13

## 2022-12-13 RX ORDER — DIPHENHYDRAMINE HCL 12.5MG/5ML
12.5 ELIXIR ORAL
Status: COMPLETED | OUTPATIENT
Start: 2022-12-13 | End: 2022-12-13

## 2022-12-13 RX ADMIN — PROCHLORPERAZINE EDISYLATE 10 MG: 5 INJECTION INTRAMUSCULAR; INTRAVENOUS at 06:12

## 2022-12-13 RX ADMIN — DIPHENHYDRAMINE HYDROCHLORIDE 12.5 MG: 12.5 SOLUTION ORAL at 06:12

## 2022-12-13 RX ADMIN — SODIUM CHLORIDE 100 ML: 9 INJECTION, SOLUTION INTRAVENOUS at 06:12

## 2022-12-13 NOTE — Clinical Note
"Priya Mccall" [place] was seen and treated in our emergency department on 12/13/2022.  She may return to work on 12/14/2022.       If you have any questions or concerns, please don't hesitate to call.      Sonja Corral PA-C"

## 2022-12-14 NOTE — ED PROVIDER NOTES
Encounter Date: 2022       History     Chief Complaint   Patient presents with    Headache     Headaches the last 2 days, blood pressure high for last week, added meds, follows up with PCP tomorrow      Patient is a 37-year-old female who presents to ED with left sided headache onset 2 days ago.  Patient reports taking naproxen at home with no relief.  Patient also complains of high blood pressure.  Her pressure is 154/90 in the ED. Patient has an appointment with her primary care doctor tomorrow.  She denies weakness, vomiting or diarrhea.    A ten point review of systems was completed and is negative except as documented above.  Patient denies any other acute medical complaint.    The patients available PMH, PSH, Social History, medications, allergies, and triage vital signs were reviewed just prior to their medical evaluation.      The history is provided by the patient.   Review of patient's allergies indicates:  No Known Allergies  Past Medical History:   Diagnosis Date    Anxiety     Heart murmur     Hypertension     Migraine      Past Surgical History:   Procedure Laterality Date     SECTION      COLONOSCOPY  10/20/2021    COLONOSCOPY N/A 10/20/2021    Procedure: COLONOSCOPY 2 day golytely;  Surgeon: Zheng Woodruff MD;  Location: [facility];  Service: Endoscopy;  Laterality: N/A;    ESOPHAGOGASTRODUODENOSCOPY N/A 10/20/2021    Procedure: EGD (ESOPHAGOGASTRODUODENOSCOPY);  Surgeon: Zheng Woodruff MD;  Location: [facility];  Service: Endoscopy;  Laterality: N/A;    TUBAL LIGATION      Tummy Tuck      UPPER GASTROINTESTINAL ENDOSCOPY  10/20/2021     Family History   Problem Relation Age of Onset    Hypertension Mother     Breast cancer Paternal Aunt     Heart failure Maternal Grandmother     Heart failure Maternal Grandfather      Social History     Tobacco Use    Smoking status: Never    Smokeless tobacco: Never   Substance Use Topics    Alcohol use: Not Currently     Comment: socially    Drug  use: No     Review of Systems   Constitutional:  Negative for fever.   HENT:  Negative for sore throat.    Respiratory:  Negative for shortness of breath.    Cardiovascular:  Negative for chest pain.   Gastrointestinal:  Negative for nausea.   Genitourinary:  Negative for dysuria.   Musculoskeletal:  Negative for back pain.   Skin:  Negative for rash.   Neurological:  Positive for headaches. Negative for weakness.   Hematological:  Does not bruise/bleed easily.     Physical Exam     Initial Vitals [12/13/22 1829]   BP Pulse Resp Temp SpO2   (!) 154/90 94 18 98.6 °F (37 °C) 100 %      MAP       --         Physical Exam    Nursing note and vitals reviewed.  Constitutional: She appears well-developed and well-nourished. No distress.   HENT:   Head: Normocephalic and atraumatic.   Eyes: EOM are normal. Pupils are equal, round, and reactive to light. Right eye exhibits no discharge. Left eye exhibits no discharge.   Neck: Neck supple.   Normal range of motion.  Cardiovascular:  Normal rate and regular rhythm.           Pulmonary/Chest: Breath sounds normal. No respiratory distress.   Musculoskeletal:         General: Normal range of motion.      Cervical back: Normal range of motion and neck supple.     Neurological: She is alert and oriented to person, place, and time. She has normal strength. No cranial nerve deficit or sensory deficit.   Neurovascularly intact   Skin: Skin is warm and dry.   Psychiatric: She has a normal mood and affect. Her behavior is normal.       ED Course   Procedures  Labs Reviewed - No data to display       Imaging Results    None          Medications   sodium chloride 0.9% bolus 100 mL 100 mL (100 mLs Intravenous New Bag 12/13/22 1851)   prochlorperazine injection Soln 10 mg (10 mg Intravenous Given 12/13/22 1851)   diphenhydrAMINE 12.5 mg/5 mL elixir 12.5 mg (12.5 mg Oral Given 12/13/22 1852)     Medical Decision Making:   Initial Assessment:   Headache onset 2 days ago  Differential  Diagnosis:   Tension headache, migraine headache  ED Management:  Headache  -Afebrile, vital signs stable, no apparent distress  -patient reports relief with Compazine, Benadryl and IV fluids in the ED    Plan:  -Patient is in stable condition to be discharged home. Advised patient to follow up with primary care doctor and return to the ED if experiencing any worsening of symptoms.                          Clinical Impression:   Final diagnoses:  [R51.9] Frontal headache (Primary)        ED Disposition Condition    Discharge Stable          ED Prescriptions    None       Follow-up Information       Follow up With Specialties Details Why Contact Info    Luz Masters MD Internal Medicine   [address] [facility] [address]  [phone]               Sonja Corral PA-C  12/13/22 [id number]

## 2022-12-14 NOTE — DISCHARGE INSTRUCTIONS
-Follow up with primary care doctor and return to the ER if you are experiencing any worsening of symptoms    Thank you for letting myself and our team take care for you today! It was nice meeting you, and I hope you feel better very soon. Please come back to [facility] for all of your future medical needs.   Our goal in the ER is to always give you outstanding care and exceptional service. You may receive a survey by mail or email in the next week about your experience in our ED. We would greatly appreciate you completing and returning the survey. Your feedback provides us with a way to recognize our staff who give very good care and it helps us learn how to improve when your experience was below our aspiration of excellence.     Sincerely,     Sonja Corral PA-C  Emergency Room Physician Assistant  [facility]

## 2023-04-11 ENCOUNTER — OFFICE VISIT (OUTPATIENT)
Dept: OBSTETRICS AND GYNECOLOGY | Facility: CLINIC | Age: 38
End: 2023-04-11
Payer: MEDICAID

## 2023-04-11 ENCOUNTER — LAB VISIT (OUTPATIENT)
Dept: LAB | Facility: HOSPITAL | Age: 38
End: 2023-04-11
Attending: OBSTETRICS & GYNECOLOGY
Payer: MEDICAID

## 2023-04-11 VITALS
DIASTOLIC BLOOD PRESSURE: 80 MMHG | SYSTOLIC BLOOD PRESSURE: 132 MMHG | BODY MASS INDEX: 35.04 KG/M2 | WEIGHT: 210.56 LBS

## 2023-04-11 DIAGNOSIS — Z11.3 SCREENING EXAMINATION FOR STD (SEXUALLY TRANSMITTED DISEASE): ICD-10-CM

## 2023-04-11 DIAGNOSIS — N93.9 ABNORMAL UTERINE BLEEDING (AUB): ICD-10-CM

## 2023-04-11 DIAGNOSIS — Z01.419 ENCOUNTER FOR ANNUAL ROUTINE GYNECOLOGICAL EXAMINATION: Primary | ICD-10-CM

## 2023-04-11 DIAGNOSIS — Z12.31 SCREENING MAMMOGRAM FOR BREAST CANCER: ICD-10-CM

## 2023-04-11 DIAGNOSIS — N89.8 VAGINAL DISCHARGE: ICD-10-CM

## 2023-04-11 LAB
ALBUMIN SERPL BCP-MCNC: 3.6 G/DL (ref 3.5–5.2)
ALP SERPL-CCNC: 66 U/L (ref 55–135)
ALT SERPL W/O P-5'-P-CCNC: 20 U/L (ref 10–44)
ANION GAP SERPL CALC-SCNC: 10 MMOL/L (ref 8–16)
AST SERPL-CCNC: 24 U/L (ref 10–40)
BASOPHILS # BLD AUTO: 0.04 K/UL (ref 0–0.2)
BASOPHILS NFR BLD: 0.6 % (ref 0–1.9)
BILIRUB SERPL-MCNC: 0.3 MG/DL (ref 0.1–1)
BUN SERPL-MCNC: 9 MG/DL (ref 6–20)
CALCIUM SERPL-MCNC: 9.9 MG/DL (ref 8.7–10.5)
CHLORIDE SERPL-SCNC: 105 MMOL/L (ref 95–110)
CO2 SERPL-SCNC: 25 MMOL/L (ref 23–29)
CREAT SERPL-MCNC: 1 MG/DL (ref 0.5–1.4)
DIFFERENTIAL METHOD: ABNORMAL
EOSINOPHIL # BLD AUTO: 0.2 K/UL (ref 0–0.5)
EOSINOPHIL NFR BLD: 2.3 % (ref 0–8)
ERYTHROCYTE [DISTWIDTH] IN BLOOD BY AUTOMATED COUNT: 14.6 % (ref 11.5–14.5)
EST. GFR  (NO RACE VARIABLE): >60 ML/MIN/1.73 M^2
GLUCOSE SERPL-MCNC: 84 MG/DL (ref 70–110)
HCT VFR BLD AUTO: 38.5 % (ref 37–48.5)
HGB BLD-MCNC: 12.4 G/DL (ref 12–16)
IMM GRANULOCYTES # BLD AUTO: 0.01 K/UL (ref 0–0.04)
IMM GRANULOCYTES NFR BLD AUTO: 0.2 % (ref 0–0.5)
LYMPHOCYTES # BLD AUTO: 1.8 K/UL (ref 1–4.8)
LYMPHOCYTES NFR BLD: 27.2 % (ref 18–48)
MCH RBC QN AUTO: 29.4 PG (ref 27–31)
MCHC RBC AUTO-ENTMCNC: 32.2 G/DL (ref 32–36)
MCV RBC AUTO: 91 FL (ref 82–98)
MONOCYTES # BLD AUTO: 0.7 K/UL (ref 0.3–1)
MONOCYTES NFR BLD: 10.6 % (ref 4–15)
NEUTROPHILS # BLD AUTO: 3.8 K/UL (ref 1.8–7.7)
NEUTROPHILS NFR BLD: 59.1 % (ref 38–73)
NRBC BLD-RTO: 0 /100 WBC
PLATELET # BLD AUTO: 308 K/UL (ref 150–450)
PMV BLD AUTO: 10.3 FL (ref 9.2–12.9)
POTASSIUM SERPL-SCNC: 4.4 MMOL/L (ref 3.5–5.1)
PROT SERPL-MCNC: 7.3 G/DL (ref 6–8.4)
RBC # BLD AUTO: 4.22 M/UL (ref 4–5.4)
SODIUM SERPL-SCNC: 140 MMOL/L (ref 136–145)
TSH SERPL DL<=0.005 MIU/L-ACNC: 0.64 UIU/ML (ref 0.4–4)
WBC # BLD AUTO: 6.5 K/UL (ref 3.9–12.7)

## 2023-04-11 PROCEDURE — 3079F PR MOST RECENT DIASTOLIC BLOOD PRESSURE 80-89 MM HG: ICD-10-PCS | Mod: CPTII,,, | Performed by: OBSTETRICS & GYNECOLOGY

## 2023-04-11 PROCEDURE — 3075F SYST BP GE 130 - 139MM HG: CPT | Mod: CPTII,,, | Performed by: OBSTETRICS & GYNECOLOGY

## 2023-04-11 PROCEDURE — 1159F MED LIST DOCD IN RCRD: CPT | Mod: CPTII,,, | Performed by: OBSTETRICS & GYNECOLOGY

## 2023-04-11 PROCEDURE — 36415 COLL VENOUS BLD VENIPUNCTURE: CPT | Performed by: OBSTETRICS & GYNECOLOGY

## 2023-04-11 PROCEDURE — 84466 ASSAY OF TRANSFERRIN: CPT | Performed by: OBSTETRICS & GYNECOLOGY

## 2023-04-11 PROCEDURE — 99213 OFFICE O/P EST LOW 20 MIN: CPT | Mod: PBBFAC,PO | Performed by: OBSTETRICS & GYNECOLOGY

## 2023-04-11 PROCEDURE — 99395 PREV VISIT EST AGE 18-39: CPT | Mod: S$PBB,,, | Performed by: OBSTETRICS & GYNECOLOGY

## 2023-04-11 PROCEDURE — 85025 COMPLETE CBC W/AUTO DIFF WBC: CPT | Performed by: OBSTETRICS & GYNECOLOGY

## 2023-04-11 PROCEDURE — 81514 NFCT DS BV&VAGINITIS DNA ALG: CPT | Performed by: OBSTETRICS & GYNECOLOGY

## 2023-04-11 PROCEDURE — 1159F PR MEDICATION LIST DOCUMENTED IN MEDICAL RECORD: ICD-10-PCS | Mod: CPTII,,, | Performed by: OBSTETRICS & GYNECOLOGY

## 2023-04-11 PROCEDURE — 99999 PR PBB SHADOW E&M-EST. PATIENT-LVL III: CPT | Mod: PBBFAC,,, | Performed by: OBSTETRICS & GYNECOLOGY

## 2023-04-11 PROCEDURE — 84443 ASSAY THYROID STIM HORMONE: CPT | Performed by: OBSTETRICS & GYNECOLOGY

## 2023-04-11 PROCEDURE — 87591 N.GONORRHOEAE DNA AMP PROB: CPT | Performed by: OBSTETRICS & GYNECOLOGY

## 2023-04-11 PROCEDURE — 99999 PR PBB SHADOW E&M-EST. PATIENT-LVL III: ICD-10-PCS | Mod: PBBFAC,,, | Performed by: OBSTETRICS & GYNECOLOGY

## 2023-04-11 PROCEDURE — 99395 PR PREVENTIVE VISIT,EST,18-39: ICD-10-PCS | Mod: S$PBB,,, | Performed by: OBSTETRICS & GYNECOLOGY

## 2023-04-11 PROCEDURE — 3079F DIAST BP 80-89 MM HG: CPT | Mod: CPTII,,, | Performed by: OBSTETRICS & GYNECOLOGY

## 2023-04-11 PROCEDURE — 3075F PR MOST RECENT SYSTOLIC BLOOD PRESS GE 130-139MM HG: ICD-10-PCS | Mod: CPTII,,, | Performed by: OBSTETRICS & GYNECOLOGY

## 2023-04-11 PROCEDURE — 3008F PR BODY MASS INDEX (BMI) DOCUMENTED: ICD-10-PCS | Mod: CPTII,,, | Performed by: OBSTETRICS & GYNECOLOGY

## 2023-04-11 PROCEDURE — 3008F BODY MASS INDEX DOCD: CPT | Mod: CPTII,,, | Performed by: OBSTETRICS & GYNECOLOGY

## 2023-04-11 PROCEDURE — 80053 COMPREHEN METABOLIC PANEL: CPT | Performed by: OBSTETRICS & GYNECOLOGY

## 2023-04-11 RX ORDER — TRANEXAMIC ACID 650 MG/1
1300 TABLET ORAL 3 TIMES DAILY PRN
Qty: 30 TABLET | Refills: 11 | Status: SHIPPED | OUTPATIENT
Start: 2023-04-11 | End: 2023-04-16

## 2023-04-11 NOTE — PROGRESS NOTES
Chief Complaint   Patient presents with    Annual Exam       HISTORY OF PRESENT ILLNESS:   Priya Conrad is a 37 y.o. female  who presents for well woman exam.  Patient's last menstrual period was 2023 (approximate)..  She has complains of still having heavy cycles, they are monthly but she will change 12 pads in a day for 1st 2 days. She has noticed when her cycle is heavy that she will get some chest pain. Hasn't had labs done in a while. Has HTN and can't do the patches but didn't really feel like they help.  She has pain but related to constipation.       Past Medical History:   Diagnosis Date    Anxiety     Heart murmur     Hypertension     Migraine        Past Surgical History:   Procedure Laterality Date     SECTION      COLONOSCOPY  10/20/2021    COLONOSCOPY N/A 10/20/2021    Procedure: COLONOSCOPY 2 day golytely;  Surgeon: Zheng Woodruff MD;  Location: [facility];  Service: Endoscopy;  Laterality: N/A;    ESOPHAGOGASTRODUODENOSCOPY N/A 10/20/2021    Procedure: EGD (ESOPHAGOGASTRODUODENOSCOPY);  Surgeon: Zheng Woodruff MD;  Location: [facility];  Service: Endoscopy;  Laterality: N/A;    TUBAL LIGATION      Celsa Zaragoza      UPPER GASTROINTESTINAL ENDOSCOPY  10/20/2021       Social History     Socioeconomic History    Marital status: Single    Number of children: 3   Occupational History    Occupation: medical assistant     Comment: [employer]   Tobacco Use    Smoking status: Never    Smokeless tobacco: Never   Substance and Sexual Activity    Alcohol use: Not Currently     Comment: socially    Drug use: No   Social History Narrative    , 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 Denies PID.  Benign History: has uterine fibroids. Denies ovarian cysts. Denies endometriosis Denies other conditions.  Cancer History: Denies cervical cancer. Denies uterine cancer or hyperplasia. Denies ovarian cancer. Denies vulvar cancer or pre-cancer. Denies vaginal cancer or pre-cancer. Denies breast cancer. Denies colon cancer.  Cycle: 14/mon/7d, heavy and painful   Had BTL   2 relatives on dad side with breast cancer, aunt, postmenopausal, then a cousin in 40s     ROS:  Otherwise negative       Wt 95.5 kg (210 lb 8.6 oz)   LMP 04/03/2023 (Approximate)   BMI 35.04 kg/m²     APPEARANCE: Well nourished, well developed, in no acute distress.  NECK: Neck symmetric   ABDOMEN: Soft. No tenderness or masses. No hernias. No hepatosplenomegaly.  BREASTS: Symmetrical, no skin changes or visible lesions. No palpable masses, nipple discharge or adenopathy bilaterally.  PELVIC:   VULVA: No lesions. Normal female genitalia.  URETHRAL MEATUS: Normal size and location, no lesions, no prolapse.  URETHRA: No masses, tenderness, prolapse or scarring.  VAGINA: Moist and well rugated, thin white discharge, no significant cystocele or rectocele.  CERVIX: No lesions and discharge.  UTERUS: Normal size, regular shape, mobile, non-tender, bladder base nontender.  ADNEXA: No masses or tenderness.        1. Encounter for annual routine gynecological examination    2. Abnormal uterine bleeding (AUB)        Plan:  Routine gyn s/p normal breast exam. Pap not indicated until 2027. STD testing: GC/CT/trich, and affrim done for discharge.   2. Discussed treatment for fibroids and can't do estrogen with her HTN being uncontrolled, will get CBC iron and TSH and  discussed progesterone only choices and lysteda. Would recommend exhausting all choices before surgery since has had several abdominal surgeries. She would like to try lysteda.

## 2023-04-12 ENCOUNTER — PATIENT MESSAGE (OUTPATIENT)
Dept: OBSTETRICS AND GYNECOLOGY | Facility: CLINIC | Age: 38
End: 2023-04-12
Payer: COMMERCIAL

## 2023-04-12 LAB
IRON SERPL-MCNC: 68 UG/DL (ref 30–160)
SATURATED IRON: 17 % (ref 20–50)
TOTAL IRON BINDING CAPACITY: 389 UG/DL (ref 250–450)
TRANSFERRIN SERPL-MCNC: 263 MG/DL (ref 200–375)

## 2023-04-13 LAB
BACTERIAL VAGINOSIS DNA: POSITIVE
C TRACH DNA SPEC QL NAA+PROBE: NOT DETECTED
CANDIDA GLABRATA DNA: NEGATIVE
CANDIDA KRUSEI DNA: NEGATIVE
CANDIDA RRNA VAG QL PROBE: NEGATIVE
N GONORRHOEA DNA SPEC QL NAA+PROBE: NOT DETECTED
T VAGINALIS RRNA GENITAL QL PROBE: POSITIVE

## 2023-04-14 ENCOUNTER — PATIENT MESSAGE (OUTPATIENT)
Dept: OBSTETRICS AND GYNECOLOGY | Facility: CLINIC | Age: 38
End: 2023-04-14
Payer: COMMERCIAL

## 2023-04-14 RX ORDER — METRONIDAZOLE 500 MG/1
500 TABLET ORAL EVERY 12 HOURS
Qty: 14 TABLET | Refills: 1 | Status: SHIPPED | OUTPATIENT
Start: 2023-04-14 | End: 2023-04-21

## 2023-05-02 ENCOUNTER — OUTSIDE PLACE OF SERVICE (OUTPATIENT)
Dept: CARDIOLOGY | Facility: CLINIC | Age: 38
End: 2023-05-02
Payer: MEDICAID

## 2023-05-02 PROCEDURE — 93010 ELECTROCARDIOGRAM REPORT: CPT | Mod: ,,, | Performed by: INTERNAL MEDICINE

## 2023-05-02 PROCEDURE — 93010 PR ELECTROCARDIOGRAM REPORT: ICD-10-PCS | Mod: ,,, | Performed by: INTERNAL MEDICINE

## 2023-05-15 ENCOUNTER — PATIENT MESSAGE (OUTPATIENT)
Dept: GASTROENTEROLOGY | Facility: CLINIC | Age: 38
End: 2023-05-15
Payer: COMMERCIAL

## 2023-05-23 ENCOUNTER — TELEPHONE (OUTPATIENT)
Dept: SURGERY | Facility: CLINIC | Age: 38
End: 2023-05-23
Payer: COMMERCIAL

## 2023-05-23 ENCOUNTER — OFFICE VISIT (OUTPATIENT)
Dept: GASTROENTEROLOGY | Facility: CLINIC | Age: 38
End: 2023-05-23
Payer: MEDICAID

## 2023-05-23 ENCOUNTER — TELEPHONE (OUTPATIENT)
Dept: GASTROENTEROLOGY | Facility: CLINIC | Age: 38
End: 2023-05-23

## 2023-05-23 ENCOUNTER — HOSPITAL ENCOUNTER (OUTPATIENT)
Dept: RADIOLOGY | Facility: HOSPITAL | Age: 38
Discharge: HOME | End: 2023-05-23
Attending: INTERNAL MEDICINE
Payer: MEDICAID

## 2023-05-23 DIAGNOSIS — M62.89 PELVIC FLOOR DYSFUNCTION IN FEMALE: ICD-10-CM

## 2023-05-23 DIAGNOSIS — K59.04 CHRONIC IDIOPATHIC CONSTIPATION: ICD-10-CM

## 2023-05-23 DIAGNOSIS — N81.6 RECTOCELE: ICD-10-CM

## 2023-05-23 DIAGNOSIS — N81.6 RECTOCELE: Primary | ICD-10-CM

## 2023-05-23 DIAGNOSIS — K58.1 IRRITABLE BOWEL SYNDROME WITH CONSTIPATION: ICD-10-CM

## 2023-05-23 PROCEDURE — 74018 XR ABDOMEN AP 1 VIEW: ICD-10-PCS | Mod: 26,,, | Performed by: RADIOLOGY

## 2023-05-23 PROCEDURE — 99214 PR OFFICE/OUTPT VISIT, EST, LEVL IV, 30-39 MIN: ICD-10-PCS | Mod: 95,,, | Performed by: INTERNAL MEDICINE

## 2023-05-23 PROCEDURE — 99214 OFFICE O/P EST MOD 30 MIN: CPT | Mod: 95,,, | Performed by: INTERNAL MEDICINE

## 2023-05-23 PROCEDURE — 74018 RADEX ABDOMEN 1 VIEW: CPT | Mod: 26,,, | Performed by: RADIOLOGY

## 2023-05-23 PROCEDURE — 74018 RADEX ABDOMEN 1 VIEW: CPT | Mod: TC

## 2023-05-23 NOTE — TELEPHONE ENCOUNTER
Pt scheduled at the [facility] for xray and urine test. Informed that PT department will call them to schedule her appointment. Verbal understanding

## 2023-05-23 NOTE — Clinical Note
Could yall possibly see about getting this lady into clinic - it looks like we tried last year and she maybe didn't ever schedule or no showed? She has rectocele and severe constipation and im trying to send her to therapy as well. She now having worsening symptoms, it sounds like she has impaction, I saw her virtually today and gonna get xray

## 2023-05-23 NOTE — PROGRESS NOTES
The patient location is: home  The chief complaint leading to consultation is: followup    Visit type: audiovisual    Face to Face time with patient: 16min  22 minutes of total time spent on the encounter, which includes face to face time and non-face to face time preparing to see the patient (eg, review of tests), Obtaining and/or reviewing separately obtained history, Documenting clinical information in the electronic or other health record, Independently interpreting results (not separately reported) and communicating results to the patient/family/caregiver, or Care coordination (not separately reported).         Each patient to whom he or she provides medical services by telemedicine is:  (1) informed of the relationship between the physician and patient and the respective role of any other health care provider with respect to management of the patient; and (2) notified that he or she may decline to receive medical services by telemedicine and may withdraw from such care at any time.         GASTROENTEROLOGY CLINIC NOTE    Reason for visit: The primary encounter diagnosis was Rectocele. Diagnoses of Pelvic floor dysfunction in female, Chronic idiopathic constipation, and Irritable bowel syndrome with constipation were also pertinent to this visit.  Referring provider/PCP: Luz Masters MD    HPI:  Priya Conrad is a 37 y.o. female here today for abdominal symptoms, new patient.  Previously seen by dr. Cummings at [facility].     Interval  Last visit about 10 months ago.  Trying to work out more. Trying to eat better.  3 weeks of abd pain. Passing liquid but having shooting pains in the rectum when trying to have BM. Sits on toilet and passes squirts of liquid. But then she wipes and stool is on tissue.  No stool in toilet bowl. Feels sluggish, having pain, poor appetite.   Taking linzess 145 every morning. Not really helping.  Tried motegrity - got bad headaches. Not taking anymore  Failed amitiza    Didn't get to  go see dr madison in [place]  Didn't get to pelvic PT - it seems she went and they told her that insurance wasn't covereD?      ============================  Initial clinic visit 8/2021  gerd - burning and itching with swallowing. protonix stopped working. Constant throat clearing. + anxiety. Has good appetite. Drinks lots of water. Tries to eat greens. Assoc Nausea  - all the time with bloating and keeps gas. Worsening in last month. No vomiting but feels like she needs to.   Constipation - ongoing for 1 year, can go > 1 week without a BM. No urgency. Tried multiple things. See below. Has hemorrhoids at times.   trulance was effective, then stopped working ; nothing else has seemed to help  linzess 145   amitiza  miralax  Lactulose  Mag citrate    No family hx of CRC in 1st degree.   Aunt - CRC      Prior Endoscopy:  EGD: / Colon:   10 / 2021 with me  Impression:            - Normal esophagus.                          - Normal stomach. Biopsied.                          - A few gastric polyps. Biopsied. Suspected fundic                          gland polyp, benign.                          - Normal examined duodenum.     Redundant sigmoid, otherwise normal  Repeat 10 years.    Defogram - small rectocele,  residual contrast noted    (Portions of this note were dictated using voice recognition software and may contain dictation related errors in spelling/grammar/syntax not found on text review)    Review of Systems   Constitutional:  Negative for fever and malaise/fatigue.   Respiratory:  Negative for cough and shortness of breath.    Cardiovascular:  Negative for chest pain and palpitations.   Gastrointestinal:  Positive for abdominal pain, constipation and nausea. Negative for blood in stool and vomiting.   Neurological:  Negative for dizziness and headaches.     Past Medical History: has a past medical history of Anxiety, Heart murmur, Hypertension, and Migraine.    Past Surgical History: has a past surgical history  that includes Tummy Tuck;  section; Tubal ligation; Upper gastrointestinal endoscopy (10/20/2021); Colonoscopy (10/20/2021); Esophagogastroduodenoscopy (N/A, 10/20/2021); and Colonoscopy (N/A, 10/20/2021).    Family History:family history includes Breast cancer in her paternal aunt; Heart failure in her maternal grandfather and maternal grandmother; Hypertension in her mother.    Allergies: Review of patient's allergies indicates:  No Known Allergies    Social History: reports that she has never smoked. She has never used smokeless tobacco. She reports that she does not currently use alcohol. She reports that she does not use drugs.    Home medications:   Current Outpatient Medications on File Prior to Visit   Medication Sig Dispense Refill    amLODIPine (NORVASC) 5 MG tablet Take 5 mg by mouth once daily.      gabapentin (NEURONTIN) 100 MG capsule       gabapentin (NEURONTIN) 300 MG capsule       prucalopride (MOTEGRITY) 2 mg Tab Take  1 tablet (2 mg) by mouth once daily at 6am. 30 tablet 6    rimegepant (NURTEC) 75 mg odt Take 75 mg by mouth once as needed for Migraine.       No current facility-administered medications on file prior to visit.       Vital signs:  There were no vitals taken for this visit.    Physical Exam  Vitals reviewed.   Constitutional:       Appearance: She is not toxic-appearing.   Neurological:      Mental Status: She is alert.       I have reviewed prior labs, imaging, notes from last month    TSH and calcium WNL     Assessment:  1. Rectocele    2. Pelvic floor dysfunction in female    3. Chronic idiopathic constipation    4. Irritable bowel syndrome with constipation      Severe constipation  Small rectocele with pelvic dysfunction likely contributing  Has failed numerous medications    Sounds like now she is impacted    Plan:  Orders Placed This Encounter    X-Ray Abdomen AP 1 View    Pregnancy, urine rapid    Ambulatory referral/consult to Physical/Occupational Therapy     linaCLOtide (LINZESS) 290 mcg Cap capsule       Increase to linzess 290    Get Xray today , preg test prior    Needs to do enemas for cleanout    Send to Dr madison [facility]    Send to pelvic PT      RTC prn    Zheng Woodruff MD  [facility]

## 2023-05-23 NOTE — TELEPHONE ENCOUNTER
Left voicemail with callback number regarding upcoming appointment. Reminder slip placed in mail and details viewable on portal.

## 2023-05-23 NOTE — TELEPHONE ENCOUNTER
----- Message from Zheng Woodruff MD sent at 5/23/2023  8:21 AM CDT -----  Please call and arrange for Xray with preg test today

## 2023-05-23 NOTE — Clinical Note
Can we give her some information on PT for pelvic health like a number to call for insurance coverage.

## 2023-05-26 ENCOUNTER — PATIENT MESSAGE (OUTPATIENT)
Dept: GASTROENTEROLOGY | Facility: CLINIC | Age: 38
End: 2023-05-26
Payer: COMMERCIAL

## 2023-05-26 ENCOUNTER — HOSPITAL ENCOUNTER (EMERGENCY)
Facility: HOSPITAL | Age: 38
Discharge: HOME | End: 2023-05-26
Attending: EMERGENCY MEDICINE
Payer: MEDICAID

## 2023-05-26 VITALS
SYSTOLIC BLOOD PRESSURE: 120 MMHG | WEIGHT: 207 LBS | HEART RATE: 85 BPM | BODY MASS INDEX: 34.49 KG/M2 | RESPIRATION RATE: 20 BRPM | OXYGEN SATURATION: 100 % | DIASTOLIC BLOOD PRESSURE: 78 MMHG | HEIGHT: 65 IN | TEMPERATURE: 99 F

## 2023-05-26 DIAGNOSIS — K59.00 CONSTIPATION: ICD-10-CM

## 2023-05-26 LAB
B-HCG UR QL: NEGATIVE
CTP QC/QA: YES

## 2023-05-26 PROCEDURE — 25000003 PHARM REV CODE 250: Mod: ER | Performed by: PHYSICIAN ASSISTANT

## 2023-05-26 PROCEDURE — 99283 EMERGENCY DEPT VISIT LOW MDM: CPT | Mod: ER

## 2023-05-26 PROCEDURE — 81025 URINE PREGNANCY TEST: CPT | Mod: ER | Performed by: PHYSICIAN ASSISTANT

## 2023-05-26 RX ORDER — LACTULOSE 10 G/15ML
20 SOLUTION ORAL
Status: COMPLETED | OUTPATIENT
Start: 2023-05-26 | End: 2023-05-26

## 2023-05-26 RX ORDER — LACTULOSE 10 G/15ML
20 SOLUTION ORAL 2 TIMES DAILY
Qty: 450 ML | Refills: 0 | Status: SHIPPED | OUTPATIENT
Start: 2023-05-26 | End: 2024-03-13

## 2023-05-26 RX ADMIN — LACTULOSE 20 G: 20 SOLUTION ORAL at 02:05

## 2023-05-26 NOTE — ED PROVIDER NOTES
Encounter Date: 2023       History     Chief Complaint   Patient presents with    Constipation     GI sent the patient for a x-ray Tuesday for constipation. Patient took 2 enemas and is taking laxatives daily without relief.       Priya Conrad, a 37 y.o. female  has a past medical history of Anxiety, Heart murmur, Hypertension, and Migraine.     She presents to the ED for evaluation of constipation x 3 weeks.  Recent KUB concerning for impaction.  Tried enemas at home with no production of stool.  States that she has a h/o chronic constipation.          The history is provided by the patient.   Review of patient's allergies indicates:  No Known Allergies  Past Medical History:   Diagnosis Date    Anxiety     Heart murmur     Hypertension     Migraine      Past Surgical History:   Procedure Laterality Date     SECTION      COLONOSCOPY  10/20/2021    COLONOSCOPY N/A 10/20/2021    Procedure: COLONOSCOPY 2 day golytely;  Surgeon: Zheng Woodruff MD;  Location: [facility];  Service: Endoscopy;  Laterality: N/A;    ESOPHAGOGASTRODUODENOSCOPY N/A 10/20/2021    Procedure: EGD (ESOPHAGOGASTRODUODENOSCOPY);  Surgeon: Zheng Woodruff MD;  Location: [facility];  Service: Endoscopy;  Laterality: N/A;    [facility]      UPPER GASTROINTESTINAL ENDOSCOPY  10/20/2021     Family History   Problem Relation Age of Onset    Hypertension Mother     Breast cancer Paternal Aunt     Heart failure Maternal Grandmother     Heart failure Maternal Grandfather      Social History     Tobacco Use    Smoking status: Never    Smokeless tobacco: Never   Substance Use Topics    Alcohol use: Not Currently     Comment: socially    Drug use: No     Review of Systems   Constitutional:  Negative for fever.   Gastrointestinal:  Positive for abdominal distention and constipation. Negative for nausea and vomiting.   Skin:  Negative for color change.   Allergic/Immunologic: Negative for immunocompromised state.    Neurological:  Negative for weakness.   Hematological:  Negative for adenopathy.   All other systems reviewed and are negative.    Physical Exam     Initial Vitals [05/26/23 1329]   BP Pulse Resp Temp SpO2   128/81 85 20 98.5 °F (36.9 °C) 100 %      MAP       --         Physical Exam    Nursing note and vitals reviewed.  Constitutional: She appears well-developed and well-nourished. She is not diaphoretic. No distress.   HENT:   Head: Normocephalic and atraumatic.   Right Ear: External ear normal.   Left Ear: External ear normal.   Nose: Nose normal.   Eyes: Conjunctivae and EOM are normal.   Neck:   Normal range of motion.  Cardiovascular:  Normal rate and regular rhythm.           Pulmonary/Chest: No respiratory distress.   Abdominal: Abdomen is soft. She exhibits distension. Bowel sounds are decreased. There is no abdominal tenderness. There is no rebound and no guarding.   Musculoskeletal:         General: Normal range of motion.      Cervical back: Normal range of motion.     Neurological: She is alert and oriented to person, place, and time.   Skin: Skin is warm. Capillary refill takes less than 2 seconds. No rash noted.   Psychiatric: She has a normal mood and affect. Thought content normal.       ED Course   Procedures  Labs Reviewed   POCT URINE PREGNANCY          Imaging Results              X-Ray Abdomen AP 1 View (KUB) (Final result)  Result time 05/26/23 14:31:34      Final result by Hunter Olivares MD (05/26/23 14:31:34)                   Impression:      Normal study.  Decreased stool in the colon compared to the prior exam.      Electronically signed by: Hunter Olivares  Date:    05/26/2023  Time:    14:31               Narrative:    EXAMINATION:  XR ABDOMEN AP 1 VIEW    CLINICAL HISTORY:  Constipation, unspecified    TECHNIQUE:  Single view abdomen    COMPARISON:  05/23/2023    FINDINGS:  Bowel gas pattern appears normal.  Normal quantity of stool in the colon which is an improvement from the previous  exam.                                       Medications - No data to display  Medical Decision Making:   Initial Assessment:   constipation  Differential Diagnosis:   SBO, constipation, volvulus  Clinical Tests:   Radiological Study: Ordered and Reviewed  ED Management:  Pt presents to ED for evaluation of constipation.  KUB with Bowel gas pattern appears normal.  Normal quantity of stool in the colon which is an improvement from the previous exam.  Soap suds enema and lactulose given with production of stool.  Pt requesting discharge at this time.  Will give RX for lactulose with instruction to f/u with GI and to return with any new or worsening symptoms.                          Clinical Impression:   Final diagnoses:  [K59.00] Constipation               Amber Tavera PA-C  05/26/23 0857

## 2023-05-26 NOTE — Clinical Note
"Priya Mccall" [place] was seen and treated in our emergency department on 5/26/2023.  She may return to work on 05/29/2023.       If you have any questions or concerns, please don't hesitate to call.      Corine DAWSON    "

## 2023-05-31 ENCOUNTER — PATIENT MESSAGE (OUTPATIENT)
Dept: GASTROENTEROLOGY | Facility: CLINIC | Age: 38
End: 2023-05-31
Payer: COMMERCIAL

## 2023-05-31 DIAGNOSIS — M62.89 PELVIC FLOOR DYSFUNCTION IN FEMALE: Primary | ICD-10-CM

## 2023-05-31 RX ORDER — POLYETHYLENE GLYCOL 3350, SODIUM SULFATE ANHYDROUS, SODIUM BICARBONATE, SODIUM CHLORIDE, POTASSIUM CHLORIDE 236; 22.74; 6.74; 5.86; 2.97 G/4L; G/4L; G/4L; G/4L; G/4L
4 POWDER, FOR SOLUTION ORAL ONCE
Qty: 4000 ML | Refills: 0 | Status: SHIPPED | OUTPATIENT
Start: 2023-05-31 | End: 2023-05-31

## 2023-06-23 ENCOUNTER — CLINICAL SUPPORT (OUTPATIENT)
Dept: REHABILITATION | Facility: HOSPITAL | Age: 38
End: 2023-06-23
Attending: INTERNAL MEDICINE
Payer: MEDICAID

## 2023-06-23 ENCOUNTER — TELEPHONE (OUTPATIENT)
Dept: SURGERY | Facility: CLINIC | Age: 38
End: 2023-06-23
Payer: COMMERCIAL

## 2023-06-23 DIAGNOSIS — M62.89 PELVIC FLOOR DYSFUNCTION IN FEMALE: ICD-10-CM

## 2023-06-23 DIAGNOSIS — M62.89 PELVIC FLOOR DYSFUNCTION: Primary | ICD-10-CM

## 2023-06-23 PROCEDURE — 97162 PT EVAL MOD COMPLEX 30 MIN: CPT | Mod: PO

## 2023-06-23 PROCEDURE — 97535 SELF CARE MNGMENT TRAINING: CPT | Mod: PO

## 2023-06-23 PROCEDURE — 97112 NEUROMUSCULAR REEDUCATION: CPT | Mod: PO

## 2023-06-23 RX ORDER — POLYETHYLENE GLYCOL 3350, SODIUM SULFATE ANHYDROUS, SODIUM BICARBONATE, SODIUM CHLORIDE, POTASSIUM CHLORIDE 236; 22.74; 6.74; 5.86; 2.97 G/4L; G/4L; G/4L; G/4L; G/4L
4 POWDER, FOR SOLUTION ORAL ONCE
Qty: 4000 ML | Refills: 0 | Status: SHIPPED | OUTPATIENT
Start: 2023-06-23 | End: 2023-06-23

## 2023-06-23 RX ORDER — POLYETHYLENE GLYCOL 3350, SODIUM SULFATE ANHYDROUS, SODIUM BICARBONATE, SODIUM CHLORIDE, POTASSIUM CHLORIDE 236; 22.74; 6.74; 5.86; 2.97 G/4L; G/4L; G/4L; G/4L; G/4L
4000 POWDER, FOR SOLUTION ORAL ONCE
Status: CANCELLED | OUTPATIENT
Start: 2023-06-23 | End: 2023-06-23

## 2023-06-23 NOTE — PROGRESS NOTES
[facility]  Pelvic Health Physical Therapy Initial Evaluation    Date: 2023   Name: Priya Conrad  [facility] Number: [id number]  Therapy Diagnosis:   Encounter Diagnosis   Name Primary?    Pelvic floor dysfunction Yes     Physician: Zheng Woodruff MD    Physician Orders: PT Eval and Treat    Medical Diagnosis from Referral: pelvic floor dysfunction in female  Evaluation Date: 2023  Authorization Period Expiration: 2024  Plan of Care Expiration: 2023  Visit # / Visits authorized:     Time In: 1:00  Time Out: 2:00  Total Appointment Time (timed & untimed codes): 55 minutes    Precautions: universal    Subjective     Date of onset:     History of current condition - Priya reports: that she has had a long standing (over 10 year) history of constipation.  She has had to strain harder and harder.  She does not feel rectal pressure at all- she does note abdominal bloating and discomfort.  Went a whole month without BM at one point.  Problems started after her Csection in .      OB/GYN History: ; Csection and 2 's, tubal ligation  Sexually active? Yes  Pain with vaginal exams, intercourse or tampon use? No    Bladder/Bowel History: urinary incontinence and constipation/straining for movement  Frequency of urination:   Daytime: every 2-5 hours           Nighttime: 0-1  Difficulty initiating urine stream: No  Urine stream: strong  Complete emptying: Yes  Bladder leakage: Yes  Frequency of incidents: every couple of months when she has been holding it  Amount leaked (urine): few drops  Urinary Urgency: No, Able to delay the urge for at least 30 minute(s).  Frequency of bowel movements:  last was 2 days ago but before that in the ED  Difficulty initiating BM: Yes  Quality/Shape of BM: Riley Stool Chart 5-6  Does Patient Feel Empty after BM? No  Fiber Supplements or Laxative Use? Yes.  Currently at the max dose of Linzess; also daily Lactulose  Colon leakage: No  Form  of protection: none      Pain:  She describes occasional shooting leg pain when sitting on toilet.  No shauna rectal pain.  Abdominal pain and bloating.     Medical History: Priya  has a past medical history of Anxiety, Heart murmur, Hypertension, and Migraine.     Surgical History: Priya Conrad  has a past surgical history that includes Tummy Tuck;  section; Tubal ligation; Upper gastrointestinal endoscopy (10/20/2021); Colonoscopy (10/20/2021); Esophagogastroduodenoscopy (N/A, 10/20/2021); and Colonoscopy (N/A, 10/20/2021).    Medications: Priya has a current medication list which includes the following prescription(s): amlodipine, gabapentin, gabapentin, lactulose, linaclotide, motegrity, and nurtec.    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    Prior Level of Function: could move her bowels without med help prior to   Current Level of Function: cannot move her bowels without medicine help    Types of fluid intake: water and juice; no longer  Diet: TAD; stopped eating rice and milk  Habitus: overweight  Abuse/Neglect: No     Pts goals: to be able to move her bowels without abdominal discomfort.      OBJECTIVE     See EMR under MEDIA for written consent provided 2023  Chaperone: declined    ORTHO SCREEN  Posture in sitting: WNL  Posture in standing: WNL  Pelvic alignment: no sign of deviations noted in supine     ABDOMINAL WALL ASSESSMENT  Abdominal strength: Rectus abdominus: 3/5     Transverse abdominus: weak, poorly isolated contraction  Scarring: tummy tuck scar mobile and non-painful  Diastasis: present greater than 2 finger bulge above umbilicus;  minimal tension generated in linea alba with TA set     BREATHING MECHANICS ASSESSMENT   Thorax Assessment During Quiet Respiration: Decreased excursion of abdominal wall   Thorax Assessment During Deep Respiration: Decreased excursion of abdominal wall     RECTAL PELVIC FLOOR EXAM    EXTERNAL ASSESSMENT  Anus: WNL  Skin condition: WNL   Scarring: none  Sensation: WNL   Pain:  some tailbone pain noted- sitting sometimes uncomfortable  Voluntary contraction: visible lift  Voluntary relaxation: visible drop  Involuntary contraction: visible lift  Bearing down: bulge  Anal [place]: intact  Discharge: none       INTERNAL ASSESSMENT  EAS tone: WNL   Impaction: none   Pain: none  Sensation: unable to localize pressure appropriately   Muscle Bulk: hypertonus   Muscle Power: 2/5  Muscle Endurance: 3 sec       Quality of contraction: decreased hold and incomplete relaxation   Specificity: patient contracts: gluts  Coordination: tends to hold breath during PFM contraction   Comments:     Limitation/Restriction for FOTO PFDI Bowel Survey    Therapist reviewed FOTO scores for Priya Conrad on 6/23/2023.   FOTO documents entered into [employer] - see Media section.    Limitation Score: 13%       TREATMENT     Treatment Time In: 1:35  Treatment Time Out: 2:00  Total Treatment time (time-based codes) separate from Evaluation: 25 minutes    Neuromuscular Re-education to develop Down training for 15 minutes including:   diaphragmatic breathing    Self-Care for 10 minutes including:   Instruction in colon massage to be performed after every meal     Patient Education provided:   general anatomy/physiology of urinary/ bowel  system, benefits of treatment, risks of treatment, and alternative methods of treatment were discussed with the pt. Additionally, anatomy/physiology of pelvic floor, posture/body mechanices, and diaphragmatic breathing were reviewed.     Home Exercises provided:  Written Home Exercises provided: yes.  Exercises were  reviewed and Priya was able to demonstrate them prior to the end of the session.    Priya demonstrated good  understanding of the education provided.     See EMR under Patient Instructions for exercises provided 6/23/2023.    Assessment     Priya is a 37 y.o. female referred to outpatient Physical Therapy with a medical diagnosis of Pelvic floor dysfunction in female [M62.89]. Pt presents with poor knowledge of body mechanics and posture, poor trunk stability, decreased pelvic muscle strength, decreased endurance of the pelvic muscles, increased tension of the pelvic muscles, poor quality of pelvic muscle contraction, poor coordination of pelvic floor muscles during ADL's leading to urinary or fecal leakage, and dysfunctional defecation.       Pt prognosis is Good.   Pt will benefit from skilled outpatient Physical Therapy to address the deficits stated above and in the chart below, provide pt/family education, and to maximize pt's level of independence.     Plan of care discussed with patient: Yes  Pt's spiritual, cultural and educational needs considered and patient is agreeable to the plan of care and goals as stated below:     Anticipated Barriers for therapy: none    Medical Necessity is demonstrated by the following:    History  Co-morbidities and personal factors that may impact the plan of care Co-morbidities   anxiety, chronic constipation, and prior pelvic surgery    Personal Factors  no deficits     moderate   Examination  Body structures and functions, activity limitations and participation restrictions that may impact the plan of care Body Regions/Systems/Functions:  poor knowledge of body mechanics and posture, poor trunk stability, decreased pelvic muscle strength, decreased endurance of the pelvic muscles, increased tension of the pelvic muscles, poor quality of pelvic muscle contraction, poor coordination of pelvic floor muscles during ADL's leading to urinary or fecal leakage, and  dysfunctional defecation     Activity Limitations:  bearing down for BM, initiating a BM, incontinence with ADLs, and bathroom mapping    Participation Restrictions:  all ADLs/iADLs uninterrupted by urinary incontinence/urgency/frequency and all ADLs/iADLs uninterrupted by discomfort associated with chronic constipation    Activity limitations:   Learning and applying knowledge  None    General Tasks and Commands  None    Communication  None    Mobility  None    Self care  None    Domestic Life  None    Interactions/Relationships  None    Life Areas  None    Community and Social Life  None       moderate   Clinical Presentation unstable clinical presentation with unpredictable characteristics high   Decision Making/ Complexity Score: moderate       Goals:  Long Term Goals: 12 weeks   Pt will report improved ability to perform ADLs (ie. dressing, bathing, functional transfers) with little (drops) to no urinary leakage 7/7 days per week.  Pt will verbalize improved awareness of PFM activity as palpated by PT in order to improve activity involvement with HEP.  Pt will report improved ability to manage bladder spasms appropriately 100% of the time for an improvement in urinary frequency/urgency.   Pt will be independent with double voiding techniques 100% of the time to ensure full bladder emptying and decrease pt's risk of infection.   Pt will bear down appropriately 80% of the time for more effective stooling and prevent adverse effects to adjacent structures.   Pt will be independent in colon massage for more effective stooling.  Pt/family will be independent with HEP for continued self-management of symptoms.  Pt to report an overall decrease in abdominal pain during ADL's      Plan     Plan of care Certification: 6/23/2023 to 9/23/2023.    Outpatient Physical Therapy 1 times per 2 week(s)  for 12 weeks to include the following interventions: therapeutic exercises, therapeutic activity, neuromuscular re-education,  manual therapy, patient/family education, and self care/home management    Gracy Doe, PT, BCB-PMD

## 2023-06-23 NOTE — TELEPHONE ENCOUNTER
Spoke with patient and explained that there are no sooner appointments and that she is on the wait list. Rodrigo requested for approval and sent to Dr. Woodruff for constipation. Patient verbalizes understanding.

## 2023-06-23 NOTE — TELEPHONE ENCOUNTER
----- Message from Lisa Gandhi sent at 6/23/2023 10:20 AM CDT -----  Regarding: sooner appt that 7/21/2023  Contact: @ [phone]  Pt is calling to speak to someone in the office; asking for a sooner appt than what they are scheduled for on 7/21/2023. Pt is already on the wait list; no available appts in Epic that are coming up soon. Pt is asking to speak to someone in the office. Please call to advise. Thanks.         Reason for sooner appt: pts stated that she is constantly impacted with bowels and has to keep going to the ER for relief.         Pt's DX: General CRS/CRS Follow Up (Hemorrhoid, Referral)           Communication Preference: call @ [phone] or send a message through the portal

## 2023-06-23 NOTE — PATIENT INSTRUCTIONS
"Home Exercise Program: 06/23/2023    360 Breathing Technique          Place your hands over the sides of your lower belly.  Inhale long, slow and deep. You should feel as if your lower ribs are expanding in all directions like the way an umbrella opens. You should feel the belly, back and sides gently expand and you may notice a relaxation in the pelvic floor. Then exhale and allow your belly to "snap back".      Continue to breathe like this for 2-3 minutes. Repeat 2 times/day.      Home Exercise Program: 06/23/2023    Bowel Massage for Constipation and Bloating            Positioning: Lie on your back with legs in a comfortable position. Can also performed in a reclined position.     Ascending/Descending Colon: Start at lower right pelvis. Hands should be just inside pelvic bones. Work your way up towards your right ribs. Then continue the massage across your belly towards your left ribs and then work your way down towards your left pubic bone. Use your fingertips, knuckles, or the heel of your hand to make small, kneading, clockwise-circles.     This should NOT cause any pain.      Perform for 5 minutes after every meal.  "

## 2023-07-09 ENCOUNTER — HOSPITAL ENCOUNTER (OUTPATIENT)
Facility: HOSPITAL | Age: 38
Discharge: HOME | End: 2023-07-11
Attending: EMERGENCY MEDICINE | Admitting: SURGERY
Payer: COMMERCIAL

## 2023-07-09 DIAGNOSIS — K35.30 ACUTE APPENDICITIS WITH LOCALIZED PERITONITIS, WITHOUT PERFORATION, ABSCESS, OR GANGRENE: Primary | ICD-10-CM

## 2023-07-09 LAB
ALBUMIN SERPL BCP-MCNC: 4.2 G/DL (ref 3.5–5.2)
ALP SERPL-CCNC: 65 U/L (ref 38–126)
ALT SERPL W/O P-5'-P-CCNC: 26 U/L (ref 10–44)
ANION GAP SERPL CALC-SCNC: 8 MMOL/L (ref 8–16)
AST SERPL-CCNC: 31 U/L (ref 15–46)
B-HCG UR QL: NEGATIVE
BACTERIA #/AREA URNS AUTO: ABNORMAL /HPF
BASOPHILS # BLD AUTO: 0.03 K/UL (ref 0–0.2)
BASOPHILS NFR BLD: 0.3 % (ref 0–1.9)
BILIRUB SERPL-MCNC: 0.5 MG/DL (ref 0.1–1)
BILIRUB UR QL STRIP: NEGATIVE
CALCIUM SERPL-MCNC: 9.1 MG/DL (ref 8.7–10.5)
CHLORIDE SERPL-SCNC: 102 MMOL/L (ref 95–110)
CLARITY UR REFRACT.AUTO: CLEAR
CO2 SERPL-SCNC: 28 MMOL/L (ref 23–29)
COLOR UR AUTO: YELLOW
CREAT SERPL-MCNC: 0.84 MG/DL (ref 0.5–1.4)
CTP QC/QA: YES
DIFFERENTIAL METHOD: ABNORMAL
EOSINOPHIL # BLD AUTO: 0 K/UL (ref 0–0.5)
EOSINOPHIL NFR BLD: 0.1 % (ref 0–8)
ERYTHROCYTE [DISTWIDTH] IN BLOOD BY AUTOMATED COUNT: 13.6 % (ref 11.5–14.5)
EST. GFR  (NO RACE VARIABLE): >60 ML/MIN/1.73 M^2
GLUCOSE SERPL-MCNC: 104 MG/DL (ref 70–110)
GLUCOSE UR QL STRIP: NEGATIVE
HCT VFR BLD AUTO: 38.3 % (ref 37–48.5)
HGB BLD-MCNC: 12.8 G/DL (ref 12–16)
HGB UR QL STRIP: NEGATIVE
IMM GRANULOCYTES # BLD AUTO: 0.02 K/UL (ref 0–0.04)
IMM GRANULOCYTES NFR BLD AUTO: 0.2 % (ref 0–0.5)
KETONES UR QL STRIP: NEGATIVE
LEUKOCYTE ESTERASE UR QL STRIP: ABNORMAL
LIPASE SERPL-CCNC: 47 U/L (ref 23–300)
LYMPHOCYTES # BLD AUTO: 1.6 K/UL (ref 1–4.8)
LYMPHOCYTES NFR BLD: 16.2 % (ref 18–48)
MCH RBC QN AUTO: 29.5 PG (ref 27–31)
MCHC RBC AUTO-ENTMCNC: 33.4 G/DL (ref 32–36)
MCV RBC AUTO: 88 FL (ref 82–98)
MICROSCOPIC COMMENT: ABNORMAL
MONOCYTES # BLD AUTO: 1.4 K/UL (ref 0.3–1)
MONOCYTES NFR BLD: 14.3 % (ref 4–15)
NEUTROPHILS # BLD AUTO: 6.6 K/UL (ref 1.8–7.7)
NEUTROPHILS NFR BLD: 68.9 % (ref 38–73)
NITRITE UR QL STRIP: NEGATIVE
NRBC BLD-RTO: 0 /100 WBC
PH UR STRIP: 8 [PH] (ref 5–8)
PLATELET # BLD AUTO: 275 K/UL (ref 150–450)
PMV BLD AUTO: 10.3 FL (ref 9.2–12.9)
POTASSIUM SERPL-SCNC: 3.2 MMOL/L (ref 3.5–5.1)
PROT SERPL-MCNC: 7.7 G/DL (ref 6–8.4)
PROT UR QL STRIP: NEGATIVE
RBC # BLD AUTO: 4.34 M/UL (ref 4–5.4)
RBC #/AREA URNS AUTO: 2 /HPF (ref 0–4)
SODIUM SERPL-SCNC: 138 MMOL/L (ref 136–145)
SP GR UR STRIP: 1.01 (ref 1–1.03)
URN SPEC COLLECT METH UR: ABNORMAL
UROBILINOGEN UR STRIP-ACNC: 1 EU/DL
UUN UR-MCNC: 6 MG/DL (ref 7–17)
WBC # BLD AUTO: 9.58 K/UL (ref 3.9–12.7)
WBC #/AREA URNS AUTO: 20 /HPF (ref 0–5)

## 2023-07-09 PROCEDURE — 83690 ASSAY OF LIPASE: CPT | Mod: ER | Performed by: EMERGENCY MEDICINE

## 2023-07-09 PROCEDURE — 87086 URINE CULTURE/COLONY COUNT: CPT | Mod: ER | Performed by: EMERGENCY MEDICINE

## 2023-07-09 PROCEDURE — 80053 COMPREHEN METABOLIC PANEL: CPT | Mod: ER | Performed by: EMERGENCY MEDICINE

## 2023-07-09 PROCEDURE — 96365 THER/PROPH/DIAG IV INF INIT: CPT | Mod: 59,ER

## 2023-07-09 PROCEDURE — 96375 TX/PRO/DX INJ NEW DRUG ADDON: CPT | Mod: ER

## 2023-07-09 PROCEDURE — 85025 COMPLETE CBC W/AUTO DIFF WBC: CPT | Mod: ER | Performed by: EMERGENCY MEDICINE

## 2023-07-09 PROCEDURE — 81025 URINE PREGNANCY TEST: CPT | Mod: ER | Performed by: EMERGENCY MEDICINE

## 2023-07-09 PROCEDURE — 96361 HYDRATE IV INFUSION ADD-ON: CPT | Mod: ER

## 2023-07-09 PROCEDURE — 25000003 PHARM REV CODE 250: Mod: ER | Performed by: EMERGENCY MEDICINE

## 2023-07-09 PROCEDURE — S5010 5% DEXTROSE AND 0.45% SALINE: HCPCS | Mod: ER | Performed by: EMERGENCY MEDICINE

## 2023-07-09 PROCEDURE — 96376 TX/PRO/DX INJ SAME DRUG ADON: CPT | Mod: ER

## 2023-07-09 PROCEDURE — 25500020 PHARM REV CODE 255: Mod: ER | Performed by: EMERGENCY MEDICINE

## 2023-07-09 PROCEDURE — 63600175 PHARM REV CODE 636 W HCPCS: Mod: ER | Performed by: EMERGENCY MEDICINE

## 2023-07-09 PROCEDURE — 99285 EMERGENCY DEPT VISIT HI MDM: CPT | Mod: 25,ER

## 2023-07-09 PROCEDURE — 81000 URINALYSIS NONAUTO W/SCOPE: CPT | Mod: ER | Performed by: EMERGENCY MEDICINE

## 2023-07-09 RX ORDER — MORPHINE SULFATE 4 MG/ML
2 INJECTION, SOLUTION INTRAMUSCULAR; INTRAVENOUS
Status: COMPLETED | OUTPATIENT
Start: 2023-07-09 | End: 2023-07-09

## 2023-07-09 RX ORDER — DEXTROSE MONOHYDRATE AND SODIUM CHLORIDE 5; .45 G/100ML; G/100ML
1000 INJECTION, SOLUTION INTRAVENOUS CONTINUOUS
Status: ACTIVE | OUTPATIENT
Start: 2023-07-09 | End: 2023-07-10

## 2023-07-09 RX ADMIN — IOHEXOL 100 ML: 350 INJECTION, SOLUTION INTRAVENOUS at 09:07

## 2023-07-09 RX ADMIN — MORPHINE SULFATE 2 MG: 4 INJECTION INTRAVENOUS at 11:07

## 2023-07-09 RX ADMIN — PIPERACILLIN AND TAZOBACTAM 4.5 G: 4; .5 INJECTION, POWDER, LYOPHILIZED, FOR SOLUTION INTRAVENOUS; PARENTERAL at 10:07

## 2023-07-09 RX ADMIN — DEXTROSE AND SODIUM CHLORIDE 1000 ML: 5; 450 INJECTION, SOLUTION INTRAVENOUS at 10:07

## 2023-07-09 RX ADMIN — MORPHINE SULFATE 2 MG: 4 INJECTION INTRAVENOUS at 10:07

## 2023-07-09 NOTE — LETTER
July 11, 2023         [address] [place] [address] [place] [address]  Phone: [phone]  Fax: [phone]       Patient: Priya Conrad   YOB: 1985  Date of Visit: 07/11/2023    To Whom It May Concern:    Kathy Conrad  was at [facility] on 07/09/2023 thru 07/11/2023. The patient may return to work/school on July 25, 2023 If you have any questions or concerns, or if I can be of further assistance, please do not hesitate to contact me.    Sincerely,    Devon To RN

## 2023-07-10 ENCOUNTER — ANESTHESIA (OUTPATIENT)
Dept: SURGERY | Facility: HOSPITAL | Age: 38
End: 2023-07-10
Payer: COMMERCIAL

## 2023-07-10 ENCOUNTER — ANESTHESIA EVENT (OUTPATIENT)
Dept: SURGERY | Facility: HOSPITAL | Age: 38
End: 2023-07-10
Payer: COMMERCIAL

## 2023-07-10 PROBLEM — K35.80 ACUTE APPENDICITIS: Status: ACTIVE | Noted: 2023-07-10

## 2023-07-10 PROCEDURE — 27201423 OPTIME MED/SURG SUP & DEVICES STERILE SUPPLY: Performed by: SURGERY

## 2023-07-10 PROCEDURE — 00840 ANES IPER PX LOWER ABD NOS: CPT | Performed by: SURGERY

## 2023-07-10 PROCEDURE — 94761 N-INVAS EAR/PLS OXIMETRY MLT: CPT

## 2023-07-10 PROCEDURE — S5010 5% DEXTROSE AND 0.45% SALINE: HCPCS | Performed by: STUDENT IN AN ORGANIZED HEALTH CARE EDUCATION/TRAINING PROGRAM

## 2023-07-10 PROCEDURE — 71000033 HC RECOVERY, INTIAL HOUR: Performed by: SURGERY

## 2023-07-10 PROCEDURE — 44970 PR LAP,APPENDECTOMY: ICD-10-PCS | Mod: ,,, | Performed by: SURGERY

## 2023-07-10 PROCEDURE — S5010 5% DEXTROSE AND 0.45% SALINE: HCPCS

## 2023-07-10 PROCEDURE — 25000003 PHARM REV CODE 250: Performed by: STUDENT IN AN ORGANIZED HEALTH CARE EDUCATION/TRAINING PROGRAM

## 2023-07-10 PROCEDURE — 96361 HYDRATE IV INFUSION ADD-ON: CPT | Mod: 59

## 2023-07-10 PROCEDURE — D9220A PRA ANESTHESIA: Mod: CRNA,,, | Performed by: NURSE ANESTHETIST, CERTIFIED REGISTERED

## 2023-07-10 PROCEDURE — 88304 TISSUE EXAM BY PATHOLOGIST: CPT | Performed by: PATHOLOGY

## 2023-07-10 PROCEDURE — 63600175 PHARM REV CODE 636 W HCPCS

## 2023-07-10 PROCEDURE — 25000003 PHARM REV CODE 250: Performed by: ANESTHESIOLOGY

## 2023-07-10 PROCEDURE — 99222 PR INITIAL HOSPITAL CARE,LEVL II: ICD-10-PCS | Mod: 57,,, | Performed by: SURGERY

## 2023-07-10 PROCEDURE — 36000708 HC OR TIME LEV III 1ST 15 MIN: Performed by: SURGERY

## 2023-07-10 PROCEDURE — 88304 PR  SURG PATH,LEVEL III: ICD-10-PCS | Mod: 26,,, | Performed by: PATHOLOGY

## 2023-07-10 PROCEDURE — 88304 TISSUE EXAM BY PATHOLOGIST: CPT | Mod: 26,,, | Performed by: PATHOLOGY

## 2023-07-10 PROCEDURE — 37000008 HC ANESTHESIA 1ST 15 MINUTES: Performed by: SURGERY

## 2023-07-10 PROCEDURE — 71000039 HC RECOVERY, EACH ADD'L HOUR: Performed by: SURGERY

## 2023-07-10 PROCEDURE — 44970 LAPAROSCOPY APPENDECTOMY: CPT | Mod: ,,, | Performed by: SURGERY

## 2023-07-10 PROCEDURE — 25000003 PHARM REV CODE 250: Performed by: NURSE ANESTHETIST, CERTIFIED REGISTERED

## 2023-07-10 PROCEDURE — 25000003 PHARM REV CODE 250: Performed by: SURGERY

## 2023-07-10 PROCEDURE — 96375 TX/PRO/DX INJ NEW DRUG ADDON: CPT | Mod: 59

## 2023-07-10 PROCEDURE — 96376 TX/PRO/DX INJ SAME DRUG ADON: CPT | Mod: 59

## 2023-07-10 PROCEDURE — 37000009 HC ANESTHESIA EA ADD 15 MINS: Performed by: SURGERY

## 2023-07-10 PROCEDURE — 99222 1ST HOSP IP/OBS MODERATE 55: CPT | Mod: 57,,, | Performed by: SURGERY

## 2023-07-10 PROCEDURE — 63600175 PHARM REV CODE 636 W HCPCS: Performed by: ANESTHESIOLOGY

## 2023-07-10 PROCEDURE — 96366 THER/PROPH/DIAG IV INF ADDON: CPT | Mod: 59

## 2023-07-10 PROCEDURE — 63600175 PHARM REV CODE 636 W HCPCS: Performed by: NURSE ANESTHETIST, CERTIFIED REGISTERED

## 2023-07-10 PROCEDURE — D9220A PRA ANESTHESIA: ICD-10-PCS | Mod: ANES,,, | Performed by: UROLOGY

## 2023-07-10 PROCEDURE — 36000709 HC OR TIME LEV III EA ADD 15 MIN: Performed by: SURGERY

## 2023-07-10 PROCEDURE — G0378 HOSPITAL OBSERVATION PER HR: HCPCS

## 2023-07-10 PROCEDURE — D9220A PRA ANESTHESIA: Mod: ANES,,, | Performed by: UROLOGY

## 2023-07-10 PROCEDURE — 63600175 PHARM REV CODE 636 W HCPCS: Performed by: SURGERY

## 2023-07-10 PROCEDURE — D9220A PRA ANESTHESIA: ICD-10-PCS | Mod: CRNA,,, | Performed by: NURSE ANESTHETIST, CERTIFIED REGISTERED

## 2023-07-10 PROCEDURE — 96361 HYDRATE IV INFUSION ADD-ON: CPT | Mod: 59,ER

## 2023-07-10 PROCEDURE — 25000003 PHARM REV CODE 250

## 2023-07-10 RX ORDER — ONDANSETRON 2 MG/ML
4 INJECTION INTRAMUSCULAR; INTRAVENOUS DAILY PRN
Status: DISCONTINUED | OUTPATIENT
Start: 2023-07-10 | End: 2023-07-10

## 2023-07-10 RX ORDER — PROCHLORPERAZINE EDISYLATE 5 MG/ML
5 INJECTION INTRAMUSCULAR; INTRAVENOUS EVERY 30 MIN PRN
Status: DISCONTINUED | OUTPATIENT
Start: 2023-07-10 | End: 2023-07-10

## 2023-07-10 RX ORDER — KETOROLAC TROMETHAMINE 30 MG/ML
INJECTION, SOLUTION INTRAMUSCULAR; INTRAVENOUS
Status: DISCONTINUED | OUTPATIENT
Start: 2023-07-10 | End: 2023-07-10

## 2023-07-10 RX ORDER — SIMETHICONE 125 MG
125 TABLET,CHEWABLE ORAL EVERY 6 HOURS PRN
Status: DISCONTINUED | OUTPATIENT
Start: 2023-07-10 | End: 2023-07-11 | Stop reason: HOSPADM

## 2023-07-10 RX ORDER — KETAMINE HCL IN 0.9 % NACL 50 MG/5 ML
SYRINGE (ML) INTRAVENOUS
Status: DISCONTINUED | OUTPATIENT
Start: 2023-07-10 | End: 2023-07-10

## 2023-07-10 RX ORDER — HYDROMORPHONE HYDROCHLORIDE 1 MG/ML
0.5 INJECTION, SOLUTION INTRAMUSCULAR; INTRAVENOUS; SUBCUTANEOUS EVERY 6 HOURS PRN
Status: DISCONTINUED | OUTPATIENT
Start: 2023-07-10 | End: 2023-07-10

## 2023-07-10 RX ORDER — OXYCODONE HYDROCHLORIDE 5 MG/1
5 TABLET ORAL EVERY 4 HOURS PRN
Status: DISCONTINUED | OUTPATIENT
Start: 2023-07-10 | End: 2023-07-11 | Stop reason: HOSPADM

## 2023-07-10 RX ORDER — HYDROMORPHONE HYDROCHLORIDE 2 MG/ML
0.2 INJECTION, SOLUTION INTRAMUSCULAR; INTRAVENOUS; SUBCUTANEOUS EVERY 5 MIN PRN
Status: DISCONTINUED | OUTPATIENT
Start: 2023-07-10 | End: 2023-07-10

## 2023-07-10 RX ORDER — SUCCINYLCHOLINE CHLORIDE 20 MG/ML
INJECTION INTRAMUSCULAR; INTRAVENOUS
Status: DISCONTINUED | OUTPATIENT
Start: 2023-07-10 | End: 2023-07-10

## 2023-07-10 RX ORDER — FENTANYL CITRATE 50 UG/ML
25 INJECTION, SOLUTION INTRAMUSCULAR; INTRAVENOUS EVERY 5 MIN PRN
Status: DISCONTINUED | OUTPATIENT
Start: 2023-07-10 | End: 2023-07-10

## 2023-07-10 RX ORDER — PROPOFOL 10 MG/ML
VIAL (ML) INTRAVENOUS
Status: DISCONTINUED | OUTPATIENT
Start: 2023-07-10 | End: 2023-07-10

## 2023-07-10 RX ORDER — DEXAMETHASONE SODIUM PHOSPHATE 4 MG/ML
INJECTION, SOLUTION INTRA-ARTICULAR; INTRALESIONAL; INTRAMUSCULAR; INTRAVENOUS; SOFT TISSUE
Status: DISCONTINUED | OUTPATIENT
Start: 2023-07-10 | End: 2023-07-10

## 2023-07-10 RX ORDER — MIDAZOLAM HYDROCHLORIDE 1 MG/ML
INJECTION INTRAMUSCULAR; INTRAVENOUS
Status: DISCONTINUED | OUTPATIENT
Start: 2023-07-10 | End: 2023-07-10

## 2023-07-10 RX ORDER — OXYCODONE AND ACETAMINOPHEN 5; 325 MG/1; MG/1
1 TABLET ORAL
Status: DISCONTINUED | OUTPATIENT
Start: 2023-07-10 | End: 2023-07-10

## 2023-07-10 RX ORDER — ONDANSETRON 2 MG/ML
4 INJECTION INTRAMUSCULAR; INTRAVENOUS EVERY 6 HOURS PRN
Status: DISCONTINUED | OUTPATIENT
Start: 2023-07-10 | End: 2023-07-11 | Stop reason: HOSPADM

## 2023-07-10 RX ORDER — ROCURONIUM BROMIDE 10 MG/ML
INJECTION, SOLUTION INTRAVENOUS
Status: DISCONTINUED | OUTPATIENT
Start: 2023-07-10 | End: 2023-07-10

## 2023-07-10 RX ORDER — FENTANYL CITRATE 50 UG/ML
INJECTION, SOLUTION INTRAMUSCULAR; INTRAVENOUS
Status: DISCONTINUED | OUTPATIENT
Start: 2023-07-10 | End: 2023-07-10

## 2023-07-10 RX ORDER — SODIUM CHLORIDE 9 MG/ML
INJECTION, SOLUTION INTRAVENOUS
Status: DISCONTINUED | OUTPATIENT
Start: 2023-07-10 | End: 2023-07-11 | Stop reason: HOSPADM

## 2023-07-10 RX ORDER — ONDANSETRON HYDROCHLORIDE 2 MG/ML
INJECTION, SOLUTION INTRAMUSCULAR; INTRAVENOUS
Status: DISCONTINUED | OUTPATIENT
Start: 2023-07-10 | End: 2023-07-10

## 2023-07-10 RX ORDER — LIDOCAINE HYDROCHLORIDE 20 MG/ML
INJECTION INTRAVENOUS
Status: DISCONTINUED | OUTPATIENT
Start: 2023-07-10 | End: 2023-07-10

## 2023-07-10 RX ORDER — LIDOCAINE HYDROCHLORIDE 10 MG/ML
INJECTION INFILTRATION; PERINEURAL
Status: DISCONTINUED | OUTPATIENT
Start: 2023-07-10 | End: 2023-07-10 | Stop reason: HOSPADM

## 2023-07-10 RX ORDER — MORPHINE SULFATE 4 MG/ML
4 INJECTION, SOLUTION INTRAMUSCULAR; INTRAVENOUS EVERY 4 HOURS PRN
Status: DISCONTINUED | OUTPATIENT
Start: 2023-07-10 | End: 2023-07-10

## 2023-07-10 RX ORDER — ACETAMINOPHEN 10 MG/ML
INJECTION, SOLUTION INTRAVENOUS
Status: DISCONTINUED | OUTPATIENT
Start: 2023-07-10 | End: 2023-07-10

## 2023-07-10 RX ORDER — BUPIVACAINE HYDROCHLORIDE 5 MG/ML
INJECTION, SOLUTION PERINEURAL
Status: DISCONTINUED | OUTPATIENT
Start: 2023-07-10 | End: 2023-07-10 | Stop reason: HOSPADM

## 2023-07-10 RX ORDER — DEXTROSE MONOHYDRATE AND SODIUM CHLORIDE 5; .45 G/100ML; G/100ML
INJECTION, SOLUTION INTRAVENOUS CONTINUOUS
Status: DISCONTINUED | OUTPATIENT
Start: 2023-07-10 | End: 2023-07-11

## 2023-07-10 RX ORDER — DEXTROSE MONOHYDRATE AND SODIUM CHLORIDE 5; .45 G/100ML; G/100ML
1000 INJECTION, SOLUTION INTRAVENOUS CONTINUOUS
Status: DISCONTINUED | OUTPATIENT
Start: 2023-07-10 | End: 2023-07-10

## 2023-07-10 RX ADMIN — ONDANSETRON 4 MG: 2 INJECTION INTRAMUSCULAR; INTRAVENOUS at 02:07

## 2023-07-10 RX ADMIN — Medication 20 MG: at 10:07

## 2023-07-10 RX ADMIN — DEXTROSE AND SODIUM CHLORIDE 1000 ML: 5; 450 INJECTION, SOLUTION INTRAVENOUS at 08:07

## 2023-07-10 RX ADMIN — FENTANYL CITRATE 100 MCG: 0.05 INJECTION, SOLUTION INTRAMUSCULAR; INTRAVENOUS at 10:07

## 2023-07-10 RX ADMIN — MORPHINE SULFATE 4 MG: 4 INJECTION INTRAVENOUS at 06:07

## 2023-07-10 RX ADMIN — PIPERACILLIN AND TAZOBACTAM 4.5 G: 4; .5 INJECTION, POWDER, LYOPHILIZED, FOR SOLUTION INTRAVENOUS; PARENTERAL at 03:07

## 2023-07-10 RX ADMIN — OXYCODONE HYDROCHLORIDE 5 MG: 5 TABLET ORAL at 10:07

## 2023-07-10 RX ADMIN — OXYCODONE HYDROCHLORIDE AND ACETAMINOPHEN 1 TABLET: 5; 325 TABLET ORAL at 12:07

## 2023-07-10 RX ADMIN — SUCCINYLCHOLINE CHLORIDE 160 MG: 20 INJECTION, SOLUTION INTRAMUSCULAR; INTRAVENOUS at 10:07

## 2023-07-10 RX ADMIN — PROPOFOL 150 MG: 10 INJECTION, EMULSION INTRAVENOUS at 10:07

## 2023-07-10 RX ADMIN — HYDROMORPHONE HYDROCHLORIDE 0.2 MG: 2 INJECTION, SOLUTION INTRAMUSCULAR; INTRAVENOUS; SUBCUTANEOUS at 12:07

## 2023-07-10 RX ADMIN — PIPERACILLIN AND TAZOBACTAM 4.5 G: 4; .5 INJECTION, POWDER, LYOPHILIZED, FOR SOLUTION INTRAVENOUS; PARENTERAL at 10:07

## 2023-07-10 RX ADMIN — ROCURONIUM BROMIDE 50 MG: 10 INJECTION, SOLUTION INTRAVENOUS at 10:07

## 2023-07-10 RX ADMIN — DEXAMETHASONE SODIUM PHOSPHATE 8 MG: 4 INJECTION, SOLUTION INTRA-ARTICULAR; INTRALESIONAL; INTRAMUSCULAR; INTRAVENOUS; SOFT TISSUE at 10:07

## 2023-07-10 RX ADMIN — Medication 10 MG: at 10:07

## 2023-07-10 RX ADMIN — ROCURONIUM BROMIDE 5 MG: 10 INJECTION, SOLUTION INTRAVENOUS at 10:07

## 2023-07-10 RX ADMIN — MIDAZOLAM HYDROCHLORIDE 2 MG: 1 INJECTION, SOLUTION INTRAMUSCULAR; INTRAVENOUS at 10:07

## 2023-07-10 RX ADMIN — KETOROLAC TROMETHAMINE 30 MG: 30 INJECTION, SOLUTION INTRAMUSCULAR; INTRAVENOUS at 11:07

## 2023-07-10 RX ADMIN — ONDANSETRON 8 MG: 2 INJECTION INTRAMUSCULAR; INTRAVENOUS at 11:07

## 2023-07-10 RX ADMIN — LIDOCAINE HYDROCHLORIDE 100 MG: 20 INJECTION, SOLUTION INTRAVENOUS at 10:07

## 2023-07-10 RX ADMIN — DEXTROSE AND SODIUM CHLORIDE: 5; 450 INJECTION, SOLUTION INTRAVENOUS at 05:07

## 2023-07-10 RX ADMIN — MORPHINE SULFATE 4 MG: 4 INJECTION INTRAVENOUS at 02:07

## 2023-07-10 RX ADMIN — PIPERACILLIN AND TAZOBACTAM 4.5 G: 4; .5 INJECTION, POWDER, LYOPHILIZED, FOR SOLUTION INTRAVENOUS; PARENTERAL at 06:07

## 2023-07-10 RX ADMIN — ACETAMINOPHEN 1000 MG: 10 INJECTION, SOLUTION INTRAVENOUS at 10:07

## 2023-07-10 RX ADMIN — SODIUM CHLORIDE, SODIUM LACTATE, POTASSIUM CHLORIDE, AND CALCIUM CHLORIDE: .6; .31; .03; .02 INJECTION, SOLUTION INTRAVENOUS at 10:07

## 2023-07-10 RX ADMIN — SUGAMMADEX 200 MG: 100 INJECTION, SOLUTION INTRAVENOUS at 11:07

## 2023-07-10 NOTE — PLAN OF CARE
07/10/23 0201   Admission   Initial VN Admission Questions Complete   Communication Issues? None   Shift   Virtual Nurse - Rounding Complete   Pain Management Interventions quiet environment facilitated;relaxation techniques promoted   Virtual Nurse - Patient Verbalized Approval Of VN Rounding;Camera Use   Type of Frequent Check   Type Patient Rounds   Safety/Activity   Patient Rounds call light in patient/parent reach;bed wheels locked;bed in low position;clutter free environment maintained;ID band on;visualized patient;placement of personal items at bedside   Safety Promotion/Fall Prevention assistive device/personal item within reach;bed alarm set;room near unit station;/camera at bedside;side rails raised x 2;instructed to call staff for mobility;medications reviewed;Fall Risk reviewed with patient/family;nonskid shoes/socks when out of bed   Safety Precautions emergency equipment at bedside   Safety Bands on Patient Fall Risk Band   Activity Management Ambulated in room - L4   Activity Assistance Provided assistance, stand-by   Positioning   Body Position position changed independently   Head of Bed (HOB) Positioning HOB elevated   Positioning/Transfer Devices pillows;in use    VN cued into room to complete admit assessment. VIP model introduced; VN working alongside bedside treatment team.  Plan of care reviewed with patient. Patient informed of fall risk, fall precautions, call light within reach, side rails x2 elevated. Patient notified to ask staff for assistance. Patient verbalized complete understanding. Time allowed for questions. Will continue to monitor and intervene as needed.

## 2023-07-10 NOTE — H&P
[facility] Surg  General Surgery  History & Physical    Patient Name: Priya Conrad  MRN: [id number]  Admission Date: 2023  Attending Physician: Ki De La Fuente MD   Primary Care Provider: Luz Masters MD    Patient information was obtained from patient, past medical records and ER records.     Subjective:     Chief Complaint/Reason for Admission: Abdominal pain    History of Present Illness: Priya Conrad is a 37 y.o. female with medical problems including IBS who presents as a transfer for evaluation for appendectomy after imaging demonstrated appendicolith with associated inflammatory changes consistent with appendicitis.  Ms. Conrad states she developed abdominal pain Saturday evening, initally periumbilical, but later migrated to the RLQ. She endorses nausea, but denies vomiting and was afebrile at home. She denies any changes in bowel habits, notably with constipation at baseline; she is passing flatus.     Prior surgical history significant for abdominoplasty, c/s, and tubal ligation.       No current facility-administered medications on file prior to encounter.     Current Outpatient Medications on File Prior to Encounter   Medication Sig    amLODIPine (NORVASC) 5 MG tablet Take 5 mg by mouth once daily.    gabapentin (NEURONTIN) 100 MG capsule     gabapentin (NEURONTIN) 300 MG capsule     lactulose (CHRONULAC) 20 gram/30 mL Soln Take 30 mLs (20 g total) by mouth 2 (two) times daily.    linaCLOtide (LINZESS) 290 mcg Cap capsule Take 1 capsule (290 mcg total) by mouth before breakfast.    prucalopride (MOTEGRITY) 2 mg Tab Take  1 tablet (2 mg) by mouth once daily at 6am.    rimegepant (NURTEC) 75 mg odt Take 75 mg by mouth once as needed for Migraine.       Review of patient's allergies indicates:  No Known Allergies    Past Medical History:   Diagnosis Date    Anxiety     Heart murmur     Hypertension     Migraine      Past Surgical History:   Procedure Laterality Date     SECTION       COLONOSCOPY  10/20/2021    COLONOSCOPY N/A 10/20/2021    Procedure: COLONOSCOPY 2 day golytely;  Surgeon: Zheng Woodruff MD;  Location: [facility];  Service: Endoscopy;  Laterality: N/A;    ESOPHAGOGASTRODUODENOSCOPY N/A 10/20/2021    Procedure: EGD (ESOPHAGOGASTRODUODENOSCOPY);  Surgeon: Zheng Woodruff MD;  Location: [facility] ENDO;  Service: Endoscopy;  Laterality: N/A;    TUBAL LIGATION      [facility]      UPPER GASTROINTESTINAL ENDOSCOPY  10/20/2021     Family History       Problem Relation (Age of Onset)    Breast cancer Paternal Aunt    Heart failure Maternal Grandmother, Maternal Grandfather    Hypertension Mother          Tobacco Use    Smoking status: Never    Smokeless tobacco: Never   Substance and Sexual Activity    Alcohol use: Not Currently     Comment: socially    Drug use: No    Sexual activity: Not on file     Review of Systems   Constitutional:  Negative for chills and fever.   HENT:  Negative for congestion, sinus pain, sneezing and sore throat.    Respiratory:  Negative for cough and shortness of breath.    Cardiovascular:  Negative for chest pain.   Gastrointestinal:  Positive for abdominal pain, constipation and nausea. Negative for blood in stool, diarrhea and vomiting.   Genitourinary:  Negative for difficulty urinating and dysuria.   Neurological:  Positive for headaches. Negative for dizziness and seizures.   Objective:     Vital Signs (Most Recent):  Temp: 98.4 °F (36.9 °C) (07/10/23 0716)  Pulse: 80 (07/10/23 0716)  Resp: 17 (07/10/23 0716)  BP: 124/73 (07/10/23 0716)  SpO2: 99 % (07/10/23 0716) Vital Signs (24h Range):  Temp:  [98.4 °F (36.9 °C)-100.4 °F (38 °C)] 98.4 °F (36.9 °C)  Pulse:  [] 80  Resp:  [17-20] 17  SpO2:  [98 %-100 %] 99 %  BP: (112-144)/(66-86) 124/73     Weight: 89.4 kg (197 lb 3.2 oz)  Body mass index is 32.82 kg/m².     Physical Exam  Vitals and nursing note reviewed.   Constitutional:       Appearance: Normal appearance.   HENT:      Head: Normocephalic and  atraumatic.   Eyes:      General: No scleral icterus.     Extraocular Movements: Extraocular movements intact.   Cardiovascular:      Rate and Rhythm: Normal rate and regular rhythm.   Pulmonary:      Effort: Pulmonary effort is normal. No respiratory distress.   Abdominal:      General: There is no distension.      Palpations: Abdomen is soft.      Tenderness: There is abdominal tenderness. There is rebound. There is no guarding.      Comments: Focal tenderness in the right lower quadrant   Skin:     General: Skin is warm and dry.   Neurological:      General: No focal deficit present.      Mental Status: She is alert and oriented to person, place, and time.          I have reviewed all pertinent lab results within the past 24 hours.    Significant Diagnostics:  I have reviewed all pertinent imaging results/findings within the past 24 hours.      Assessment/Plan:     * Acute appendicitis  Priya Conrad is a 37 y.o. female with medical problems including HTN, obesity and chronic migraines who presents as a transfer from OSH with a two day history of migratory abdominal pain (periumbilical to RLQ) with imaging consistent with acute appendicitis.    -Will plan for laparoscopic appendectomy today, 7/10/23  -NPO, mIVF  -Zosyn  -MMPC  -PRN anti-emetics    Dispo: Likely home today vs tomorrow      VTE Risk Mitigation (From admission, onward)      None            Nav Jacobs MD  General Surgery  [facility]      Pt seen and examined.  Agree with note and plan.    Ki De La Fuente M.D., F.A.C.S.  [facility] and General Surgery  [facility]

## 2023-07-10 NOTE — ED NOTES
Assisted patient back to room from the restroom. Patient unable le to stand completely straight r/t pain. Informed that we are just waiting on [facility] to transport her to [place]

## 2023-07-10 NOTE — PLAN OF CARE
Patient came back from surgery, when she was trying to seat out of bed she felt like passing-out. Dr De La Fuente was informed and order for contionus fluid was put up @ 125ml/hr

## 2023-07-10 NOTE — TRANSFER OF CARE
"Anesthesia Transfer of Care Note    Patient: Priya Conrad    Procedure(s) Performed: Procedure(s) (LRB):  APPENDECTOMY, LAPAROSCOPIC (N/A)    Patient location: PACU    Anesthesia Type: general    Transport from OR: Transported from OR on 6-10 L/min O2 by face mask with adequate spontaneous ventilation    Post pain: adequate analgesia    Post assessment: no apparent anesthetic complications and tolerated procedure well    Post vital signs: stable    Level of consciousness: awake, alert and oriented    Nausea/Vomiting: no nausea/vomiting    Complications: none    Transfer of care protocol was followed      Last vitals:   Visit Vitals  /73   Pulse 80   Temp 36.9 °C (98.4 °F)   Resp 17   Ht 5' 5" (1.651 m)   Wt 89.4 kg (197 lb 3.2 oz)   LMP 06/20/2023 (Exact Date)   SpO2 99%   Breastfeeding No   BMI 32.82 kg/m²     "

## 2023-07-10 NOTE — ASSESSMENT & PLAN NOTE
Priya Conrad is a 37 y.o. female with medical problems including HTN, obesity and chronic migraines who presents as a transfer from OSH with a two day history of migratory abdominal pain (periumbilical to RLQ) with imaging consistent with acute appendicitis.    -Will plan for laparoscopic appendectomy today, 7/10/23  -NPO, [place]  -Zosyn  -MMPC  -PRN anti-emetics    Dispo: Likely home today vs tomorrow

## 2023-07-10 NOTE — PLAN OF CARE
went to meet with patient. Patient off floor for procedure. Dr. De La Fuente follow-up noted to be scheduled.     went to meet with patient after she returned from her procedure. Patient sleepy. Facesheet information updated. She reports she is independent, no needs anticipated at discharge. She was informed post-op follow-up scheduled. Patient encouraged to call with any questions or concerns.  will continue to follow patient through transitions of care and assist with any discharge needs.     Patient Contacts    Name Relation Home Work Mobile   Carlos Acuna Spouse [phone]     Future Appointments   Date Time Provider Department Center   7/21/2023  2:20 PM KOKO Naylor MD [facility] COLON [address]   7/21/2023  3:45 PM Ariane Parker MD [facility] OBGYN [facility]   7/24/2023  2:00 PM Ki De La Fuente MD [facility] GENSUR [facility]   7/28/2023 11:00 AM Gracy Doe PT, BCB-PMD [facility] ARTI [facility]   8/1/2023  8:00 AM [facility] MAMMO1 [facility] MAMMO [facility]   8/11/2023  8:00 AM Gracy Doe PT, BCB-PMD [facility]       07/10/23 1439   Discharge Assessment   Assessment Type Discharge Planning Brief Assessment   Confirmed/corrected address, phone number and insurance Yes   Confirmed Demographics Correct on Facesheet   Source of Information patient   Facility Arrived From: Home   Do you expect to return to your current living situation? Yes   Prior to hospitilization cognitive status: Alert/Oriented   Current cognitive status: Alert/Oriented   Equipment Currently Used at Home none   Do you have prescription coverage? Yes   Do you have any problems affording any of your prescribed medications? TBD   How do you get to doctors appointments? car, drives self;family or friend will provide   Are you on dialysis? No   Do you take coumadin? No   Discharge Plan A Home   Discharge Plan B Home with family   DME Needed Upon Discharge  none   Discharge Plan discussed  with: Patient   Transition of Care Barriers None     Donya Salas RN    [phone]

## 2023-07-10 NOTE — OP NOTE
PATIENT: Priya Conrad    MRN: [id number]    Date of Procedure:  07/10/2023    Surgeon: Ki De La Fuente M.D.    Assistant: Nav Jacobs M.D.    Procedure: Laparoscopic Appendectomy    Pre-Operative Diagnosis: Acute appendicitis     Post-Operative Diagnosis: same    Anesthesia: General    Specimen: Appendix    Complications: none    Estimated Blood Loss (EBL): minimal    INDICATIONS: The patient is a 37 y.o. female  who presents to the emergency room with clinical and radiologic evidence of acute appendicitis. The patient was counseled on their options for treatment and they expressed the desire to proceed with surgical intervention. The risks of the procedure were described to the patient including bleeding, infection, pain, scarring, wound complications including hernias, injury to local structures, potential need for further surgery, perioperative cardiac/pulmonary/thromboembolic events, and possible death.    PROCEDURE:  After surgical consent was obtained, the patient was transported to the operative theater and onto the operating room table in the supine position.  General endotracheal anesthesia was administered without difficulty.  Perioperative antibiotics were given and a time-out was performed in order to ensure the proper patient and procedure.  The patient's abdomen was prepped and draped in a standard sterile fashion.  An incision was made in the skin around the umbilicus and carried down bluntly to the fascia.  The fascia was sharply divided and the abdominal cavity was entered bluntly.  A trocar was inserted without difficulty and the abdomen was insufflated.  Two additional trocars were inserted under direct visualization and without issue. The patient was placed in the Trendelenburg position with the left side down.  The appendix was identified and found to be acutely inflamed and without evidence of rupture.  There was no purulent fluid.  The appendix was bluntly elevated off the retroperitoneum.   The base of the appendix was identified and a window was made in the mesoappendix.  The appendiceal base was then stapled and divided using at Endo stapler with a tissue load.  The mesoappendix was similarly divided with a ligasure device  The appendix was placed in a specimen bag and removed from the field.  The right lower quadrant was irrigated out with sterile saline.  The staple line was intact.  We desufflated the abdomen under direct visualization and removed all trocars.  The fascial defect at the periumbilical incision was closed using a figure-of-eight 0 Vicryl suture.  The incisions were irrigated out with sterile saline and reinjected with local anesthetic.  The skin openings were closed using interrupted Monocryl stitches and covered with a sterile Band-Aid.  At the end of the procedure the instrument, lap, and needle counts were correct.  The patient was awoken from anesthesia having tolerated the procedure without difficulty and was returned to the PACU in a stable condition.  Any available family was updated and all questions were answered.    Ki De La Fuente M.D., F.A.C.S.  [facility] and General Surgery  [facility]

## 2023-07-10 NOTE — ED PROVIDER NOTES
ED Provider Note - 2023    History     Chief Complaint   Patient presents with    Abdominal Pain     Reports to ED c c/o RLQ abd pain since yesterday. +Intermittent sharp pain. +Nausea. Denies fever, diarrhea, or vomiting. Hx of IBS.      Patient currently presents with complaint of abdominal pain.  Onset of this event was first noted yesterday.  This discomfort is reported to be primarily RLQ.  This discomfort is described as a aching sensation.  Patient notes constipation associated with this process though this is somewhat of a chronic problem.  Patient notably denies dysuria, urinary frequency, and vaginal discharge. This is not a recurring process.  Notably patient does have history of IBS for which she takes lactulose and Linzess.          Review of patient's allergies indicates:  No Known Allergies  Past Medical History:   Diagnosis Date    Anxiety     Heart murmur     Hypertension     Migraine      Past Surgical History:   Procedure Laterality Date     SECTION      COLONOSCOPY  10/20/2021    COLONOSCOPY N/A 10/20/2021    Procedure: COLONOSCOPY 2 day golytely;  Surgeon: Zheng Woodruff MD;  Location: [facility];  Service: Endoscopy;  Laterality: N/A;    ESOPHAGOGASTRODUODENOSCOPY N/A 10/20/2021    Procedure: EGD (ESOPHAGOGASTRODUODENOSCOPY);  Surgeon: Zheng Woodruff MD;  Location: [facility];  Service: Endoscopy;  Laterality: N/A;    TUBAL LIGATION      [facility]      UPPER GASTROINTESTINAL ENDOSCOPY  10/20/2021     Family History   Problem Relation Age of Onset    Hypertension Mother     Breast cancer Paternal Aunt     Heart failure Maternal Grandmother     Heart failure Maternal Grandfather      Social History     Tobacco Use    Smoking status: Never    Smokeless tobacco: Never   Substance Use Topics    Alcohol use: Not Currently     Comment: socially    Drug use: No     Review of Systems   Constitutional:  Negative for chills and fever.   HENT:  Negative for congestion and rhinorrhea.   "  Respiratory:  Negative for cough and shortness of breath.    Cardiovascular:  Negative for chest pain and palpitations.   Gastrointestinal:  Positive for abdominal pain, constipation (chronic) and nausea. Negative for diarrhea and vomiting.   Genitourinary:  Negative for difficulty urinating and dysuria.   Skin:  Negative for color change and rash.   Neurological:  Negative for dizziness and light-headedness.   Hematological:  Negative for adenopathy. Does not bruise/bleed easily.     Physical Exam     Initial Vitals [07/09/23 1910]   BP Pulse Resp Temp SpO2   134/82 102 19 (!) 100.4 °F (38 °C) 100 %      MAP       --         Vitals:    07/09/23 1910 07/09/23 2239 07/09/23 2317 07/09/23 2332   BP: 134/82  (!) 144/86    Pulse: 102  94    Resp: 19 20 20 20   Temp: (!) 100.4 °F (38 °C)  99 °F (37.2 °C)    TempSrc: Oral  Oral    SpO2: 100%      Weight: 89.4 kg (197 lb)      Height: 5' 5" (1.651 m)       07/10/23 0000 07/10/23 0035   BP: 117/66 112/72   Pulse: 94 99   Resp: 20 20   Temp: 99 °F (37.2 °C)    TempSrc: Oral    SpO2:  98%   Weight:     Height:       Physical Exam    Nursing note and vitals reviewed.  Constitutional: She appears well-developed and well-nourished. She is not diaphoretic. No distress.   HENT:   Head: Normocephalic and atraumatic.   Nose: Nose normal.   Mouth/Throat: Oropharynx is clear and moist.   Eyes: Conjunctivae are normal. No scleral icterus.   Cardiovascular:  Normal rate, regular rhythm, normal heart sounds and intact distal pulses.           Pulmonary/Chest: Breath sounds normal. No respiratory distress.   Abdominal: Abdomen is soft. There is no abdominal tenderness.   Musculoskeletal:         General: No edema. Normal range of motion.     Neurological: She is alert and oriented to person, place, and time. She has normal strength.   Skin: Skin is warm and dry.       ED Course   Procedures                   MDM  Differential Diagnoses   Based on available history, the working " differential diagnoses considered during this evaluation include but are not limited to appendicitis, enteritis/colitis, ureterolithiasis.      LABS     Labs Reviewed   CBC W/ AUTO DIFFERENTIAL - Abnormal; Notable for the following components:       Result Value    Mono # 1.4 (*)     Lymph % 16.2 (*)     All other components within normal limits   COMPREHENSIVE METABOLIC PANEL - Abnormal; Notable for the following components:    Potassium 3.2 (*)     BUN 6 (*)     All other components within normal limits   URINALYSIS, REFLEX TO URINE CULTURE - Abnormal; Notable for the following components:    Leukocytes, UA Trace (*)     All other components within normal limits    Narrative:     Preferred Collection Type->Urine, Clean Catch  Specimen Source->Urine   URINALYSIS MICROSCOPIC - Abnormal; Notable for the following components:    WBC, UA 20 (*)     All other components within normal limits    Narrative:     Preferred Collection Type->Urine, Clean Catch  Specimen Source->Urine   CULTURE, URINE   LIPASE   POCT URINE PREGNANCY     All available results from the labs ordered were independently reviewed.  CBC unremarkable, CMP notable for mild hypokalemia at 3.2, Lipase RESULT : normal, UAR notable for pyuria with 20 WBC, and UPT negative    Imaging     Imaging Results              CT Abdomen Pelvis With Contrast (Final result)  Result time 07/09/23 22:06:28      Final result by Tim Whalen MD (07/09/23 22:06:28)                   Impression:      Findings suggesting acute distal appendicitis with appendicolith noted.  No evidence of perforation, abscess, or obstruction.    Punctate nonobstructing left renal stone.    Findings discussed with Dr. Bello at 22:05 on 07/09/2023.      Electronically signed by: Tim Whalen  Date:    07/09/2023  Time:    22:06               Narrative:    EXAMINATION:  CT ABDOMEN PELVIS WITH CONTRAST    CLINICAL HISTORY:  RLQ abdominal pain (Age >= 14y);    TECHNIQUE:  Low dose  axial images, sagittal and coronal reformations were obtained from the lung bases to the pubic symphysis following the IV administration of 100 mL of Omnipaque 350 .  Oral contrast was not given.    All CT scans at this location are performed using dose optimization techniques including the following: Automated exposure control; adjustment of the mA and/or kv; use of iterative reconstruction technique.    COMPARISON:  None.    FINDINGS:  Liver is normal in size, contour, and enhancement.  No focal lesion.  Portal venous system appears patent.    Gallbladder and biliary tree are unremarkable.  No cholelithiasis or ductal dilatation.    Spleen is normal in size and enhancement.  No focal lesion.    Pancreas is normal in size, contour, and enhancement.  No focal lesion or ductal dilatation.    Adrenal glands are unremarkable.    Punctate nonobstructing stone in the superior pole the left kidney.  Kidneys are otherwise normal in size and enhancement.  No focal lesion, additional urolithiasis, or hydroureteronephrosis.    There is mild focal dilatation of the distal appendix with wall thickening and surrounding fat stranding.  Small distal appendicolith also noted.  No perforation or abscess.  Cecum and terminal ileum are unremarkable.  Remainder of the gastrointestinal tract is unremarkable.  No evidence of obstruction or mass lesion.    Urinary bladder is unremarkable.    Uterine fibroid noted.  Numerous new both the in cysts noted.    Major vessels of the abdomen and pelvis appear within normal limits.    No suspicious lymphadenopathy.    No significant ascites, pneumoperitoneum, or peritoneal implant.    No acute bony abnormality.  No aggressive lytic or blastic lesion.    Visualized lower lungs are clear.                                         EKG        ED Management/Discussion     Medications   dextrose 5 % and 0.45 % NaCl infusion (1,000 mLs Intravenous New Bag 7/9/23 6165)   iohexoL (OMNIPAQUE 350) injection  100 mL (100 mLs Intravenous Given 7/9/23 2154)   piperacillin-tazobactam (ZOSYN) 4.5 g in dextrose 5 % in water (D5W) 5 % 100 mL IVPB (MB+) (0 g Intravenous Stopped 7/9/23 2300)   morphine injection 2 mg (2 mg Intravenous Given 7/9/23 2239)   morphine injection 2 mg (2 mg Intravenous Given 7/9/23 2332)                   The patient's list of active medical problems, social history, medications, and allergies as documented per RN staff has been reviewed.             All available findings were reviewed with the patient/family in detail along with the indications for hospitalization in order to receive IV antibiotics, IVF, and SURGICAL evaluation.  All remaining questions and concerns were addressed at this time and the patient/family communicates understanding and agrees to proceed accordingly.  Similarly, all pertinent details of the encounter were discussed with Dr De La Fuente who agrees to receive the patient at [facility] for further care as outlined above.  The patient will be transferred by EMS secondary to a need for ongoing IVF and IV ABX en route.    Referrals:  No orders of the defined types were placed in this encounter.      CLINICAL IMPRESSION    ICD-10-CM ICD-9-CM   1. Acute appendicitis with localized peritonitis, without perforation, abscess, or gangrene  K35.30 540.9          ED Disposition Condition    Observation Stable               Jona Bello MD  07/10/23 0142

## 2023-07-10 NOTE — ANESTHESIA PREPROCEDURE EVALUATION
07/10/2023  Priya oCnrad is a 37 y.o., female with acute appendicitis for Lap vs Open Appendectomy      Patient Active Problem List   Diagnosis    Chronic migraine    Medication overuse headache    Muscle tightness    Cervical muscle pain    Bilateral shoulder pain    Poor posture    Essential hypertension    BMI 33.0-33.9,adult    GERD (gastroesophageal reflux disease)    Pelvic floor dysfunction       Past Medical History:   Diagnosis Date    Anxiety     Heart murmur     Hypertension     Migraine        ECHO: Results for orders placed during the hospital encounter of 02/01/21    Echo Color Flow Doppler? Yes    Interpretation Summary  · The left ventricle is normal in size with concentric remodeling and normal systolic function. The estimated ejection fraction is 55%  · Grade I left ventricular diastolic dysfunction.  · Normal right ventricular size with normal right ventricular systolic function.  · Mild tricuspid regurgitation.  · Normal central venous pressure (3 mmHg).  · The estimated PA systolic pressure is 28 mmHg.      Body mass index is 32.82 kg/m².    Tobacco Use: Low Risk     Smoking Tobacco Use: Never    Smokeless Tobacco Use: Never    Passive Exposure: Not on file       Social History     Substance and Sexual Activity   Drug Use No        Alcohol Use: Not on file       Review of patient's allergies indicates:  No Known Allergies      Airway:  No value filed.         Pre-op Assessment    I have reviewed the Patient Summary Reports.     I have reviewed the Nursing Notes. I have reviewed the NPO Status.   I have reviewed the Medications.     Review of Systems  Anesthesia Hx:  No problems with previous Anesthesia  History of prior surgery of interest to airway management or planning: Denies Family Hx of Anesthesia complications.   Denies Personal Hx of Anesthesia complications.    Cardiovascular:   Hypertension    Hepatic/GI:   PUD, GERD    Neurological:   Headaches  Dx of Headaches, Migraine Headache   Endocrine:  Obesity / BMI > 30         Anesthesia Plan  Type of Anesthesia, risks & benefits discussed:    Anesthesia Type: Gen ETT  Intra-op Monitoring Plan: Standard ASA Monitors  Post Op Pain Control Plan: multimodal analgesia and IV/PO Opioids PRN  Induction:  IV  Informed Consent: Informed consent signed with the Patient and all parties understand the risks and agree with anesthesia plan.  All questions answered. Patient consented to blood products? No  ASA Score: 2    Ready For Surgery From Anesthesia Perspective.     .

## 2023-07-10 NOTE — PLAN OF CARE
Problem: Adult Inpatient Plan of Care  Goal: Plan of Care Review  Outcome: Ongoing, Progressing  Flowsheets (Taken 7/10/2023 0607)  Plan of Care Reviewed With: patient     Problem: Constipation  Goal: Effective Bowel Elimination  Outcome: Ongoing, Progressing     Problem: Pain Acute  Goal: Acceptable Pain Control and Functional Ability  Outcome: Ongoing, Progressing  Intervention: Prevent or Manage Pain  Flowsheets (Taken 7/10/2023 0607)  Bowel Elimination Promotion: adequate fluid intake promoted  Medication Review/Management: medications reviewed     Problem: Fall Injury Risk  Goal: Absence of Fall and Fall-Related Injury  Outcome: Ongoing, Progressing     Problem: Infection  Goal: Absence of Infection Signs and Symptoms  Outcome: Ongoing, Progressing  Intervention: Prevent or Manage Infection  Flowsheets (Taken 7/10/2023 0607)  Infection Management: aseptic technique maintained

## 2023-07-10 NOTE — HPI
Priya Conrad is a 37 y.o. female with medical problems including IBS who presents as a transfer for evaluation for appendectomy after imaging demonstrated appendicolith with associated inflammatory changes consistent with appendicitis.  Ms. Conrad states she developed abdominal pain Saturday evening, initally periumbilical, but later migrated to the RLQ. She endorses nausea, but denies vomiting and was afebrile at home. She denies any changes in bowel habits, notably with constipation at baseline; she is passing flatus.     Prior surgical history significant for abdominoplasty, c/s, and tubal ligation.

## 2023-07-10 NOTE — NURSING
Patient not in pain, had regular adult diet, she was disharged during the shift in peripheral line was out. AVS review by the vitual nurse, home medication were delivered.

## 2023-07-10 NOTE — NURSING
Patient is back to the roque conscious and alert. She hard 3 lap sites , covered with dermabond . Site of surgery is clean and intact, no distress

## 2023-07-10 NOTE — ANESTHESIA PROCEDURE NOTES
Intubation    Date/Time: 7/10/2023 10:38 AM  Performed by: Lucie Kaur CRNA  Authorized by: Nathan Reddy MD     Intubation:     Induction:  Intravenous    Intubated:  Postinduction    Mask Ventilation:  Easy mask    Attempts:  1    Attempted By:  CRNA    Method of Intubation:  Video laryngoscopy    Blade:  Myers 3    Laryngeal View Grade: Grade I - full view of cords      Difficult Airway Encountered?: No      Complications:  None    Airway Device:  Oral endotracheal tube    Airway Device Size:  7.0    Style/Cuff Inflation:  Cuffed    Tube secured:  21    Secured at:  The lips    Placement Verified By:  Capnometry    Complicating Factors:  None    Findings Post-Intubation:  BS equal bilateral and atraumatic/condition of teeth unchanged

## 2023-07-10 NOTE — SUBJECTIVE & OBJECTIVE
No current facility-administered medications on file prior to encounter.     Current Outpatient Medications on File Prior to Encounter   Medication Sig    amLODIPine (NORVASC) 5 MG tablet Take 5 mg by mouth once daily.    gabapentin (NEURONTIN) 100 MG capsule     gabapentin (NEURONTIN) 300 MG capsule     lactulose (CHRONULAC) 20 gram/30 mL Soln Take 30 mLs (20 g total) by mouth 2 (two) times daily.    linaCLOtide (LINZESS) 290 mcg Cap capsule Take 1 capsule (290 mcg total) by mouth before breakfast.    prucalopride (MOTEGRITY) 2 mg Tab Take  1 tablet (2 mg) by mouth once daily at 6am.    rimegepant (NURTEC) 75 mg odt Take 75 mg by mouth once as needed for Migraine.       Review of patient's allergies indicates:  No Known Allergies    Past Medical History:   Diagnosis Date    Anxiety     Heart murmur     Hypertension     Migraine      Past Surgical History:   Procedure Laterality Date     SECTION      COLONOSCOPY  10/20/2021    COLONOSCOPY N/A 10/20/2021    Procedure: COLONOSCOPY 2 day golytely;  Surgeon: Zheng Woodruff MD;  Location: [facility];  Service: Endoscopy;  Laterality: N/A;    ESOPHAGOGASTRODUODENOSCOPY N/A 10/20/2021    Procedure: EGD (ESOPHAGOGASTRODUODENOSCOPY);  Surgeon: Zheng Woodruff MD;  Location: [facility];  Service: Endoscopy;  Laterality: N/A;    TUBAL LIGATION      [facility]      UPPER GASTROINTESTINAL ENDOSCOPY  10/20/2021     Family History       Problem Relation (Age of Onset)    Breast cancer Paternal Aunt    Heart failure Maternal Grandmother, Maternal Grandfather    Hypertension Mother          Tobacco Use    Smoking status: Never    Smokeless tobacco: Never   Substance and Sexual Activity    Alcohol use: Not Currently     Comment: socially    Drug use: No    Sexual activity: Not on file     Review of Systems   Constitutional:  Negative for chills and fever.   HENT:  Negative for congestion, sinus pain, sneezing and sore throat.    Respiratory:  Negative for cough and shortness  of breath.    Cardiovascular:  Negative for chest pain.   Gastrointestinal:  Positive for abdominal pain, constipation and nausea. Negative for blood in stool, diarrhea and vomiting.   Genitourinary:  Negative for difficulty urinating and dysuria.   Neurological:  Positive for headaches. Negative for dizziness and seizures.   Objective:     Vital Signs (Most Recent):  Temp: 98.4 °F (36.9 °C) (07/10/23 0716)  Pulse: 80 (07/10/23 0716)  Resp: 17 (07/10/23 0716)  BP: 124/73 (07/10/23 0716)  SpO2: 99 % (07/10/23 0716) Vital Signs (24h Range):  Temp:  [98.4 °F (36.9 °C)-100.4 °F (38 °C)] 98.4 °F (36.9 °C)  Pulse:  [] 80  Resp:  [17-20] 17  SpO2:  [98 %-100 %] 99 %  BP: (112-144)/(66-86) 124/73     Weight: 89.4 kg (197 lb 3.2 oz)  Body mass index is 32.82 kg/m².     Physical Exam  Vitals and nursing note reviewed.   Constitutional:       Appearance: Normal appearance.   HENT:      Head: Normocephalic and atraumatic.   Eyes:      General: No scleral icterus.     Extraocular Movements: Extraocular movements intact.   Cardiovascular:      Rate and Rhythm: Normal rate and regular rhythm.   Pulmonary:      Effort: Pulmonary effort is normal. No respiratory distress.   Abdominal:      General: There is no distension.      Palpations: Abdomen is soft.      Tenderness: There is abdominal tenderness. There is rebound. There is no guarding.      Comments: Focal tenderness in the right lower quadrant   Skin:     General: Skin is warm and dry.   Neurological:      General: No focal deficit present.      Mental Status: She is alert and oriented to person, place, and time.          I have reviewed all pertinent lab results within the past 24 hours.    Significant Diagnostics:  I have reviewed all pertinent imaging results/findings within the past 24 hours.

## 2023-07-10 NOTE — NURSING TRANSFER
Nursing Transfer Note      Report called to Humberto on 5th floor.  Patient in stable condition.  States pain is resolved.  She has 3 lap sites with dermabond that are clean dry and intact.  No distress, respirations even and unlabored.

## 2023-07-11 VITALS
RESPIRATION RATE: 20 BRPM | OXYGEN SATURATION: 99 % | BODY MASS INDEX: 35.88 KG/M2 | DIASTOLIC BLOOD PRESSURE: 79 MMHG | HEIGHT: 65 IN | WEIGHT: 215.38 LBS | TEMPERATURE: 98 F | HEART RATE: 72 BPM | SYSTOLIC BLOOD PRESSURE: 136 MMHG

## 2023-07-11 LAB
BACTERIA UR CULT: NORMAL
BACTERIA UR CULT: NORMAL

## 2023-07-11 PROCEDURE — G0378 HOSPITAL OBSERVATION PER HR: HCPCS

## 2023-07-11 PROCEDURE — 63600175 PHARM REV CODE 636 W HCPCS

## 2023-07-11 PROCEDURE — 25000003 PHARM REV CODE 250

## 2023-07-11 PROCEDURE — 96366 THER/PROPH/DIAG IV INF ADDON: CPT

## 2023-07-11 RX ORDER — KETOROLAC TROMETHAMINE 10 MG/1
10 TABLET, FILM COATED ORAL EVERY 6 HOURS PRN
Qty: 12 TABLET | Refills: 0 | Status: SHIPPED | OUTPATIENT
Start: 2023-07-11 | End: 2023-07-14

## 2023-07-11 RX ORDER — OXYCODONE AND ACETAMINOPHEN 5; 325 MG/1; MG/1
1 TABLET ORAL EVERY 6 HOURS PRN
Qty: 12 TABLET | Refills: 0 | Status: SHIPPED | OUTPATIENT
Start: 2023-07-11 | End: 2023-07-18

## 2023-07-11 RX ADMIN — PIPERACILLIN AND TAZOBACTAM 4.5 G: 4; .5 INJECTION, POWDER, LYOPHILIZED, FOR SOLUTION INTRAVENOUS; PARENTERAL at 06:07

## 2023-07-11 NOTE — PLAN OF CARE
Patient is AAOx4. No complaints of N/V. Complaints of incisional pain, Prn medication given. 1 bag of D5 and .45 NS administered. Incision sites clean, dry, and intact. Safety maintained. Instructed to call for assistance.

## 2023-07-11 NOTE — PROGRESS NOTES
[facility]                    Pharmacy       Discharge Medication Education    Patient ACCEPTED medication education. Pharmacy has provided education on the name, indication, and possible side effects of the medication(s) prescribed, using teach-back method.     The following medications have also been discussed, during this admission.        Medication List        START taking these medications      ketorolac 10 mg tablet  Commonly known as: TORADOL  Take 1 tablet (10 mg total) by mouth every 6 (six) hours as needed for Pain.     oxyCODONE-acetaminophen 5-325 mg per tablet  Commonly known as: PERCOCET  Take 1 tablet by mouth every 6 (six) hours as needed for Pain.            CONTINUE taking these medications      amLODIPine 5 MG tablet  Commonly known as: NORVASC     lactulose 20 gram/30 mL Soln  Commonly known as: CHRONULAC  Take 30 mLs (20 g total) by mouth 2 (two) times daily.     linaCLOtide 290 mcg Cap capsule  Commonly known as: LINZESS  Take 1 capsule (290 mcg total) by mouth before breakfast.            STOP taking these medications      gabapentin 100 MG capsule  Commonly known as: NEURONTIN     gabapentin 300 MG capsule  Commonly known as: NEURONTIN            ASK your doctor about these medications      MOTEGRITY 2 mg Tab  Generic drug: prucalopride  Take  1 tablet (2 mg) by mouth once daily at 6am.     NURTEC 75 mg odt  Generic drug: rimegepant               Where to Get Your Medications        You can get these medications from any pharmacy    Bring a paper prescription for each of these medications  ketorolac 10 mg tablet  oxyCODONE-acetaminophen 5-325 mg per tablet          Thank you  Eleonora Torres, PharmD  [phone]

## 2023-07-11 NOTE — PLAN OF CARE
AVS and educational attachments shared with patient via hospital room phone. Discussed thoroughly. Patient verbalized clear understanding using teach back method. Patient requesting work excuse letter. VN reached out to Dr De La Fuente via Secure chat. Notified bedside nurse of completion of education. At present no distress noted. Patient to be discharged via w/c with escort service and family with all of patient's belonings. Will cont to be available to patient and family and intervene prn.

## 2023-07-11 NOTE — ANESTHESIA POSTPROCEDURE EVALUATION
Anesthesia Post Evaluation    Patient: Priya Conrad    Procedure(s) Performed: Procedure(s) (LRB):  APPENDECTOMY, LAPAROSCOPIC (N/A)    Final Anesthesia Type: general      Patient location during evaluation: PACU  Patient participation: Yes- Able to Participate  Level of consciousness: awake and alert and oriented  Post-procedure vital signs: reviewed and stable  Pain management: adequate  Airway patency: patent    PONV status at discharge: No PONV  Anesthetic complications: no      Cardiovascular status: hemodynamically stable  Respiratory status: unassisted  Hydration status: euvolemic  Follow-up not needed.          Vitals Value Taken Time   /79 07/11/23 1105   Temp 36.9 °C (98.4 °F) 07/11/23 1105   Pulse 72 07/11/23 1105   Resp 20 07/11/23 1105   SpO2 99 % 07/11/23 1105         Event Time   Out of Recovery 07/10/2023 13:03:00         Pain/Melany Score: Pain Rating Prior to Med Admin: 6 (7/10/2023 10:44 PM)  Pain Rating Post Med Admin: 6 (7/10/2023  3:06 AM)  Melany Score: 9 (7/10/2023 12:45 PM)

## 2023-07-11 NOTE — PLAN OF CARE
D/C recs noted. Pt d/c remains clear from CM standpoint. SW have already informed staff in secure chat. Pt to have general surgery, OBGYN, and Colon follow up. Pharmacist will go over home medications and reasons for medications. VN and bedside nurse to reiterate final discharge instructions.       At time of discharge pt will be transported home by family.    DME at discharge: none  Home Health: none    Pt has follow up appointments added to AVS.         07/11/23 1240   Final Note   Assessment Type Final Discharge Note   Anticipated Discharge Disposition Home   What phone number can be called within the next 1-3 days to see how you are doing after discharge? [phone]   Hospital Resources/Appts/Education Provided Appointments scheduled and added to AVS   Post-Acute Status   Discharge Delays None known at this time       Future Appointments   Date Time Provider Department Center   7/21/2023  2:20 PM KOKO Naylor MD [facility] COLON [address]   7/21/2023  3:45 PM Ariane Parker MD [facility] OBGYN [facility]   7/24/2023  2:00 PM Ki De La Fuente MD [facility] GENSUR [facility]   7/28/2023 11:00 AM Gracy Doe, PT, BCB-PMD [facility]   8/1/2023  8:00 AM RVPH MAMMO1 [facility] MAMMO [facility]   8/11/2023  8:00 AM Gracy Doe PT, BCB-PMD [facility]     [facility] Case Management  [phone]

## 2023-07-11 NOTE — NURSING
Patient is c/o chest tightness and described pain as feeling full and gas like. Vitals are stable. Dr Ryder informed. PRN simethicone ordered.

## 2023-07-11 NOTE — PLAN OF CARE
SW met with pt at bedside to confirm appointments and assessment. SW to continue to support as needed. Pt confirmed having family to transport home.     SUSANA [facility]ston Case Management  [phone]

## 2023-07-12 NOTE — DISCHARGE SUMMARY
[facility]  Discharge Summary  General Surgery      Admit Date: 7/9/2023    Discharge Date: 7/11/2023  1:25 PM    Attending Physician/Discharge Provider: Ki De La Fuente M.D.    Reason for Admission: Appendicitis    Procedures Performed: Procedure(s) (LRB):  APPENDECTOMY, LAPAROSCOPIC (N/A)    Hospital Course:  Patient is a 37-year old female who presented with appendicitis.  She underwent surgery without issue and was discharged the next day.     Final Diagnoses:   Principal Problem: Acute appendicitis       Discharged Condition: Good    Disposition: Home or Self Care    Follow Up/Patient Instructions: Follow up with Dr. De La Fuente in 2 weeks.    Medications:  Reconciled Home Medications:      Medication List        START taking these medications      ketorolac 10 mg tablet  Commonly known as: TORADOL  Take 1 tablet (10 mg total) by mouth every 6 (six) hours as needed for Pain.     oxyCODONE-acetaminophen 5-325 mg per tablet  Commonly known as: PERCOCET  Take 1 tablet by mouth every 6 (six) hours as needed for Pain.            CONTINUE taking these medications      amLODIPine 5 MG tablet  Commonly known as: NORVASC  Take 5 mg by mouth once daily.     lactulose 20 gram/30 mL Soln  Commonly known as: CHRONULAC  Take 30 mLs (20 g total) by mouth 2 (two) times daily.     linaCLOtide 290 mcg Cap capsule  Commonly known as: LINZESS  Take 1 capsule (290 mcg total) by mouth before breakfast.            STOP taking these medications      gabapentin 100 MG capsule  Commonly known as: NEURONTIN     gabapentin 300 MG capsule  Commonly known as: NEURONTIN            ASK your doctor about these medications      MOTEGRITY 2 mg Tab  Generic drug: prucalopride  Take  1 tablet (2 mg) by mouth once daily at 6am.     NURTEC 75 mg odt  Generic drug: rimegepant  Take 75 mg by mouth once as needed for Migraine.            Discharge Procedure Orders   Diet Adult Regular     Ice to affected area     Lifting restrictions   Order  Comments: 20 lbs x 3 weeks     No dressing needed   Order Comments: Ok to shower in 24 hours, no bathing or swimming x 2 weeks             Ki De La Fuente M.D., F.A.C.S.  [facility] General Surgery  [facility]

## 2023-07-17 LAB
FINAL PATHOLOGIC DIAGNOSIS: NORMAL
GROSS: NORMAL
Lab: NORMAL

## 2023-07-19 ENCOUNTER — PATIENT MESSAGE (OUTPATIENT)
Dept: OBSTETRICS AND GYNECOLOGY | Facility: CLINIC | Age: 38
End: 2023-07-19
Payer: COMMERCIAL

## 2023-07-19 RX ORDER — FLUCONAZOLE 150 MG/1
150 TABLET ORAL
Qty: 2 TABLET | Refills: 0 | Status: SHIPPED | OUTPATIENT
Start: 2023-07-19 | End: 2023-07-23

## 2023-07-20 NOTE — PROGRESS NOTES
CRS Office Visit History and Physical    Referring Md:   Zheng Woodruff Md  [address]  [address]  [place] [address]    SUBJECTIVE:     Chief Complaint: defecatory dysfunction    History of Present Illness:  The patient is a new patient to this practice.   Course is as follows:  Patient is a 37 y.o. female presents with defecatory dysfunction.     Seen by GI for evaluation for constipation  Taking linzess 290 every morning. Not really helping.  trulance was effective, then stopped working   Tried motegrity - got bad headaches. Not taking anymore  Failed amitiza     Recurrent regimen of Linzess 290 mcg every day, she will not have any bowel movements unless she takes a laxative.  She complains of significant bloating, abdominal distention, pain, and inability to function normally secondary to her discomfort.  Thyroid function normal.    Past abdominal surgeries of prior  through a lower midline, abdominal plasty, and recent laparoscopic appendectomy.    No family hx of CRC in 1st degree.   Aunt - CRC    Workup:  Colonoscopy:  10/20/2021: Normal ileum and colon with redundant sigmoid colon  Defecogram 22:   - The anorectal angle measurements fall within range and there is expected increase in the angle on defecation.    - Small anterior rectocele.    - Small amount of residual after evacuation   CT abdomen pelvis 2023 reviewed demonstrates moderate amount of stool throughout the colon.    Last Colonoscopy:  10/20/2021:  Impression:            - The examined portion of the ileum was normal.                          - The entire examined colon is normal on direct                          and retroflexion views. Sigmoid colon is redundant.                          - No specimens collected.     Review of patient's allergies indicates:  No Known Allergies    Past Medical History:   Diagnosis Date    Anxiety     Heart murmur     Hypertension     Migraine      Past Surgical History:   Procedure  "Laterality Date     SECTION      COLONOSCOPY  10/20/2021    COLONOSCOPY N/A 10/20/2021    Procedure: COLONOSCOPY 2 day golytely;  Surgeon: Zheng Woodruff MD;  Location: [facility] ENDO;  Service: Endoscopy;  Laterality: N/A;    ESOPHAGOGASTRODUODENOSCOPY N/A 10/20/2021    Procedure: EGD (ESOPHAGOGASTRODUODENOSCOPY);  Surgeon: Zheng Woodruff MD;  Location: [facility] ENDO;  Service: Endoscopy;  Laterality: N/A;    LAPAROSCOPIC APPENDECTOMY N/A 7/10/2023    Procedure: APPENDECTOMY, LAPAROSCOPIC;  Surgeon: Ki De La Fuente MD;  Location: [facility] OR;  Service: General;  Laterality: N/A;    TUBAL LIGATION      Tummy Tuck      UPPER GASTROINTESTINAL ENDOSCOPY  10/20/2021     Family History   Problem Relation Age of Onset    Hypertension Mother     Breast cancer Paternal Aunt     Heart failure Maternal Grandmother     Heart failure Maternal Grandfather      Social History     Tobacco Use    Smoking status: Never    Smokeless tobacco: Never   Substance Use Topics    Alcohol use: Not Currently     Comment: socially    Drug use: No        Review of Systems:  Review of Systems   Constitutional:  Negative for chills, diaphoresis, fever, malaise/fatigue and weight loss.   HENT:  Negative for congestion.    Respiratory:  Negative for shortness of breath.    Cardiovascular:  Negative for chest pain and leg swelling.   Gastrointestinal:  Positive for abdominal pain and constipation. Negative for blood in stool, nausea and vomiting.   Genitourinary:  Negative for dysuria.   Musculoskeletal:  Negative for back pain and myalgias.   Skin:  Negative for rash.   Neurological:  Negative for dizziness and weakness.   Endo/Heme/Allergies:  Does not bruise/bleed easily.   Psychiatric/Behavioral:  Negative for depression.      OBJECTIVE:     Vital Signs (Most Recent)  /77 (BP Location: Left arm, Patient Position: Sitting, BP Method: Small (Automatic))   Pulse 93   Ht 5' 5" (1.651 m)   Wt 93.4 kg (205 lb 14.4 oz)   LMP 2023 (Exact " Date)   BMI 34.26 kg/m²     Physical Exam:  General: Black or  female in no distress   Neuro: alert and oriented x 4.  Moves all extremities.     HEENT: no icterus.  Trachea midline  Respiratory: respirations are even and unlabored  Cardiac: regular rate  Abdomen:  Prior abdominal plasty incision at her belt line healing well.  Laparoscopic incisions from prior appendectomy also healing well.  Extremities: Warm dry and intact  Skin: no rashes  Anorectal:  Deferred    Labs:  H&H 13 and 38.  Albumin 4.2.  Normal renal function.  Normal thyroid function.    Imaging:  CT abdomen pelvis 07/09/2023 reviewed demonstrates moderate amount of stool throughout the colon.  Acute appendicitis.      ASSESSMENT/PLAN:     Diagnoses and all orders for this visit:    Chronic idiopathic constipation  -     X-Ray Abdomen AP 1 View; Future  -     X-Ray Abdomen AP 1 View; Future  -     X-Ray Abdomen AP 1 View; Future  -     FL Small Bowel Follow Through; Future        37 y.o. female with significant constipation.  Defecography shows that the rectum was functional to liquids.  Will plan for further evaluation with a small-bowel follow-through to ensure that her small bowel functions normally as well as a Sitz marker study to look at the mobility of her colon.  Discussed that she may benefit from total abdominal colectomy for medically refractory constipation.  I will follow up with her after her Sitz marker study and small-bowel follow-through are performed.        KOKO Naylor MD, FACS, FASCRS  Staff Surgeon  Colon & Rectal Surgery

## 2023-07-21 ENCOUNTER — PATIENT MESSAGE (OUTPATIENT)
Dept: OBSTETRICS AND GYNECOLOGY | Facility: CLINIC | Age: 38
End: 2023-07-21
Payer: COMMERCIAL

## 2023-07-21 ENCOUNTER — OFFICE VISIT (OUTPATIENT)
Dept: SURGERY | Facility: CLINIC | Age: 38
End: 2023-07-21
Payer: MEDICAID

## 2023-07-21 VITALS
WEIGHT: 205.88 LBS | HEIGHT: 65 IN | DIASTOLIC BLOOD PRESSURE: 77 MMHG | SYSTOLIC BLOOD PRESSURE: 125 MMHG | BODY MASS INDEX: 34.3 KG/M2 | HEART RATE: 93 BPM

## 2023-07-21 DIAGNOSIS — K59.04 CHRONIC IDIOPATHIC CONSTIPATION: Primary | ICD-10-CM

## 2023-07-21 PROCEDURE — 1159F PR MEDICATION LIST DOCUMENTED IN MEDICAL RECORD: ICD-10-PCS | Mod: CPTII,S$GLB,, | Performed by: COLON & RECTAL SURGERY

## 2023-07-21 PROCEDURE — 3078F PR MOST RECENT DIASTOLIC BLOOD PRESSURE < 80 MM HG: ICD-10-PCS | Mod: CPTII,S$GLB,, | Performed by: COLON & RECTAL SURGERY

## 2023-07-21 PROCEDURE — 99999 PR PBB SHADOW E&M-EST. PATIENT-LVL III: ICD-10-PCS | Mod: PBBFAC,,, | Performed by: COLON & RECTAL SURGERY

## 2023-07-21 PROCEDURE — 3078F DIAST BP <80 MM HG: CPT | Mod: CPTII,S$GLB,, | Performed by: COLON & RECTAL SURGERY

## 2023-07-21 PROCEDURE — 3008F BODY MASS INDEX DOCD: CPT | Mod: CPTII,S$GLB,, | Performed by: COLON & RECTAL SURGERY

## 2023-07-21 PROCEDURE — 99204 PR OFFICE/OUTPT VISIT, NEW, LEVL IV, 45-59 MIN: ICD-10-PCS | Mod: S$GLB,,, | Performed by: COLON & RECTAL SURGERY

## 2023-07-21 PROCEDURE — 3074F SYST BP LT 130 MM HG: CPT | Mod: CPTII,S$GLB,, | Performed by: COLON & RECTAL SURGERY

## 2023-07-21 PROCEDURE — 3074F PR MOST RECENT SYSTOLIC BLOOD PRESSURE < 130 MM HG: ICD-10-PCS | Mod: CPTII,S$GLB,, | Performed by: COLON & RECTAL SURGERY

## 2023-07-21 PROCEDURE — 1160F PR REVIEW ALL MEDS BY PRESCRIBER/CLIN PHARMACIST DOCUMENTED: ICD-10-PCS | Mod: CPTII,S$GLB,, | Performed by: COLON & RECTAL SURGERY

## 2023-07-21 PROCEDURE — 99213 OFFICE O/P EST LOW 20 MIN: CPT | Mod: PBBFAC | Performed by: COLON & RECTAL SURGERY

## 2023-07-21 PROCEDURE — 99999 PR PBB SHADOW E&M-EST. PATIENT-LVL III: CPT | Mod: PBBFAC,,, | Performed by: COLON & RECTAL SURGERY

## 2023-07-21 PROCEDURE — 99204 OFFICE O/P NEW MOD 45 MIN: CPT | Mod: S$GLB,,, | Performed by: COLON & RECTAL SURGERY

## 2023-07-21 PROCEDURE — 1160F RVW MEDS BY RX/DR IN RCRD: CPT | Mod: CPTII,S$GLB,, | Performed by: COLON & RECTAL SURGERY

## 2023-07-21 PROCEDURE — 1159F MED LIST DOCD IN RCRD: CPT | Mod: CPTII,S$GLB,, | Performed by: COLON & RECTAL SURGERY

## 2023-07-21 PROCEDURE — 3008F PR BODY MASS INDEX (BMI) DOCUMENTED: ICD-10-PCS | Mod: CPTII,S$GLB,, | Performed by: COLON & RECTAL SURGERY

## 2023-07-24 ENCOUNTER — HOSPITAL ENCOUNTER (OUTPATIENT)
Dept: RADIOLOGY | Facility: HOSPITAL | Age: 38
Discharge: HOME | End: 2023-07-24
Attending: COLON & RECTAL SURGERY
Payer: COMMERCIAL

## 2023-07-24 ENCOUNTER — OFFICE VISIT (OUTPATIENT)
Dept: SURGERY | Facility: CLINIC | Age: 38
End: 2023-07-24
Payer: COMMERCIAL

## 2023-07-24 VITALS
DIASTOLIC BLOOD PRESSURE: 88 MMHG | HEART RATE: 99 BPM | BODY MASS INDEX: 33.76 KG/M2 | WEIGHT: 202.63 LBS | SYSTOLIC BLOOD PRESSURE: 123 MMHG | HEIGHT: 65 IN

## 2023-07-24 DIAGNOSIS — K59.04 CHRONIC IDIOPATHIC CONSTIPATION: ICD-10-CM

## 2023-07-24 DIAGNOSIS — K35.30 ACUTE APPENDICITIS WITH LOCALIZED PERITONITIS, WITHOUT PERFORATION, ABSCESS, OR GANGRENE: Primary | ICD-10-CM

## 2023-07-24 PROCEDURE — 74018 RADEX ABDOMEN 1 VIEW: CPT | Mod: TC,FY,PO

## 2023-07-24 PROCEDURE — 3074F SYST BP LT 130 MM HG: CPT | Mod: CPTII,,, | Performed by: SURGERY

## 2023-07-24 PROCEDURE — 1159F PR MEDICATION LIST DOCUMENTED IN MEDICAL RECORD: ICD-10-PCS | Mod: CPTII,,, | Performed by: SURGERY

## 2023-07-24 PROCEDURE — 3008F BODY MASS INDEX DOCD: CPT | Mod: CPTII,,, | Performed by: SURGERY

## 2023-07-24 PROCEDURE — 99213 OFFICE O/P EST LOW 20 MIN: CPT | Mod: PBBFAC,PO | Performed by: SURGERY

## 2023-07-24 PROCEDURE — 99024 PR POST-OP FOLLOW-UP VISIT: ICD-10-PCS | Mod: ,,, | Performed by: SURGERY

## 2023-07-24 PROCEDURE — 1160F PR REVIEW ALL MEDS BY PRESCRIBER/CLIN PHARMACIST DOCUMENTED: ICD-10-PCS | Mod: CPTII,,, | Performed by: SURGERY

## 2023-07-24 PROCEDURE — 74018 RADEX ABDOMEN 1 VIEW: CPT | Mod: 26,,, | Performed by: RADIOLOGY

## 2023-07-24 PROCEDURE — 3074F PR MOST RECENT SYSTOLIC BLOOD PRESSURE < 130 MM HG: ICD-10-PCS | Mod: CPTII,,, | Performed by: SURGERY

## 2023-07-24 PROCEDURE — 99024 POSTOP FOLLOW-UP VISIT: CPT | Mod: ,,, | Performed by: SURGERY

## 2023-07-24 PROCEDURE — 1160F RVW MEDS BY RX/DR IN RCRD: CPT | Mod: CPTII,,, | Performed by: SURGERY

## 2023-07-24 PROCEDURE — 1159F MED LIST DOCD IN RCRD: CPT | Mod: CPTII,,, | Performed by: SURGERY

## 2023-07-24 PROCEDURE — 3079F DIAST BP 80-89 MM HG: CPT | Mod: CPTII,,, | Performed by: SURGERY

## 2023-07-24 PROCEDURE — 74018 XR ABDOMEN AP 1 VIEW: ICD-10-PCS | Mod: 26,,, | Performed by: RADIOLOGY

## 2023-07-24 PROCEDURE — 3079F PR MOST RECENT DIASTOLIC BLOOD PRESSURE 80-89 MM HG: ICD-10-PCS | Mod: CPTII,,, | Performed by: SURGERY

## 2023-07-24 PROCEDURE — 99999 PR PBB SHADOW E&M-EST. PATIENT-LVL III: CPT | Mod: PBBFAC,,, | Performed by: SURGERY

## 2023-07-24 PROCEDURE — 3008F PR BODY MASS INDEX (BMI) DOCUMENTED: ICD-10-PCS | Mod: CPTII,,, | Performed by: SURGERY

## 2023-07-24 PROCEDURE — 99999 PR PBB SHADOW E&M-EST. PATIENT-LVL III: ICD-10-PCS | Mod: PBBFAC,,, | Performed by: SURGERY

## 2023-07-24 NOTE — PROGRESS NOTES
[facility]  POST-OP NOTE    HPI: Priya Conrad is a 37 y.o. female s/p lap appy, doing well.      VITALS:  Vitals:    07/24/23 1408   BP: 123/88   Pulse: 99       PHYSICAL EXAM:  GEN: NAD  HEENT: NCAT  ABD: incisions C/D/I    PATHOLOGY:  Reviewed      ASSESSMENT & PLAN:  37 y.o. female, doing well, RTC as needed      Ki De La Fuente M.D., F.A.C.S.  [facility] and General Surgery  [facility] [place]

## 2023-07-25 ENCOUNTER — PATIENT MESSAGE (OUTPATIENT)
Dept: SURGERY | Facility: CLINIC | Age: 38
End: 2023-07-25
Payer: COMMERCIAL

## 2023-07-26 ENCOUNTER — HOSPITAL ENCOUNTER (OUTPATIENT)
Dept: RADIOLOGY | Facility: HOSPITAL | Age: 38
Discharge: HOME | End: 2023-07-26
Attending: COLON & RECTAL SURGERY
Payer: MEDICAID

## 2023-07-26 DIAGNOSIS — K59.04 CHRONIC IDIOPATHIC CONSTIPATION: ICD-10-CM

## 2023-07-26 PROCEDURE — 74018 XR ABDOMEN AP 1 VIEW: ICD-10-PCS | Mod: 26,,, | Performed by: RADIOLOGY

## 2023-07-26 PROCEDURE — 74018 RADEX ABDOMEN 1 VIEW: CPT | Mod: TC,FY,PO

## 2023-07-26 PROCEDURE — 74018 RADEX ABDOMEN 1 VIEW: CPT | Mod: 26,,, | Performed by: RADIOLOGY

## 2023-07-28 ENCOUNTER — HOSPITAL ENCOUNTER (OUTPATIENT)
Dept: RADIOLOGY | Facility: HOSPITAL | Age: 38
Discharge: HOME | End: 2023-07-28
Attending: COLON & RECTAL SURGERY
Payer: COMMERCIAL

## 2023-07-28 ENCOUNTER — CLINICAL SUPPORT (OUTPATIENT)
Dept: REHABILITATION | Facility: HOSPITAL | Age: 38
End: 2023-07-28
Attending: INTERNAL MEDICINE
Payer: MEDICAID

## 2023-07-28 DIAGNOSIS — K59.04 CHRONIC IDIOPATHIC CONSTIPATION: ICD-10-CM

## 2023-07-28 DIAGNOSIS — M62.89 PELVIC FLOOR DYSFUNCTION: Primary | ICD-10-CM

## 2023-07-28 PROCEDURE — 74018 RADEX ABDOMEN 1 VIEW: CPT | Mod: 26,,, | Performed by: RADIOLOGY

## 2023-07-28 PROCEDURE — 74018 RADEX ABDOMEN 1 VIEW: CPT | Mod: TC,FY,PO

## 2023-07-28 PROCEDURE — 97112 NEUROMUSCULAR REEDUCATION: CPT | Mod: PO

## 2023-07-28 PROCEDURE — 74018 XR ABDOMEN AP 1 VIEW: ICD-10-PCS | Mod: 26,,, | Performed by: RADIOLOGY

## 2023-07-28 NOTE — PATIENT INSTRUCTIONS
Home Program:  7/28/2023    Poop Sequence:   First make sure that you are sitting tall with feet on stool, knees slightly above hips.  As you work on this- look for the sweet spot of the right pelvic tilt to make your pushes effective.  Deep breath in, belly expands  Blow out and push down, keeping your belly OUT but firm as you blow through pursed lips.  Push for no more than 5-8 seconds.    Take a normal breath in between pushes.  2. 20 second blow outs:  deep breath in, the pursed lipped exhale for 20 seconds, noting tension building in your lower abdomen.  Do about 5 of these.      3. Avoid colon massage directly over the right colon for now.      4. Have a Happy Birthday!

## 2023-07-28 NOTE — PROGRESS NOTES
Pelvic Health Physical Therapy   Treatment Note     Name: Priya Conrad  [facility] Number: [id number]    Therapy Diagnosis:   Encounter Diagnosis   Name Primary?    Pelvic floor dysfunction Yes     Physician: Zheng Woodruff MD    Visit Date: 7/28/2023    Physician Orders: PT Eval and Treat    Medical Diagnosis from Referral: pelvic floor dysfunction in female  Evaluation Date: 6/23/2023  Authorization Period Expiration: 9/13/2023  Plan of Care Expiration: 9/23/2023  Visit # / Visits authorized: 2/ 1    Cancelled Visits: 0  No Show Visits: 0    Time In: 10:40  Time Out: 11:45  Total Billable Time: 60 minutes    Precautions: Standard    Subjective     Pt reports: that she has had an appy since our last visit.  Has had several BM's since this surgery.  Just completed a Sitz marker study.  Portal scars are a little sore.    She was compliant with home exercise program.  Response to previous treatment: no adverse effects  Functional change: having BM's a little more regularly    Pain: did not quantify     Objective     Priya participated in neuromuscular re-education activities to develop Coordination, Control, and Down training for 60 minutes including: Pelvic floor downtraining with assist of sEMG, pelvic floor downtraining with assist of RUSI , and diaphragmatic breathing  We first worked in supine with external lead wires.  She demonstrated normal baseline resting. Fair WR rise, and fair holding in 10 sec Kegels.    Her derecruitment was complete but delayed.  She fatigued toward the end of the 10 rep set.  She then performed TA sets with low pelvic floor overflow, but good holding ability and complete derecruitment.  This was followed by 10, 15, and 20 second blowouts to focus on improving abdominal wall tension for bearing down prep.  In SL bearing down, her PFM activity changed minimally during bear down- when given cues for breathing and abdominal distention, she was able to reduce her PFM activity during  bear down, with bulge of the perianal area.    We then looked at her AZALEA with transperineal RUSI.  The AZALEA was noted to decrease with Kegel, and increase as expected with bearing down cues.  This was reproduced in several trials.  She was issued a handout with instructions for 20 sec blow outs, and bearing down instructions.  I instructed her to avoid colon massage over the R colon for now.  We also discussed scar massage for when her incision is completely healed.      Home Exercises Provided and Patient Education Provided     Education provided:   - posture/body mechanices  Discussed progression of plan of care with patient; educated pt in activity modification; reviewed HEP with pt. Pt demonstrated and verbalized understanding of all instruction and was provided with a handout of HEP (see Patient Instructions).    Written Home Exercises Provided: yes.  Exercises were reviewed and Priya was able to demonstrate them prior to the end of the session.  Priya demonstrated good  understanding of the education provided.     See EMR under Patient Instructions for exercises provided 7/28/2023.    Assessment     Pt performed fairly well- will hopefully be able to execute this at home and evacuate more readily.    Priya Is progressing well towards her goals.   Pt prognosis is Good.     Pt will continue to benefit from skilled outpatient physical therapy to address the deficits listed in the problem list box on initial evaluation, provide pt/family education and to maximize pt's level of independence in the home and community environment.     Pt's spiritual, cultural and educational needs considered and pt agreeable to plan of care and goals.     Anticipated barriers to physical therapy: none    Goals: 12 weeks   Pt will report improved ability to perform ADLs (ie. dressing, bathing, functional transfers) with little (drops) to no urinary leakage 7/7 days per week.  Pt will verbalize improved awareness of PFM activity  as palpated by PT in order to improve activity involvement with HEP.  Pt will report improved ability to manage bladder spasms appropriately 100% of the time for an improvement in urinary frequency/urgency.   Pt will be independent with double voiding techniques 100% of the time to ensure full bladder emptying and decrease pt's risk of infection.   Pt will bear down appropriately 80% of the time for more effective stooling and prevent adverse effects to adjacent structures.   Pt will be independent in colon massage for more effective stooling.  Pt/family will be independent with HEP for continued self-management of symptoms.  Pt to report an overall decrease in abdominal pain during ADL's   ALL PROGRESSING    Plan     Plan of care Certification: 6/23/2023 to 9/23/2023.     Outpatient Physical Therapy 1 times per 2 week(s)  for 12 weeks to include the following interventions: therapeutic exercises, therapeutic activity, neuromuscular re-education, manual therapy, patient/family education, and self care/home management    Gracy Doe, PT, BCB-PMD

## 2023-07-31 ENCOUNTER — HOSPITAL ENCOUNTER (OUTPATIENT)
Dept: RADIOLOGY | Facility: HOSPITAL | Age: 38
Discharge: HOME | End: 2023-07-31
Attending: COLON & RECTAL SURGERY
Payer: MEDICAID

## 2023-07-31 DIAGNOSIS — K59.04 CHRONIC IDIOPATHIC CONSTIPATION: ICD-10-CM

## 2023-07-31 PROCEDURE — A9698 NON-RAD CONTRAST MATERIALNOC: HCPCS | Performed by: COLON & RECTAL SURGERY

## 2023-07-31 PROCEDURE — 74250 X-RAY XM SM INT 1CNTRST STD: CPT | Mod: 26,,, | Performed by: RADIOLOGY

## 2023-07-31 PROCEDURE — 74250 FL SMALL BOWEL FOLLOW THROUGH: ICD-10-PCS | Mod: 26,,, | Performed by: RADIOLOGY

## 2023-07-31 PROCEDURE — 74250 X-RAY XM SM INT 1CNTRST STD: CPT | Mod: TC,FY

## 2023-07-31 PROCEDURE — 25500020 PHARM REV CODE 255: Performed by: COLON & RECTAL SURGERY

## 2023-07-31 RX ADMIN — BARIUM SULFATE 590 ML: 0.6 SUSPENSION ORAL at 08:07

## 2023-08-01 ENCOUNTER — PATIENT MESSAGE (OUTPATIENT)
Dept: SURGERY | Facility: CLINIC | Age: 38
End: 2023-08-01
Payer: COMMERCIAL

## 2023-08-10 ENCOUNTER — PATIENT MESSAGE (OUTPATIENT)
Dept: GASTROENTEROLOGY | Facility: CLINIC | Age: 38
End: 2023-08-10
Payer: COMMERCIAL

## 2023-08-14 ENCOUNTER — TELEPHONE (OUTPATIENT)
Dept: GASTROENTEROLOGY | Facility: CLINIC | Age: 38
End: 2023-08-14
Payer: COMMERCIAL

## 2023-08-14 NOTE — TELEPHONE ENCOUNTER
----- Message from Ewa White sent at 8/14/2023  2:25 PM CDT -----  Type:  Patient Returning Call    Who Called:pt  Does the patient know what this is regarding?:returning call  Would the patient rather a call back or a response via [facility]? call  Best Call Back Number:[phone]  Additional Information: Pt stated if they do not answer please leave message on [facility].

## 2023-08-14 NOTE — TELEPHONE ENCOUNTER
----- Message from Janet Cesar sent at 8/14/2023  8:12 AM CDT -----  Type:  Same Day Appointment Request    Caller is requesting a same day appointment.  Caller declined first available appointment listed below.    Name of Caller:Pt   When is the first available appointment?N/A   Symptoms:abdominal pain   Best Call Back Number:[phone]  Additional Information: would like to be seen today  or sometimes this week  .. With available provider

## 2023-08-22 ENCOUNTER — OFFICE VISIT (OUTPATIENT)
Dept: SURGERY | Facility: CLINIC | Age: 38
End: 2023-08-22
Payer: COMMERCIAL

## 2023-08-22 VITALS
WEIGHT: 205.69 LBS | DIASTOLIC BLOOD PRESSURE: 73 MMHG | BODY MASS INDEX: 34.27 KG/M2 | HEART RATE: 89 BPM | SYSTOLIC BLOOD PRESSURE: 119 MMHG | HEIGHT: 65 IN

## 2023-08-22 DIAGNOSIS — K59.01 SLOW TRANSIT CONSTIPATION: Primary | ICD-10-CM

## 2023-08-22 PROCEDURE — 1159F PR MEDICATION LIST DOCUMENTED IN MEDICAL RECORD: ICD-10-PCS | Mod: CPTII,S$GLB,, | Performed by: COLON & RECTAL SURGERY

## 2023-08-22 PROCEDURE — 3008F PR BODY MASS INDEX (BMI) DOCUMENTED: ICD-10-PCS | Mod: CPTII,S$GLB,, | Performed by: COLON & RECTAL SURGERY

## 2023-08-22 PROCEDURE — 99999 PR PBB SHADOW E&M-EST. PATIENT-LVL III: CPT | Mod: PBBFAC,,, | Performed by: COLON & RECTAL SURGERY

## 2023-08-22 PROCEDURE — 99214 OFFICE O/P EST MOD 30 MIN: CPT | Mod: S$GLB,,, | Performed by: COLON & RECTAL SURGERY

## 2023-08-22 PROCEDURE — 3078F PR MOST RECENT DIASTOLIC BLOOD PRESSURE < 80 MM HG: ICD-10-PCS | Mod: CPTII,S$GLB,, | Performed by: COLON & RECTAL SURGERY

## 2023-08-22 PROCEDURE — 3008F BODY MASS INDEX DOCD: CPT | Mod: CPTII,S$GLB,, | Performed by: COLON & RECTAL SURGERY

## 2023-08-22 PROCEDURE — 1159F MED LIST DOCD IN RCRD: CPT | Mod: CPTII,S$GLB,, | Performed by: COLON & RECTAL SURGERY

## 2023-08-22 PROCEDURE — 99999 PR PBB SHADOW E&M-EST. PATIENT-LVL III: ICD-10-PCS | Mod: PBBFAC,,, | Performed by: COLON & RECTAL SURGERY

## 2023-08-22 PROCEDURE — 3074F PR MOST RECENT SYSTOLIC BLOOD PRESSURE < 130 MM HG: ICD-10-PCS | Mod: CPTII,S$GLB,, | Performed by: COLON & RECTAL SURGERY

## 2023-08-22 PROCEDURE — 99214 PR OFFICE/OUTPT VISIT, EST, LEVL IV, 30-39 MIN: ICD-10-PCS | Mod: S$GLB,,, | Performed by: COLON & RECTAL SURGERY

## 2023-08-22 PROCEDURE — 1160F PR REVIEW ALL MEDS BY PRESCRIBER/CLIN PHARMACIST DOCUMENTED: ICD-10-PCS | Mod: CPTII,S$GLB,, | Performed by: COLON & RECTAL SURGERY

## 2023-08-22 PROCEDURE — 3074F SYST BP LT 130 MM HG: CPT | Mod: CPTII,S$GLB,, | Performed by: COLON & RECTAL SURGERY

## 2023-08-22 PROCEDURE — 3078F DIAST BP <80 MM HG: CPT | Mod: CPTII,S$GLB,, | Performed by: COLON & RECTAL SURGERY

## 2023-08-22 PROCEDURE — 1160F RVW MEDS BY RX/DR IN RCRD: CPT | Mod: CPTII,S$GLB,, | Performed by: COLON & RECTAL SURGERY

## 2023-08-22 RX ORDER — PROPRANOLOL HYDROCHLORIDE 120 MG/1
CAPSULE, EXTENDED RELEASE ORAL
COMMUNITY
Start: 2023-07-28 | End: 2024-03-13

## 2023-08-22 NOTE — PROGRESS NOTES
CRS Office Visit Followup    Referring Md:   No referring provider defined for this encounter.    SUBJECTIVE:     Chief Complaint: defecatory dysfunction    History of Present Illness:  Course is as follows:  Patient is a 38 y.o. female presents with defecatory dysfunction.     Seen by GI for evaluation for constipation  Taking linzess 290 every morning. Not really helping.  trulance was effective, then stopped working   Tried motegrity - got bad headaches. Not taking anymore  Failed amitiza     Recurrent regimen of Linzess 290 mcg every day, she will not have any bowel movements unless she takes a laxative.  She complains of significant bloating, abdominal distention, pain, and inability to function normally secondary to her discomfort.  Thyroid function normal.    Past abdominal surgeries of prior  through a lower midline, abdominal plasty, and recent laparoscopic appendectomy.    No family hx of CRC in 1st degree.   Aunt - CRC    Workup:  Colonoscopy:  10/20/2021: Normal ileum and colon with redundant sigmoid colon  Defecogram 22:   - The anorectal angle measurements fall within range and there is expected increase in the angle on defecation.    - Small anterior rectocele.    - Small amount of residual after evacuation   CT abdomen pelvis 2023 reviewed demonstrates moderate amount of stool throughout the colon.  Small-bowel follow-through 2028: Normal small bowel follow through evaluation  Sitz Marker Study:    Day 1 (2023):  13 markers in the right colon.  Four markers of the hepatic flexure.  Two markers in the transverse colon.  Day 3 (2023):  5 markers at the splenic flexure.  One marker in the rectum.  Day 5 (2023):  2 markers in the descending colon.  Four markers in the rectum.    Impression:  Consistent with slow transit constipation.     2023: Presents for follow-up.  Continuing to have significant issues with constipation.    Last Colonoscopy:   "10/20/2021:  Impression:            - The examined portion of the ileum was normal.                          - The entire examined colon is normal on direct                          and retroflexion views. Sigmoid colon is redundant.                          - No specimens collected.       Review of Systems:  Review of Systems   Constitutional:  Negative for chills, diaphoresis, fever, malaise/fatigue and weight loss.   HENT:  Negative for congestion.    Respiratory:  Negative for shortness of breath.    Cardiovascular:  Negative for chest pain and leg swelling.   Gastrointestinal:  Positive for abdominal pain and constipation. Negative for blood in stool, nausea and vomiting.   Genitourinary:  Negative for dysuria.   Musculoskeletal:  Negative for back pain and myalgias.   Skin:  Negative for rash.   Neurological:  Negative for dizziness and weakness.   Endo/Heme/Allergies:  Does not bruise/bleed easily.   Psychiatric/Behavioral:  Negative for depression.        OBJECTIVE:     Vital Signs (Most Recent)  /73 (BP Location: Left arm, Patient Position: Sitting)   Pulse 89   Ht 5' 5" (1.651 m)   Wt 93.3 kg (205 lb 11 oz)   BMI 34.23 kg/m²     Physical Exam:  General: Black or  female in no distress   Neuro: alert and oriented x 4.  Moves all extremities.     HEENT: no icterus.  Trachea midline  Respiratory: respirations are even and unlabored  Cardiac: regular rate  Abdomen:  Prior abdominal plasty incision at her belt line healing well.  Laparoscopic incisions from prior appendectomy also healing well.  Extremities: Warm dry and intact  Skin: no rashes  Anorectal:  External exam was normal.  Digital exam performed with normal resting tone.  No masses palpated.  Manometry balloon was then inserted and filled to 60 mL of water.  She was then placed on the toilet and asked to expel the balloon.  She expelled the balloon in 2 minutes.    Labs:  H&H 13 and 38.  Albumin 4.2.  Normal renal function.  " Normal thyroid function.    Imaging:  CT abdomen pelvis 07/09/2023 reviewed demonstrates moderate amount of stool throughout the colon.  Acute appendicitis.      ASSESSMENT/PLAN:     Diagnoses and all orders for this visit:    Slow transit constipation          38 y.o. female with significant constipation.  Defecography shows that the rectum was functional to liquids.  Balloon expulsion was performed today in clinic she was able to expel the balloon with 60 mL of water.  Small-bowel follow-through demonstrates normal transit time of her small bowel.  Sitz marker study consistent with slow transit constipation.  Long discussion regarding treatment options for slow transit constipation.  Discussed that there were multiple medical options for which she has trialed all of them.  If she is unable to have successful bowel movements with maximum medical therapy, further consideration can then be given to total abdominal colectomy.  Discussed this could be performed laparoscopically with extraction through her prior Pfannenstiel incision from her abdominal plasty.  Discussed the risks of surgery to include anastomotic leak, diarrhea, bleeding, wound infection, hernia.  Discussed the alternatives of continuing to try different medical therapies.  She wishes to consider this further and will let me know when she wishes to proceed.              KOKO Naylor MD, FACS, FASCRS  Staff Surgeon  Colon & Rectal Surgery

## 2023-08-22 NOTE — LETTER
August 22, 2023      [facility] [address]  [address]  [facility] [address]  Phone: [phone]       Patient: Priya Conrad   YOB: 1985  Date of Visit: 08/22/2023    To Whom It May Concern:    Kathy Conrad  was at [facility] on 08/22/2023. The patient may return to work/school on 08/23/2023 with no restrictions. If you have any questions or concerns, or if I can be of further assistance, please do not hesitate to contact me.    Sincerely,    Brody Naylor MD.

## 2023-09-20 ENCOUNTER — TELEPHONE (OUTPATIENT)
Dept: NEUROSURGERY | Facility: CLINIC | Age: 38
End: 2023-09-20
Payer: COMMERCIAL

## 2023-09-20 DIAGNOSIS — M54.9 BACK PAIN, UNSPECIFIED BACK LOCATION, UNSPECIFIED BACK PAIN LATERALITY, UNSPECIFIED CHRONICITY: Primary | ICD-10-CM

## 2023-09-21 ENCOUNTER — HOSPITAL ENCOUNTER (OUTPATIENT)
Dept: RADIOLOGY | Facility: HOSPITAL | Age: 38
Discharge: HOME | End: 2023-09-21
Attending: NURSE PRACTITIONER
Payer: COMMERCIAL

## 2023-09-21 DIAGNOSIS — M54.9 BACK PAIN, UNSPECIFIED BACK LOCATION, UNSPECIFIED BACK PAIN LATERALITY, UNSPECIFIED CHRONICITY: ICD-10-CM

## 2023-09-21 PROCEDURE — 72040 X-RAY EXAM NECK SPINE 2-3 VW: CPT | Mod: 26,,, | Performed by: RADIOLOGY

## 2023-09-21 PROCEDURE — 72040 XR CERVICAL SPINE AP LATERAL: ICD-10-PCS | Mod: 26,,, | Performed by: RADIOLOGY

## 2023-09-21 PROCEDURE — 72070 X-RAY EXAM THORAC SPINE 2VWS: CPT | Mod: 26,,, | Performed by: RADIOLOGY

## 2023-09-21 PROCEDURE — 72070 X-RAY EXAM THORAC SPINE 2VWS: CPT | Mod: TC,FY,PO

## 2023-09-21 PROCEDURE — 72070 XR THORACIC SPINE AP LATERAL: ICD-10-PCS | Mod: 26,,, | Performed by: RADIOLOGY

## 2023-09-21 PROCEDURE — 72040 X-RAY EXAM NECK SPINE 2-3 VW: CPT | Mod: TC,FY,PO

## 2023-09-22 ENCOUNTER — OFFICE VISIT (OUTPATIENT)
Dept: NEUROSURGERY | Facility: CLINIC | Age: 38
End: 2023-09-22
Payer: COMMERCIAL

## 2023-09-22 DIAGNOSIS — M54.2 CERVICALGIA: Primary | ICD-10-CM

## 2023-09-22 DIAGNOSIS — M54.12 CERVICAL RADICULOPATHY: ICD-10-CM

## 2023-09-22 DIAGNOSIS — M54.2 CERVICAL MUSCLE PAIN: ICD-10-CM

## 2023-09-22 PROCEDURE — 99204 OFFICE O/P NEW MOD 45 MIN: CPT | Mod: 95,,, | Performed by: NURSE PRACTITIONER

## 2023-09-22 PROCEDURE — 1160F RVW MEDS BY RX/DR IN RCRD: CPT | Mod: CPTII,95,, | Performed by: NURSE PRACTITIONER

## 2023-09-22 PROCEDURE — 1160F PR REVIEW ALL MEDS BY PRESCRIBER/CLIN PHARMACIST DOCUMENTED: ICD-10-PCS | Mod: CPTII,95,, | Performed by: NURSE PRACTITIONER

## 2023-09-22 PROCEDURE — 99204 PR OFFICE/OUTPT VISIT, NEW, LEVL IV, 45-59 MIN: ICD-10-PCS | Mod: 95,,, | Performed by: NURSE PRACTITIONER

## 2023-09-22 PROCEDURE — 1159F PR MEDICATION LIST DOCUMENTED IN MEDICAL RECORD: ICD-10-PCS | Mod: CPTII,95,, | Performed by: NURSE PRACTITIONER

## 2023-09-22 PROCEDURE — 1159F MED LIST DOCD IN RCRD: CPT | Mod: CPTII,95,, | Performed by: NURSE PRACTITIONER

## 2023-09-22 NOTE — PROGRESS NOTES
Neurosurgery  History & Physical    SUBJECTIVE:   Telehealth Visit     The patient location is: Home  The chief complaint leading to consultation is: MRI Results  Visit type: Virtual visit with audio and video  Total time spent with patient: 10 minutes     Each patient to whom I provide medical services by telemedicine is:  (1) informed of the relationship between the physician and patient and the respective role of any other health care provider with respect to management of the patient; and (2) notified that they may decline to receive medical services by telemedicine and may withdraw from such care at any time. Patient verbally consented to receive this service via voice-only telephone call.     This service was not originating from a related E/M service provided within the previous 7 days nor will  to an E/M service or procedure within the next 24 hours or my soonest available appointment.  Prevailing standard of care was able to be met in this audio-only visit.       History of Present Illness: Priya Conrad is a 38 y.o. female being seen today to discuss concerns with worsening neck and left arm pain. The patient has struggled with neck and arm pain for a couple of years that has worsened lately. Denies trauma or inciting event. Describes the pain as constant and aching down the neck into the arm associated with tingling. Aggravating factors include movement towards the right. Denies b/b dysfunction, saddle anesthesia, or gait instability. Endorses subjective weakness in the left arm with frequent dropping of objects. She has tried chiropractic adjustments, muscle relaxer, and OTC medications without significant relief.     Review of patient's allergies indicates:  No Known Allergies    Current Outpatient Medications   Medication Sig Dispense Refill    amLODIPine (NORVASC) 5 MG tablet Take 5 mg by mouth once daily.      lactulose (CHRONULAC) 20 gram/30 mL Soln Take 30 mLs (20 g total) by mouth 2  (two) times daily. 450 mL 0    linaCLOtide (LINZESS) 290 mcg Cap capsule Take 1 capsule (290 mcg total) by mouth before breakfast. 30 capsule 3    propranoloL (INDERAL LA) 120 MG 24 hr capsule       prucalopride (MOTEGRITY) 2 mg Tab Take  1 tablet (2 mg) by mouth once daily at 6am. (Patient not taking: Reported on 2023) 30 tablet 6    rimegepant (NURTEC) 75 mg odt Take 75 mg by mouth once as needed for Migraine.       No current facility-administered medications for this visit.       Past Medical History:   Diagnosis Date    Anxiety     Heart murmur     Hypertension     Migraine      Past Surgical History:   Procedure Laterality Date     SECTION      COLONOSCOPY  10/20/2021    COLONOSCOPY N/A 10/20/2021    Procedure: COLONOSCOPY 2 day golytely;  Surgeon: Zheng Woodruff MD;  Location: [facility] ENDO;  Service: Endoscopy;  Laterality: N/A;    ESOPHAGOGASTRODUODENOSCOPY N/A 10/20/2021    Procedure: EGD (ESOPHAGOGASTRODUODENOSCOPY);  Surgeon: Zheng Woodruff MD;  Location: [facility] ENDO;  Service: Endoscopy;  Laterality: N/A;    LAPAROSCOPIC APPENDECTOMY N/A 7/10/2023    Procedure: APPENDECTOMY, LAPAROSCOPIC;  Surgeon: Ki De La Fuente MD;  Location: [facility] OR;  Service: General;  Laterality: N/A;    TUBAL LIGATION      Tummy Tuck      UPPER GASTROINTESTINAL ENDOSCOPY  10/20/2021     Family History       Problem Relation (Age of Onset)    Breast cancer Paternal Aunt    Heart failure Maternal Grandmother, Maternal Grandfather    Hypertension Mother          Social History     Socioeconomic History    Marital status:     Number of children: 3   Occupational History    Occupation: medical assistant     Comment: [employer]   Tobacco Use    Smoking status: Never    Smokeless tobacco: Never   Substance and Sexual Activity    Alcohol use: Not Currently     Comment: socially    Drug use: No   Social History Narrative    , 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    Diagnostic Results:  I have personally reviewed the x-ray cervical and thoracic spine dated 9/20/23:    X-ray cervical shows degenerative changes most pronounced at C4/5    2.   X-ray thoracic spine shows no acute abnormalities    ASSESSMENT/PLAN:   Priya Conrad is a 38 y.o. female seen today to discuss concerns with worsening neck and left arm pain. The patient has struggled with neck and arm pain for a couple of years that has worsened lately despite conservative treatment. I have ordered a MRI cervical spine to evaluate for stenosis given her radicular complaints and degenerative changes seen on imaging.     I would like the patient to follow-up in clinic in 2-4 weeks to discuss the image findings. I have encouraged her to contact the clinic with any questions, concerns, or adverse clinical changes. She verbalized understanding.      VESNA Navarro-NONA  Neurosurgery  [facility].      Note dictated with voice recognition software, please excuse any grammatical errors.

## 2023-09-25 ENCOUNTER — PATIENT MESSAGE (OUTPATIENT)
Dept: NEUROSURGERY | Facility: CLINIC | Age: 38
End: 2023-09-25
Payer: COMMERCIAL

## 2023-09-28 ENCOUNTER — TELEPHONE (OUTPATIENT)
Dept: NEUROSURGERY | Facility: CLINIC | Age: 38
End: 2023-09-28
Payer: COMMERCIAL

## 2023-09-28 NOTE — TELEPHONE ENCOUNTER
----- Message from Blanche Coy NP sent at 9/27/2023  1:16 PM CDT -----  Regarding: FW: prior  Contact: Juan Francisco [phone]    ----- Message -----  From: Carrie Naranjo  Sent: 9/27/2023  12:11 PM CDT  To: Zbigniew [facility] Staff  Subject: prior Chicas is calling to reach MA Cheryl in the office in reference to pt MRI of spine. Caller is advising MRI will require prior auth. Please call to further advise. Thank you for all you are doing.

## 2023-10-02 ENCOUNTER — PATIENT MESSAGE (OUTPATIENT)
Dept: NEUROSURGERY | Facility: CLINIC | Age: 38
End: 2023-10-02
Payer: COMMERCIAL

## 2023-10-02 ENCOUNTER — TELEPHONE (OUTPATIENT)
Dept: NEUROSURGERY | Facility: CLINIC | Age: 38
End: 2023-10-02
Payer: COMMERCIAL

## 2023-10-02 DIAGNOSIS — M54.12 CERVICAL RADICULOPATHY: ICD-10-CM

## 2023-10-02 DIAGNOSIS — M54.2 CERVICALGIA: Primary | ICD-10-CM

## 2023-10-02 NOTE — TELEPHONE ENCOUNTER
Pt was advised that her insurance company denied her Mri  pt was told that she needed to  complete at  least 6weeks of physical therapy pt said she received something from her insurance company stated it was approved pt was advised that we received a denial letter and if she choose to go on her own she will receive a bill and be fully responsible pt verbally understood and agreed and stated she will call her insurance company for confirmation.

## 2023-10-16 ENCOUNTER — PATIENT MESSAGE (OUTPATIENT)
Dept: SURGERY | Facility: CLINIC | Age: 38
End: 2023-10-16
Payer: COMMERCIAL

## 2023-10-20 ENCOUNTER — TELEPHONE (OUTPATIENT)
Dept: NEUROSURGERY | Facility: CLINIC | Age: 38
End: 2023-10-20
Payer: COMMERCIAL

## 2023-10-20 ENCOUNTER — PATIENT MESSAGE (OUTPATIENT)
Dept: NEUROSURGERY | Facility: CLINIC | Age: 38
End: 2023-10-20
Payer: COMMERCIAL

## 2023-10-20 NOTE — TELEPHONE ENCOUNTER
----- Message from Bonnie Morse sent at 10/20/2023 10:15 AM CDT -----  Regarding: pt adivce  Contact: [phone]  Pt returning miss call from nurse Castaneda  in regards to scheduling a MRI.  Pt asking to leave all appt, dates and any info in her pt portal. Saulo hathaway

## 2023-10-23 ENCOUNTER — TELEPHONE (OUTPATIENT)
Dept: NEUROSURGERY | Facility: CLINIC | Age: 38
End: 2023-10-23
Payer: COMMERCIAL

## 2023-10-23 NOTE — TELEPHONE ENCOUNTER
----- Message from Itz Allison sent at 10/23/2023  3:21 PM CDT -----  Regarding: appt  Contact: @[phone]  Pt calling in regards to appt on 10/30 states she would prefer virtual if possible ....... Pls call and adv@[phone]

## 2023-10-23 NOTE — TELEPHONE ENCOUNTER
----- Message from Blanche Coy NP sent at 10/23/2023 12:36 PM CDT -----  Regarding: FW: returning call  Contact: pt [phone]    ----- Message -----  From: Sakshi Taylor  Sent: 10/23/2023  12:25 PM CDT  To: Zbigniew [facility]WE[facility] Staff  Subject: returning call                                   Who Called:Priya    Who Left Message for Patient:Tonya    Does the patient know what this is regarding?:f/u appt for MRI results    Would the patient rather a call back or a response via [facility]? Call back    Best Call Back Number:[phone]    Additional Information: pt stated pls leave appt date on her VM or send [employer] message if she doesn't answer pt stated she is at work and unable to answer when the nurse calls

## 2023-10-25 ENCOUNTER — DOCUMENTATION ONLY (OUTPATIENT)
Dept: REHABILITATION | Facility: HOSPITAL | Age: 38
End: 2023-10-25
Payer: COMMERCIAL

## 2023-10-25 NOTE — PROGRESS NOTES
10/25/2023    Pt has not attended pelvic PT sessions since 7/28/2023 and will be discharged from PT with goal status unknown.

## 2023-11-02 ENCOUNTER — OFFICE VISIT (OUTPATIENT)
Dept: NEUROSURGERY | Facility: CLINIC | Age: 38
End: 2023-11-02
Payer: COMMERCIAL

## 2023-11-02 DIAGNOSIS — M54.12 CERVICAL RADICULOPATHY: Primary | ICD-10-CM

## 2023-11-02 PROCEDURE — 1160F RVW MEDS BY RX/DR IN RCRD: CPT | Mod: CPTII,95,, | Performed by: NURSE PRACTITIONER

## 2023-11-02 PROCEDURE — 1159F MED LIST DOCD IN RCRD: CPT | Mod: CPTII,95,, | Performed by: NURSE PRACTITIONER

## 2023-11-02 PROCEDURE — 1159F PR MEDICATION LIST DOCUMENTED IN MEDICAL RECORD: ICD-10-PCS | Mod: CPTII,95,, | Performed by: NURSE PRACTITIONER

## 2023-11-02 PROCEDURE — 99213 OFFICE O/P EST LOW 20 MIN: CPT | Mod: 95,,, | Performed by: NURSE PRACTITIONER

## 2023-11-02 PROCEDURE — 1160F PR REVIEW ALL MEDS BY PRESCRIBER/CLIN PHARMACIST DOCUMENTED: ICD-10-PCS | Mod: CPTII,95,, | Performed by: NURSE PRACTITIONER

## 2023-11-02 PROCEDURE — 99213 PR OFFICE/OUTPT VISIT, EST, LEVL III, 20-29 MIN: ICD-10-PCS | Mod: 95,,, | Performed by: NURSE PRACTITIONER

## 2023-11-02 NOTE — PROGRESS NOTES
Neurosurgery  Established Patient    SUBJECTIVE:   The patient location is: Work  The chief complaint leading to consultation is: MRI Results  Visit type: Virtual visit with audio and video  Total time spent with patient: 10 minutes     Each patient to whom I provide medical services by telemedicine is:  (1) informed of the relationship between the physician and patient and the respective role of any other health care provider with respect to management of the patient; and (2) notified that they may decline to receive medical services by telemedicine and may withdraw from such care at any time. Patient verbally consented to receive this service via voice-only telephone call.     This service was not originating from a related E/M service provided within the previous 7 days nor will  to an E/M service or procedure within the next 24 hours or my soonest available appointment.  Prevailing standard of care was able to be met in this audio-only visit.        History of Present Illness: Priya Conrad is a 38 y.o. female being seen today to discuss concerns with worsening neck and left arm pain. The patient has struggled with neck and arm pain for a couple of years that has worsened lately. Denies trauma or inciting event. Describes the pain as constant and aching down the neck into the arm associated with tingling. Aggravating factors include movement towards the right. Denies b/b dysfunction, saddle anesthesia, or gait instability. Endorses subjective weakness in the left arm with frequent dropping of objects. She has tried chiropractic adjustments, muscle relaxer, and OTC medications without significant relief.     Interval Hx 11/2/23: After the last appointment, it was recommended that the patient obtain a MRI to further evaluate her concerns. She is being seen virtually today to discuss the findings. States that she is struggling with left and right sided pain. Denies new neurological complaints.     Review  of patient's allergies indicates:  No Known Allergies    Current Outpatient Medications   Medication Sig Dispense Refill    amLODIPine (NORVASC) 5 MG tablet Take 5 mg by mouth once daily.      lactulose (CHRONULAC) 20 gram/30 mL Soln Take 30 mLs (20 g total) by mouth 2 (two) times daily. 450 mL 0    linaCLOtide (LINZESS) 290 mcg Cap capsule Take 1 capsule (290 mcg total) by mouth before breakfast. 30 capsule 3    propranoloL (INDERAL LA) 120 MG 24 hr capsule       prucalopride (MOTEGRITY) 2 mg Tab Take  1 tablet (2 mg) by mouth once daily at 6am. (Patient not taking: Reported on 2023) 30 tablet 6    rimegepant (NURTEC) 75 mg odt Take 75 mg by mouth once as needed for Migraine.       No current facility-administered medications for this visit.       Past Medical History:   Diagnosis Date    Anxiety     Heart murmur     Hypertension     Migraine      Past Surgical History:   Procedure Laterality Date     SECTION      COLONOSCOPY  10/20/2021    COLONOSCOPY N/A 10/20/2021    Procedure: COLONOSCOPY 2 day golytely;  Surgeon: Zheng Woodruff MD;  Location: [facility] ENDO;  Service: Endoscopy;  Laterality: N/A;    ESOPHAGOGASTRODUODENOSCOPY N/A 10/20/2021    Procedure: EGD (ESOPHAGOGASTRODUODENOSCOPY);  Surgeon: Zheng Woodruff MD;  Location: [facility] ENDO;  Service: Endoscopy;  Laterality: N/A;    LAPAROSCOPIC APPENDECTOMY N/A 7/10/2023    Procedure: APPENDECTOMY, LAPAROSCOPIC;  Surgeon: Ki De La Fuente MD;  Location: [facility] OR;  Service: General;  Laterality: N/A;    TUBAL LIGATION      [facility]      UPPER GASTROINTESTINAL ENDOSCOPY  10/20/2021     Family History       Problem Relation (Age of Onset)    Breast cancer Paternal Aunt    Heart failure Maternal Grandmother, Maternal Grandfather    Hypertension Mother          Social History     Socioeconomic History    Marital status:     Number of children: 3   Occupational History    Occupation: medical assistant     Comment: [employer]   Tobacco Use     Smoking status: Never    Smokeless tobacco: Never   Substance and Sexual Activity    Alcohol use: Not Currently     Comment: socially    Drug use: No   Social History Narrative    , 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    Neurosurgery Physical Exam  General: well developed, well nourished, no distress.   Head: normocephalic  Neurologic: Alert and oriented. Thought content appropriate.  GCS: Motor: 6/Verbal: 5/Eyes: 4 GCS Total: 15  Mental Status: Awake, Alert, Oriented x 4  Language: No aphasia  Speech: No dysarthria  Cranial nerves: CN II-XII grossly intact.   Eyes: EOMI appear grossly intact  Pulmonary: no signs of respiratory distress  Motor Strength:Moves all extremities spontaneously with good tone. BUE and BLE are at least 3/5. No abnormal movements seen.   Finger-to-nose: intact bilaterally   Pronator drift: absent bilaterally     Diagnostic Results:  I have personally reviewed the MRI cervical spine dated 10/16/23, which shows multilevel degenerative changes most pronounced C4-5 with moderate central and right neural foraminal narrowing.      ASSESSMENT/PLAN:   Priya Conrad is a 38 y.o. female seen virtually today to discuss the findings. States that she is struggling with left and right sided pain. Denies new neurological complaints. We discussed that injections could provide her with additional pain relief as she is uninterested in PT at this time. I have placed a referral.     I would like the patient to follow-up in clinic as needed. I have encouraged her to contact the clinic with any questions, concerns, or adverse clinical changes. She verbalized understanding.      MERE Navarro  Neurosurgery  [facility].      Note dictated with voice recognition software, please excuse any grammatical errors.

## 2023-11-17 ENCOUNTER — TELEPHONE (OUTPATIENT)
Dept: PAIN MEDICINE | Facility: CLINIC | Age: 38
End: 2023-11-17
Payer: COMMERCIAL

## 2023-11-17 ENCOUNTER — OFFICE VISIT (OUTPATIENT)
Dept: PAIN MEDICINE | Facility: CLINIC | Age: 38
End: 2023-11-17
Payer: COMMERCIAL

## 2023-11-17 VITALS
WEIGHT: 205.69 LBS | BODY MASS INDEX: 34.27 KG/M2 | HEART RATE: 86 BPM | SYSTOLIC BLOOD PRESSURE: 131 MMHG | DIASTOLIC BLOOD PRESSURE: 82 MMHG | HEIGHT: 65 IN

## 2023-11-17 DIAGNOSIS — M50.30 DDD (DEGENERATIVE DISC DISEASE), CERVICAL: ICD-10-CM

## 2023-11-17 DIAGNOSIS — M54.12 CERVICAL RADICULOPATHY: Primary | ICD-10-CM

## 2023-11-17 DIAGNOSIS — M54.12 CERVICAL RADICULOPATHY: ICD-10-CM

## 2023-11-17 PROCEDURE — 1159F PR MEDICATION LIST DOCUMENTED IN MEDICAL RECORD: ICD-10-PCS | Mod: CPTII,S$GLB,, | Performed by: EMERGENCY MEDICINE

## 2023-11-17 PROCEDURE — 99999 PR PBB SHADOW E&M-EST. PATIENT-LVL III: CPT | Mod: PBBFAC,,, | Performed by: EMERGENCY MEDICINE

## 2023-11-17 PROCEDURE — 99203 OFFICE O/P NEW LOW 30 MIN: CPT | Mod: S$GLB,,, | Performed by: EMERGENCY MEDICINE

## 2023-11-17 PROCEDURE — 1159F MED LIST DOCD IN RCRD: CPT | Mod: CPTII,S$GLB,, | Performed by: EMERGENCY MEDICINE

## 2023-11-17 PROCEDURE — 99999 PR PBB SHADOW E&M-EST. PATIENT-LVL III: ICD-10-PCS | Mod: PBBFAC,,, | Performed by: EMERGENCY MEDICINE

## 2023-11-17 PROCEDURE — 3008F BODY MASS INDEX DOCD: CPT | Mod: CPTII,S$GLB,, | Performed by: EMERGENCY MEDICINE

## 2023-11-17 PROCEDURE — 99203 PR OFFICE/OUTPT VISIT, NEW, LEVL III, 30-44 MIN: ICD-10-PCS | Mod: S$GLB,,, | Performed by: EMERGENCY MEDICINE

## 2023-11-17 PROCEDURE — 3079F DIAST BP 80-89 MM HG: CPT | Mod: CPTII,S$GLB,, | Performed by: EMERGENCY MEDICINE

## 2023-11-17 PROCEDURE — 3079F PR MOST RECENT DIASTOLIC BLOOD PRESSURE 80-89 MM HG: ICD-10-PCS | Mod: CPTII,S$GLB,, | Performed by: EMERGENCY MEDICINE

## 2023-11-17 PROCEDURE — 3075F PR MOST RECENT SYSTOLIC BLOOD PRESS GE 130-139MM HG: ICD-10-PCS | Mod: CPTII,S$GLB,, | Performed by: EMERGENCY MEDICINE

## 2023-11-17 PROCEDURE — 3075F SYST BP GE 130 - 139MM HG: CPT | Mod: CPTII,S$GLB,, | Performed by: EMERGENCY MEDICINE

## 2023-11-17 PROCEDURE — 3008F PR BODY MASS INDEX (BMI) DOCUMENTED: ICD-10-PCS | Mod: CPTII,S$GLB,, | Performed by: EMERGENCY MEDICINE

## 2023-11-17 RX ORDER — FLUOXETINE HYDROCHLORIDE 20 MG/1
20 CAPSULE ORAL DAILY
COMMUNITY
Start: 2023-07-28

## 2023-11-17 RX ORDER — GABAPENTIN 300 MG/1
600 CAPSULE ORAL NIGHTLY
COMMUNITY

## 2023-11-17 RX ORDER — ALPRAZOLAM 0.5 MG/1
0.5 TABLET ORAL DAILY PRN
COMMUNITY
Start: 2023-08-25

## 2023-11-17 RX ORDER — TIZANIDINE 4 MG/1
4 TABLET ORAL NIGHTLY PRN
Qty: 30 TABLET | Refills: 0 | Status: SHIPPED | OUTPATIENT
Start: 2023-11-17 | End: 2023-12-17

## 2023-11-17 RX ORDER — MELOXICAM 7.5 MG/1
7.5 TABLET ORAL DAILY PRN
Qty: 30 TABLET | Refills: 0 | Status: SHIPPED | OUTPATIENT
Start: 2023-11-17 | End: 2023-12-17

## 2023-11-17 NOTE — PROGRESS NOTES
This note was completed with dictation software and grammatical errors may exist.    PCP: Vinny Rose MD    Chief Complaint   Patient presents with    Neck Pain    Arm Pain     Left arm         Original HISTORY OF PRESENT ILLNESS: Priya Conrad is a 38 y.o. year old female patient who has a past medical history of Anxiety, Heart murmur, Hypertension, and Migraine. She presents in referral from Blanche Coy for cervical radiculopathy    Original Pain Description:  The pain is located in the right neck and is radiating to the left arm and all 5 fingers of her hand . The pain is described as aching, sharp, and stabbing. Exacerbating factors: turning her head to the right. Mitigating factors rest. Symptoms interfere with daily activity and work. The patient feels like symptoms have been worsening. Patient denies night fever/night sweats, urinary incontinence, bowel incontinence, significant weight loss, significant motor weakness, and loss of sensations. Pt notes long standing hx of left hand weakness which is worse with the neck pain.      Original PAIN SCORES:  Best: Pain is 4  Worst: Pain is 10  Current: Pain is 4         No data to display                INTERVAL HISTORY: (Newest visit at the bottom)   Interval History (Date):       6 weeks of Conservative therapy:  PT: Completed   Chiro: No  HEP: Currently participating      Treatments / Medications: (Ice/Heat/NSAIDS/APAP/etc):  Gabapentin 600mg qhs  Naproxyn 500mg PRN    Antiplatelets/Anticoagulants:  No    Interventional Pain Procedures: (Previous injections)  No    Past Surgical History:   Procedure Laterality Date     SECTION      COLONOSCOPY  10/20/2021    COLONOSCOPY N/A 10/20/2021    Procedure: COLONOSCOPY 2 day hanny;  Surgeon: Zheng Woodruff MD;  Location: [facility];  Service: Endoscopy;  Laterality: N/A;    ESOPHAGOGASTRODUODENOSCOPY N/A 10/20/2021    Procedure: EGD (ESOPHAGOGASTRODUODENOSCOPY);  Surgeon: Zheng Woodruff MD;   "Location: [facility] ENDO;  Service: Endoscopy;  Laterality: N/A;    LAPAROSCOPIC APPENDECTOMY N/A 7/10/2023    Procedure: APPENDECTOMY, LAPAROSCOPIC;  Surgeon: Ki De La Fuente MD;  Location: [facility] OR;  Service: General;  Laterality: N/A;    TUBAL LIGATION      [facility]      UPPER GASTROINTESTINAL ENDOSCOPY  10/20/2021       Social History     Socioeconomic History    Marital status:     Number of children: 3   Occupational History    Occupation: medical assistant     Comment: [employer]   Tobacco Use    Smoking status: Never    Smokeless tobacco: Never   Substance and Sexual Activity    Alcohol use: Not Currently     Comment: socially    Drug use: No   Social History Narrative    , 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 No chills. No fatigue. Denies weight loss. Denies weight gain.  HEENT: Denies headaches. Denies vision change. Denies eye pain. Denies double vision. Denies ear pain.   CV: Denies chest pain.   PULM: Denies of shortness of breath.  GI: Denies constipation. No diarrhea. No abdominal pain. Denies nausea. Denies vomiting. No blood in stool.  HEME: Denies bleeding problems.  : Denies urgency. No painful urination. No blood in urine.  MS: Denies joint stiffness. Denies joint swelling.  Denies back pain.  SKIN: Denies rash.   NEURO: Denies seizures. No weakness.  PSYCH:  Denies difficulty sleeping. No anxiety. Denies depression. No suicidal thoughts.       VITALS:   Vitals:    11/17/23 1457   BP: 131/82   Pulse: 86   Weight: 93.3 kg (205 lb 11 oz)   Height: 5' 5" (1.651 m)   PainSc:   5   PainLoc: Neck     Body mass index is 34.23 kg/m².       No data to display                PHYSICAL EXAM:   GENERAL: Well appearing, in no acute distress, alert and oriented x3.  PSYCH:  Mood and affect appropriate.  SKIN: Skin color, texture, turgor normal, no rashes or lesions.  HEENT:  Normocephalic, atraumatic. Cranial nerves " grossly intact.  NECK: Spurling's positive. No pain to palpation over the cervical paraspinous muscles. No pain to palpation over facets. No pain with neck flexion, but has pain with extension, or lateral flexion.   PULM: No evidence of respiratory difficulty, symmetric chest rise.  GI:  Non-distended  BACK: Normal range of motion. No pain to palpation over the spinous processes. No pain to palpation over facet joints. There is no pain with palpation over the sacroiliac joints bilaterally.   EXTREMITIES: No deformities, edema, or skin discoloration.   MUSCULOSKELETAL: Shoulder, hip, and knee provocative maneuvers are negative. No atrophy is noted.  NEURO: Sensation is equal and appropriate bilaterally. Bilateral right upper and lower extremity strength is normal and symmetric. LUE 4/5  strength. Bilateral upper and lower extremity coordination and muscle stretch reflexes are physiologic and symmetric. Plantar response are downgoing. Straight leg raising in the supine position is negative to radicular pain. Negative Anastasia's  GAIT: normal.      LABS:    Lab Results   Component Value Date    HGBA1C 5.4 05/28/2020       Lab Results   Component Value Date    WBC 9.58 07/09/2023    HGB 12.8 07/09/2023    HCT 38.3 07/09/2023    MCV 88 07/09/2023     07/09/2023             IMAGING:    I have personally reviewed the MRI cervical spine dated 10/16/23: Multilevel degenerative changes most pronounced C4-5 with C4-5 moderate central and right neural foraminal narrowing.       ASSESSMENT: 38 y.o. year old female with pain, consistent with:    Encounter Diagnosis   Name Primary?    Cervical radiculopathy        DISCUSSION: Priya Cornad is a  who comes to us with cervical radiculopathy     PLAN:  - I have stressed the importance of physical activity and a home exercise plan to help with pain and improve health.  - Patient can continue with medications for now since they are providing benefits,  using them appropriately, and without side effects.  - Counseled patient regarding the importance of activity modification and physical therapy.  - Interventions: Schedule for cervical interlaminar epidural steroid injection at C7-T1 to help with pain and to progress with a home exercise program. Explained the risks and benefits of the procedure in detail with the patient today in clinic along with alternative treatment options, and the patient elected to pursue the intervention at this time.    - Medications:Start increasing Neurontin 600mg gradually to twice a day to help with radicular pain., Start Mobic 7.5mg daily PRN with food to help with pain and inflammation. , and Start Zanaflex 4mg at bedtime PRN to help with muscular symptoms.    - Imaging: Reviewed available imaging with patient and answered any questions they had regarding study.  - Records: Reviewed/Obtain old records from outside physicians and imaging  - Follow up visit: return to clinic in 2-3      I would like to thank Blanche Coy, DOROTHY for the opportunity to assist in the care of this patient. We had a very nice visit and I look forward to continuing their care. Please let me know if I can be of further assistance.     Eric Jessica MD  11/17/2023

## 2023-11-17 NOTE — TELEPHONE ENCOUNTER
----- Message from Javier Jessica MD sent at 2023  3:14 PM CST -----  Regarding: Order for OSBALDO WILEY    Patient Name: OSBALDO WILEY([id number])  Sex: Female  : 1985      PCP: ESTELA ANTHONY    Center: [facility]     Types of orders made on 2023: Medications, Outpatient Referral, Procedure                                       Request    Order Date:2023  Ordering User:JAVIER JESSICA [[id number]]  Enc  ounter Provider:Javier Jessica MD [[id number]]  Authorizing Provider: Javier Jessica MD [[id number]  Department:[facility]IN [facility]NAGEMENT[[id number]]    Common Order Information  Procedure -> Epidural Injection (specify level) Cmt: C7/T1    Order Specific Information  Order: Procedure Order to Pain Management [Custom: [id number]]  Order #:          [id number]: 1 FUTURE    Priority: Routine  Class: Clinic Performed      Future Order Information      Expires on:2024            Expected by:2023                   Comment:IV sedation    Associated Diagnoses      M54.12 Cervical radiculopathy      M50.30 DDD (degenerative disc disease), cervical      Physician -> jessica         Is patient on anti-coagulants? -> No         Facility Name: -> [facility]         Follow-up: -> 2 weeks           Priority: Routine  Class:   Clinic Performed    Future Order Information      Expires on:2024            Expected by:2023                   Comment:IV sedation    Associated Diagnoses      M54.12 Cervical radiculopathy      M50.30 DDD (degenerative disc disease), cervical      Procedure -> Epidural Injection (specify level) Cmt: C7/T1        Physician -> jessica         Is patient on anti-coagulants? -> No         Facility Name: ->   [facility]         Follow-up: -> 2 weeks

## 2023-12-04 ENCOUNTER — TELEPHONE (OUTPATIENT)
Dept: PAIN MEDICINE | Facility: CLINIC | Age: 38
End: 2023-12-04
Payer: COMMERCIAL

## 2023-12-11 ENCOUNTER — TELEPHONE (OUTPATIENT)
Dept: PAIN MEDICINE | Facility: CLINIC | Age: 38
End: 2023-12-11
Payer: COMMERCIAL

## 2023-12-11 ENCOUNTER — PATIENT MESSAGE (OUTPATIENT)
Dept: PAIN MEDICINE | Facility: CLINIC | Age: 38
End: 2023-12-11
Payer: COMMERCIAL

## 2023-12-11 DIAGNOSIS — M54.12 CERVICAL RADICULOPATHY: Primary | ICD-10-CM

## 2023-12-11 NOTE — TELEPHONE ENCOUNTER
----- Message from Jeannie Almazan sent at 12/11/2023  8:10 AM CST -----  Regarding: injection  Pt calling to reschedule injection appt for anytime this week , pt has a family emergency     Confirmed patient's contact info below:  Contact Name: Priya Conrad  Phone Number: [phone]

## 2023-12-11 NOTE — TELEPHONE ENCOUNTER
----- Message from Eric Jessica MD sent at 12/11/2023  7:22 AM CST -----  Regarding: Reschedule  Good morning. This patient sent a message this am about rescheduling her procedure. Melissa, I am ok with her going to another provider if it gets her in sooner. Thanks

## 2023-12-12 ENCOUNTER — TELEPHONE (OUTPATIENT)
Dept: PAIN MEDICINE | Facility: CLINIC | Age: 38
End: 2023-12-12
Payer: COMMERCIAL

## 2023-12-18 NOTE — PRE-PROCEDURE INSTRUCTIONS
12/18 Unable to reach pt via phone. Left message with instructions along with a request for a call back. The following was sent to pt portal.     Dear Priya ,     You are scheduled for a procedure with Dr. Jose Metcalf on 12/19/2023.  Your scheduled arrival time is 08:00 am.  This arrival time is roughly 1 hour before your anticipated procedure time to allow sufficient time for pre-op..  Please wear comfortable clothes.  Most patients do not need to change into a gown.  Please do not wear a dress.  This procedure will take place at the [facility] at the corner of [facility] and [facility].  It is in the [place] next to [employer].  The address is:     [address] [place] [address].  [place] [id number]     After entering the building, you will proceed to the second floor where you can check in with registration. You should take any medications that you routinely take for blood pressure, heart medications, thyroid, cholesterol, etc.      The fasting restrictions are dependent on whether or not you are receiving sedation.  Sedation is not available for all procedures.      Your fasting instructions are as follow:  IV sedation. You should not eat for 8 hours and can only drink clear liquids (water or black coffee without cream/sugar) up until 2 hours before your scheduled time.  You CANNOT drive yourself and must have a .     If you are on blood thinners, you need to follow the anticoagulation instructions that had been discussed previously.  You should only stop the blood thinners if it was approved by your primary care physician or your cardiologist.  In the event that you are not able to stop your blood thinners, a blood thinner was not listed on your medication list, or we were not able to get clearance from your cardiologist, then the procedure may have to be postponed/canceled.      IF you were told to stop your blood thinners, this is how long you should  generally hold some of the more common ones.  Remember that stopping blood thinners is only necessary for certain procedures. If you are unsure of your instructions, please call us.   Aspirin - 5 days  Plavix/Clopidogrel - 7 days  Warfarin / Coumadin - 5 days  Eliquis - 3 days  Pradaxa/Dabigatran - 4 days  Xarelto/Rivaroxaban - 3 days     If you are a diabetic, do not take your medication if you will be fasting, but bring it with you. Please plan on being here for roughly 2 hours.     Please call us if you have been sick (running fever, having any flu-like symptoms) or have been taking antibiotics in the past 2 weeks or had any outpatient procedures other than with us (colonoscopy, endoscopy, OBGYN, dental, etc.). If you have been previously COVID positive, you will need to hold off on your procedure until you are symptom free for 10 days. If you did not have any symptoms, you can have your procedure 10 days from your positive test result.       *HOLD ALL VITAMINS, MINERALS, HERBS (INCLUDING HERBAL TEAS) AND SUPPLEMENTS  *SHOWER WITH ANTIBACTERIAL SOAP (EX. DIAL) NIGHT BEFORE AND MORNING OF PROCEDURE  *DO NOT APPLY ANY LOTIONS, OILS, POWDERS, PERFUME/COLOGNE, OINTMENTS, GELS, CREAMS, MAKEUP OR DEODORANT TO YOUR SKIN MORNING OF PROCEDURE  *LEAVE JEWELRY AND ANY VALUABLES AT HOME  *WEAR LOOSE COMFORTABLE CLOTHING (PREFERABLY A BUTTON UP SHIRT)        Thank you,  [facility], LPN [facility]  Pre-Admit

## 2023-12-19 ENCOUNTER — HOSPITAL ENCOUNTER (OUTPATIENT)
Facility: HOSPITAL | Age: 38
Discharge: HOME | End: 2023-12-19
Attending: STUDENT IN AN ORGANIZED HEALTH CARE EDUCATION/TRAINING PROGRAM | Admitting: EMERGENCY MEDICINE
Payer: COMMERCIAL

## 2023-12-19 VITALS
RESPIRATION RATE: 16 BRPM | HEIGHT: 65 IN | WEIGHT: 197 LBS | SYSTOLIC BLOOD PRESSURE: 134 MMHG | OXYGEN SATURATION: 100 % | HEART RATE: 96 BPM | TEMPERATURE: 98 F | BODY MASS INDEX: 32.82 KG/M2 | DIASTOLIC BLOOD PRESSURE: 89 MMHG

## 2023-12-19 DIAGNOSIS — M54.12 CERVICAL RADICULOPATHY: ICD-10-CM

## 2023-12-19 DIAGNOSIS — M50.30 DDD (DEGENERATIVE DISC DISEASE), CERVICAL: ICD-10-CM

## 2023-12-19 DIAGNOSIS — G89.29 CHRONIC PAIN: ICD-10-CM

## 2023-12-19 LAB
B-HCG UR QL: NEGATIVE
CTP QC/QA: YES

## 2023-12-19 PROCEDURE — 63600175 PHARM REV CODE 636 W HCPCS: Performed by: STUDENT IN AN ORGANIZED HEALTH CARE EDUCATION/TRAINING PROGRAM

## 2023-12-19 PROCEDURE — 25500020 PHARM REV CODE 255: Performed by: STUDENT IN AN ORGANIZED HEALTH CARE EDUCATION/TRAINING PROGRAM

## 2023-12-19 PROCEDURE — 62321 PR INJ CERV/THORAC, W/GUIDANCE: ICD-10-PCS | Mod: ,,, | Performed by: STUDENT IN AN ORGANIZED HEALTH CARE EDUCATION/TRAINING PROGRAM

## 2023-12-19 PROCEDURE — 62321 NJX INTERLAMINAR CRV/THRC: CPT | Mod: ,,, | Performed by: STUDENT IN AN ORGANIZED HEALTH CARE EDUCATION/TRAINING PROGRAM

## 2023-12-19 PROCEDURE — 25000003 PHARM REV CODE 250: Performed by: STUDENT IN AN ORGANIZED HEALTH CARE EDUCATION/TRAINING PROGRAM

## 2023-12-19 PROCEDURE — 81025 URINE PREGNANCY TEST: CPT | Performed by: STUDENT IN AN ORGANIZED HEALTH CARE EDUCATION/TRAINING PROGRAM

## 2023-12-19 PROCEDURE — 62321 NJX INTERLAMINAR CRV/THRC: CPT | Performed by: STUDENT IN AN ORGANIZED HEALTH CARE EDUCATION/TRAINING PROGRAM

## 2023-12-19 RX ORDER — MIDAZOLAM HYDROCHLORIDE 1 MG/ML
INJECTION INTRAMUSCULAR; INTRAVENOUS
Status: DISCONTINUED | OUTPATIENT
Start: 2023-12-19 | End: 2023-12-19 | Stop reason: HOSPADM

## 2023-12-19 RX ORDER — FENTANYL CITRATE 50 UG/ML
INJECTION, SOLUTION INTRAMUSCULAR; INTRAVENOUS
Status: DISCONTINUED | OUTPATIENT
Start: 2023-12-19 | End: 2023-12-19 | Stop reason: HOSPADM

## 2023-12-19 RX ORDER — LIDOCAINE HYDROCHLORIDE 20 MG/ML
INJECTION, SOLUTION EPIDURAL; INFILTRATION; INTRACAUDAL; PERINEURAL
Status: DISCONTINUED | OUTPATIENT
Start: 2023-12-19 | End: 2023-12-19 | Stop reason: HOSPADM

## 2023-12-19 RX ORDER — DEXAMETHASONE SODIUM PHOSPHATE 10 MG/ML
INJECTION INTRAMUSCULAR; INTRAVENOUS
Status: DISCONTINUED | OUTPATIENT
Start: 2023-12-19 | End: 2023-12-19 | Stop reason: HOSPADM

## 2023-12-19 RX ORDER — TIZANIDINE HYDROCHLORIDE 4 MG/1
4 CAPSULE, GELATIN COATED ORAL DAILY PRN
COMMUNITY
End: 2024-03-13

## 2023-12-19 NOTE — DISCHARGE SUMMARY
[facility]  Discharge Note  Short Stay    Procedure(s) (LRB):  C7-T1 LIZANDRO (N/A)      OUTCOME: Patient tolerated treatment/procedure well without complication and is now ready for discharge.    DISPOSITION: Home or Self Care    FINAL DIAGNOSIS:  <principal problem not specified>    FOLLOWUP: In clinic    DISCHARGE INSTRUCTIONS:  No discharge procedures on file.     TIME SPENT ON DISCHARGE: 10 minutes

## 2023-12-19 NOTE — OP NOTE
"PROCEDURE:  CERVICAL C7-T1 INTERLAMINAR EPIDURAL STEROID INJECTION    Patient Name: Priya Conrad  MRN: [id number]  DATE OF PROCEDURE: 12/19/2023    DIAGNOSIS: Cervical Radiculopathy  CPT CODE: 21616      POSTPROCEDURE DIAGNOSIS: Same    PHYSICIAN: Jose Metcalf DO  NEEDLE TYPE: - 20G 3.5" Touhy Needle  LOCAL ANESTHETIC INJECTED: Xylocaine 2%   MEDICATIONS INJECTED: 4cc mixture of 3cc Normal Saline + 10mg Dexamethasone (10mg/ml)  CONTRAST: Omni 300  LOSS OF RESISTANCE DEPTH: 7 cm    Sedation Medications - Mild Sedation with 2mg Versed and 25mcg Fentanyl.    Conscious sedation ordered by M.D. Patient re-evaluation prior to administration of conscious sedation. No changes noted in patient's status from initial evaluation. The patient's vital signs were monitored by RN and patient remained hemodynamically stable throughout the procedure.    Event Time In   Sedation Start 1019   Sedation End 1025       Estimated Blood Loss - <2ml  Drains: None  Specimens Removed: None  Urine Output - Not Measured  Complications: None  Outcome: Good    Informed Consent:  The patient's condition and proposed procedures, risks, and alternatives were discussed with the patient or responsible party.  The patient's / responsible party's questions were answered.   The patient / responsible party appeared to understand and chose to proceed.  Informed consent was obtained.  After obtaining written consent, an IV hep lock was placed. (See nurses notes for details).     Procedure in Detail:  The patient was taken back to the OR suite and placed in a prone position. The skin overlying the injection site was prepped and draped in an aseptic fashion. The target injection site (see above) was identified with fluoroscopy and marked.     Procedural Pause:  A procedural pause verifying correct patient, medical record number, allergies, medications to be administered, current vital signs, and surgical site was performed immediately prior to " beginning the procedure.    With the patient laying in a prone position, the surgical area was prepped and draped in the usual sterile fashion using ChloraPrep and a fenestrated drape. The level was determined under fluoroscopy guidance. Skin anesthesia was achieved by injecting Lidocaine 2% over the injection site.  The interlaminar space was then approached with a 20 gauge, 3.5 inch Tuohy needle that was introduced under fluoroscopic guidance with AP, lateral and/or contralateral oblique imaging. Once the Ligamentum flavum was encountered loss of resistance to saline was used to enter the epidural space. With positive loss of resistance and negative aspiration for CSF or Blood, contrast dye  Omnipaque (300mg/mL) was injected to confirm placement and there was no vascular runoff. Then 4 mL of the medication mixture listed above was then injected slowly. Displacement of the radio opaque contrast after injection of the medication confirmed that the medication went into the area of the epidural space. The needles were removed, and bleeding was nil. A sterile dressing was applied. No specimens collected. The patient tolerated the procedure well.     The heart rate, pulse oximetry, and blood pressure were continuously monitored throughout the procedure.  The procedure was well tolerated. She was carefully escorted to the recovery room in stable condition. Patient was monitored by RN for recovery period.  The patient will be contacted in the next few days to determine extent of relief.  Patient was given post procedure and discharge instructions to follow at home.  The patient was discharged in a stable condition.    Note Electronically Signed By:  Jose Lux

## 2023-12-19 NOTE — PLAN OF CARE
Discharge instructions given and explained to patient with verbalization of understanding all instructions. Patients v/s stable, denies n/v and tolerating po, rates pain level tolerable, IV removed, pt ambulated in bay. No distress noted. No c/o numbness or tingling. Escorted pt via wheelchair.

## 2023-12-19 NOTE — DISCHARGE INSTRUCTIONS
[facility]  [facility]  Informed Trades service # [phone]    POST-PROCEDURE INSTRUCTIONS:    Today you had an injection that included a steroid medications.  The steroid may or may not have been mixed with a local anesthetic when it was injected.   If the injection was in the neck, you may feel some pressure, numbness, or slight weakness in the arm after the procedure for a short period of time (this is a normal response), if this persists for longer than 1 day please contact our office or go to the emergency room.  If the injection was in the low back, you may feel some pressure, numbness, or slight weakness in the leg after the procedure for a short period of time (this is a normal response), if this persists for longer than 1 day please contact our office or go to the emergency room.  You may get side effects from the steroid.  This is not uncommon.  Symptoms include: elevated blood sugar, elevated blood pressure, headache, flushing, nausea, insomnia.  These symptoms are transient and will resolve within 1-3 days.  If symptoms last longer than this please contact our office or head to the emergency room.  Steroid medications can take anywhere from 3-14 days to take effect (rarely longer).  You may notice that your pain worsens for a short period of time after the injection, this would not be unusual due to the pressure and trauma from the needle.    If you do not have a follow up appointment scheduled, please contact my office (or the office of the physician who referred you for the procedure) to get a post-procedure follow up scheduled 2-4 weeks after the procedure.  This can be done as a virtual visit if that is more convenient for you.      What you need to do:    Keep a record of your response to the injection you had today.    How much relief did you get?   When did the relief start and how long did it last?  Were you able to decrease the use of any of your pain  medications?  Were you able to increase your level of activity?  How long did the relief last?    What to watch out for:    If you experience any of the following symptoms after your procedure, please notify the messaging service immediately (see above for contact information):   fever (increased oral temperature)   bleeding or swelling at the injection site,    drainage, rash or redness at the injection site    possible signs of infection    increased pain at the injection site   worsening of your usual pain   severe headache   new or worsening numbness    new arm and/or leg weakness, or    changes in bowel and/or bladder function: urinating or defecating on yourself and not knowing that you did it.    PLEASE FOLLOW ALL INSTRUCTIONS CAREFULLY     Do not engage in strenuous activity (e.g., lifting or pushing heavy objects or repeated bending) for 24 hours.     Do not take a bath, swim or use Jacuzzi for 24 hours after procedure. (A shower is fine).   Remove any Band-Aids when you get home.    Use cold/ice, as needed for comfort.  We recommend the use of cold therapy alternating on for 20 minutes, off for 20 minutes.    Do not apply direct heat (heating pad or heat packs) to the injection site for 24 hours.     Resume your usual medications, unless instructed otherwise by your Pain Physician.     If you are on warfarin (Coumadin) or other blood thinner, resume this medication as instructed by your prescribing Physician.    IF AT ANY POINT YOU ARE VERY CONCERNED ABOUT YOUR SYMPTOMS, PLEASE GO TO THE EMERGENCY ROOM.    If you develop worsening pain, weakness, numbness, lose bowel or bladder control (i.e., having an accident where you did not even know you had to go to the bathroom and suddenly noticed you soiled yourself), saddle anesthesia (a loss of sensation restricted to the area of the buttocks, anus and between the legs -- i.e., those parts of your body that would touch a saddle if you were sitting on one) you  need to go immediately to the emergency department for evaluation and treatment.    ----------------------------------------------------------------------------------------------------------------------------------------------------------------  If you received Sedation please read the following instructions:  POST SEDATION INSTRUCTIONS    Today you received intravenous medication (also known as sedation) that was used to help you relax and/or decrease discomfort during your procedure. This medication will be acting in your body for the next 24 hours, so you might feel a little tired or sleepy. This feeling will slowly wear off.   Common side effects associated with these medications include: drowsiness, dizziness, sleepiness, confusion, feeling excited, difficulty remembering things, lack of steadiness with walking or balance, loss of fine muscle control, slowed reflexes, difficulty focusing, and blurred vision.  Some over-the-counter and prescription medications (e.g., muscle relaxants, opioids, mood-altering medications, sedatives/hypnotics, antihistamines) can interact with the intravenous medication you received and cause an increased risk of the side effects listed above in addition to other potentially life threatening side effects. Use extreme caution if you are taking such medications, and consult with your Pain Physician or prescribing physician if you have any questions.  For the next 12-24 hours:    DO NOT--Drive a car, operate machinery or power tools   DO NOT--Drink any alcoholic beverages (not even beer), they may dangerously increase the risk of side effects.    DO NOT--Make any important legal or business decisions or sign important documents.  We advise you to have someone to assist you at home. Move slowly and carefully. Do not make sudden changes in position. Be aware of dizziness or light-headedness and move accordingly.   If you seek medical treatment within 24 hours, let the nurse or doctor  caring for you know that you have received the above medications. If you have any questions or concerns related to your sedation or treatment today please contact us.

## 2023-12-19 NOTE — H&P
HPI  Patient presenting for Procedure(s) (LRB):  C7-T1 LIZANDRO (N/A)     Patient on Anti-coagulation No    No health changes since previous encounter    Past Medical History:   Diagnosis Date    Anxiety     Heart murmur     Hypertension     Migraine      Past Surgical History:   Procedure Laterality Date     SECTION      COLONOSCOPY  10/20/2021    COLONOSCOPY N/A 10/20/2021    Procedure: COLONOSCOPY 2 day golytely;  Surgeon: Zheng Woodruff MD;  Location: [facility] ENDO;  Service: Endoscopy;  Laterality: N/A;    EPIDURAL STEROID INJECTION INTO CERVICAL SPINE N/A 2023    Procedure: C7-T1 LIZANDRO;  Surgeon: Eric Jessica MD;  Location: [facility] PAIN MANAGEMENT;  Service: Pain Management;  Laterality: N/A;  30 mins    ESOPHAGOGASTRODUODENOSCOPY N/A 10/20/2021    Procedure: EGD (ESOPHAGOGASTRODUODENOSCOPY);  Surgeon: Zheng Woodruff MD;  Location: [facility] ENDO;  Service: Endoscopy;  Laterality: N/A;    LAPAROSCOPIC APPENDECTOMY N/A 7/10/2023    Procedure: APPENDECTOMY, LAPAROSCOPIC;  Surgeon: Ki De La Fuente MD;  Location: [facility] OR;  Service: General;  Laterality: N/A;    [facility]      UPPER GASTROINTESTINAL ENDOSCOPY  10/20/2021     Review of patient's allergies indicates:  No Known Allergies   No current facility-administered medications for this encounter.       PMHx, PSHx, Allergies, Medications reviewed in epic    ROS negative except pain complaints in HPI    OBJECTIVE:    There were no vitals taken for this visit.    PHYSICAL EXAMINATION:    GENERAL: Well appearing, in no acute distress, alert and oriented x3.  PSYCH:  Mood and affect appropriate.  SKIN: Skin color, texture, turgor normal, no rashes or lesions which will impact the procedure.  CV: RRR with palpation of the radial artery.  PULM: No evidence of respiratory difficulty, symmetric chest rise. Clear to auscultation.  NEURO: Cranial nerves grossly intact.    Plan:    Proceed with procedure as planned Procedure(s) (LRB):  C7-T1 LIZANDRO  (N/A)    Jose Lux  12/19/2023

## 2024-01-02 ENCOUNTER — TELEPHONE (OUTPATIENT)
Dept: GASTROENTEROLOGY | Facility: CLINIC | Age: 39
End: 2024-01-02
Payer: COMMERCIAL

## 2024-01-02 NOTE — TELEPHONE ENCOUNTER
Spoke with pt and she complain od abd pain. Scheduled pt clinic visit with NP. Verbal understanding

## 2024-01-04 ENCOUNTER — HOSPITAL ENCOUNTER (EMERGENCY)
Facility: HOSPITAL | Age: 39
Discharge: HOME | End: 2024-01-05
Attending: EMERGENCY MEDICINE
Payer: COMMERCIAL

## 2024-01-04 VITALS
OXYGEN SATURATION: 100 % | WEIGHT: 197 LBS | HEIGHT: 65 IN | RESPIRATION RATE: 18 BRPM | SYSTOLIC BLOOD PRESSURE: 126 MMHG | BODY MASS INDEX: 32.82 KG/M2 | DIASTOLIC BLOOD PRESSURE: 82 MMHG | HEART RATE: 94 BPM | TEMPERATURE: 99 F

## 2024-01-04 DIAGNOSIS — M54.12 CERVICAL RADICULOPATHY AT C8: Primary | ICD-10-CM

## 2024-01-04 DIAGNOSIS — J06.9 VIRAL URI WITH COUGH: ICD-10-CM

## 2024-01-04 PROCEDURE — 87651 STREP A DNA AMP PROBE: CPT | Mod: ER | Performed by: EMERGENCY MEDICINE

## 2024-01-04 PROCEDURE — 87502 INFLUENZA DNA AMP PROBE: CPT | Mod: ER | Performed by: EMERGENCY MEDICINE

## 2024-01-04 PROCEDURE — U0002 COVID-19 LAB TEST NON-CDC: HCPCS | Mod: ER | Performed by: EMERGENCY MEDICINE

## 2024-01-04 PROCEDURE — 99284 EMERGENCY DEPT VISIT MOD MDM: CPT | Mod: ER

## 2024-01-04 PROCEDURE — 81025 URINE PREGNANCY TEST: CPT | Mod: ER | Performed by: EMERGENCY MEDICINE

## 2024-01-05 LAB
B-HCG UR QL: NEGATIVE
CTP QC/QA: YES
GROUP A STREP, MOLECULAR: NEGATIVE
INFLUENZA A, MOLECULAR: NEGATIVE
INFLUENZA B, MOLECULAR: NEGATIVE
SARS-COV-2 RDRP RESP QL NAA+PROBE: NEGATIVE
SPECIMEN SOURCE: NORMAL

## 2024-01-05 RX ORDER — FLUTICASONE PROPIONATE 50 MCG
1 SPRAY, SUSPENSION (ML) NASAL 2 TIMES DAILY PRN
Qty: 15 G | Refills: 0 | Status: SHIPPED | OUTPATIENT
Start: 2024-01-05 | End: 2024-01-15

## 2024-01-05 RX ORDER — BENZONATATE 100 MG/1
200 CAPSULE ORAL 3 TIMES DAILY PRN
Qty: 20 CAPSULE | Refills: 0 | Status: SHIPPED | OUTPATIENT
Start: 2024-01-05 | End: 2024-01-15

## 2024-01-05 RX ORDER — MELOXICAM 7.5 MG/1
7.5 TABLET ORAL DAILY
Qty: 7 TABLET | Refills: 0 | Status: SHIPPED | OUTPATIENT
Start: 2024-01-05 | End: 2024-01-12

## 2024-01-05 RX ORDER — CETIRIZINE HYDROCHLORIDE 10 MG/1
10 TABLET ORAL DAILY
Qty: 10 TABLET | Refills: 0 | Status: SHIPPED | OUTPATIENT
Start: 2024-01-05 | End: 2024-01-29

## 2024-01-05 NOTE — ED PROVIDER NOTES
"ED Provider Note - 2024    History     Chief Complaint   Patient presents with    Multiple Complaints     Pt to the Er states she has "pain up and down left arm, fullness in chest, pain across back of neck, nausea, sore throat, and right ear pain." No recorded temp, did not check. No Otc medications. Pt reports symptoms throat and ear 2 days - arm and other pain today.      Patient currently presents with concern regarding viral symptoms.  Onset noted 2-3 days ago.  Symptoms include congestion, cough, and sore throat.  Patient/family denies associated SOB.  Patient/family reports nausea associated with this process.  Denies vomiting and diarrhea  Patient/family is not aware of recent ill contacts with similar symptoms.  Patient does report additional concern regarding pain radiating to the left upper extremity.  Patient notes that she has a history of cervical spine disease which has previously left her with similar symptoms.      Review of patient's allergies indicates:  No Known Allergies  Past Medical History:   Diagnosis Date    Anxiety     Heart murmur     Hypertension     Migraine      Past Surgical History:   Procedure Laterality Date     SECTION      COLONOSCOPY  10/20/2021    COLONOSCOPY N/A 10/20/2021    Procedure: COLONOSCOPY 2 day golytely;  Surgeon: Zheng Woodruff MD;  Location: [facility];  Service: Endoscopy;  Laterality: N/A;    EPIDURAL STEROID INJECTION INTO CERVICAL SPINE N/A 2023    Procedure: C7-T1 LIZANDRO;  Surgeon: Eric Jessica MD;  [facility];  Service: Pain Management;  Laterality: N/A;  30 mins    EPIDURAL STEROID INJECTION INTO CERVICAL SPINE N/A 2023    Procedure: C7-T1 LIZANDRO;  Surgeon: Jose Metcalf DO;  [facility];  Service: Pain Management;  Laterality: N/A;  15 mins    ESOPHAGOGASTRODUODENOSCOPY N/A 10/20/2021    Procedure: EGD (ESOPHAGOGASTRODUODENOSCOPY);  Surgeon: Zheng Woodruff MD;  Location: [facility];  " Service: Endoscopy;  Laterality: N/A;    LAPAROSCOPIC APPENDECTOMY N/A 7/10/2023    Procedure: APPENDECTOMY, LAPAROSCOPIC;  Surgeon: Ki De La Fuente MD;  Location: [facility] OR;  Service: General;  Laterality: N/A;    TUBAL LIGATION      [facility]      UPPER GASTROINTESTINAL ENDOSCOPY  10/20/2021     Family History   Problem Relation Age of Onset    Hypertension Mother     Breast cancer Paternal Aunt     Heart failure Maternal Grandmother     Heart failure Maternal Grandfather      Social History     Tobacco Use    Smoking status: Never    Smokeless tobacco: Never   Substance Use Topics    Alcohol use: Not Currently     Comment: socially    Drug use: No     Review of Systems   Constitutional:  Negative for chills and fever.   HENT:  Positive for congestion, postnasal drip and rhinorrhea.    Respiratory:  Positive for cough. Negative for shortness of breath.    Cardiovascular:  Negative for chest pain and palpitations.   Gastrointestinal:  Negative for abdominal pain, diarrhea and vomiting.   Genitourinary:  Negative for difficulty urinating and dysuria.   Musculoskeletal:  Positive for neck pain.   Skin:  Negative for color change and rash.   Neurological:  Negative for dizziness and light-headedness.   Hematological:  Negative for adenopathy. Does not bruise/bleed easily.     Physical Exam     Initial Vitals [01/04/24 2349]   BP Pulse Resp Temp SpO2   126/82 94 18 98.8 °F (37.1 °C) 100 %      MAP       --         Physical Exam    Nursing note and vitals reviewed.  Constitutional: She appears well-developed and well-nourished. She is not diaphoretic. No distress.   HENT:   Head: Normocephalic and atraumatic.   Nose: Nose normal.   Mouth/Throat: Oropharynx is clear and moist.   Eyes: Conjunctivae are normal. No scleral icterus.   Cardiovascular:  Normal rate, regular rhythm, normal heart sounds and intact distal pulses.           Pulmonary/Chest: Breath sounds normal. No respiratory distress.   Abdominal: Abdomen is  soft. She exhibits no distension. There is no abdominal tenderness.   Musculoskeletal:         General: No edema. Normal range of motion.     Neurological: She is alert and oriented to person, place, and time. She has normal strength.   Skin: Skin is warm and dry.         ED Course   Procedures                   MDM  Differential Diagnoses   Based on available history, the working differential diagnoses considered during this evaluation include but are not limited to viral syndrome including influenza and Covid 19, bronchitis, pneumonia, and sinusitis.      LABS     Labs Reviewed   INFLUENZA A & B BY MOLECULAR   GROUP A STREP, MOLECULAR   SARS-COV-2 RNA AMPLIFICATION, QUAL    Narrative:     Is the patient symptomatic?->Yes   POCT URINE PREGNANCY           All available results from the labs ordered were independently reviewed. with findings as follows:  Screening for influenza, COVID, and strep negative.  Pregnancy test negative.     Imaging     Imaging Results    None                EKG        ED Management/Discussion   Medications - No data to display                The patient's list of active medical problems, social history, medications, and allergies as documented per RN staff has been reviewed.             All findings were reviewed in detail along with the diagnosis of a respiratory virus and cervical radiculopathy.  Patient/family has been counseled regarding use of an appropriate antihistamine and expectorant/antitussive agents for symptomatic relief along with nasal steroid formulations pending resolution and PCP follow-up.  Patient's symptoms appear to be localized to the C8 dermatome consistent with patient's prior pathology.  I see no indication of acute neurological deficits.    On final assessment, the patient appears suitable for discharge.  I see no indication of an emergent process beyond that addressed during our encounter but have duly counseled the patient/family regarding the need for prompt  follow-up as well as the indications that should prompt immediate return to the emergency room.  The patient/family has been provided with language -specific verbal and printed direction regarding our final diagnosis(es) as well as instructions regarding use of OTC and/or Rx medications intended to manage the patient's aforementioned conditions including:  ED Prescriptions       Medication Sig Dispense Start Date End Date Auth. Provider    cetirizine (ZYRTEC) 10 MG tablet Take 1 tablet (10 mg total) by mouth once daily. for 10 days 10 tablet 1/5/2024 1/15/2024 Jona Bello MD    benzonatate (TESSALON) 100 MG capsule Take 2 capsules (200 mg total) by mouth 3 (three) times daily as needed for Cough. 20 capsule 1/5/2024 1/15/2024 Jona Bello MD    fluticasone propionate (FLONASE) 50 mcg/actuation nasal spray 1 spray (50 mcg total) by Each Nostril route 2 (two) times daily as needed for Rhinitis. 15 g 1/5/2024 1/15/2024 Jona Bello MD    meloxicam (MOBIC) 7.5 MG tablet Take 1 tablet (7.5 mg total) by mouth once daily. for 7 days 7 tablet 1/5/2024 1/12/2024 Jona Bello MD              Patient has been advised of the following recommended follow-up instructions:  Follow-up Information       Follow up With Specialties Details Why Contact Info    Vinny Rose MD Internal Medicine Schedule an appointment as soon as possible for a visit  for reassessment [address] [address] [phone]      [facility] - Emergency Dept Emergency Medicine Go to  As needed, If symptoms worsen [address] [facility] [address] [phone]          The patient/family communicates understanding of all this information and all remaining questions and concerns were addressed at this time.      Referrals:  No orders of the defined types were placed in this encounter.      CLINICAL IMPRESSION    ICD-10-CM ICD-9-CM   1. Cervical radiculopathy at C8  M54.12 723.4   2.  Viral URI with cough  J06.9 465.9          ED Disposition Condition    Discharge Stable                 Jona Bello MD  01/05/24 0247

## 2024-01-08 ENCOUNTER — TELEPHONE (OUTPATIENT)
Dept: GASTROENTEROLOGY | Facility: CLINIC | Age: 39
End: 2024-01-08
Payer: COMMERCIAL

## 2024-01-22 ENCOUNTER — PATIENT MESSAGE (OUTPATIENT)
Dept: PAIN MEDICINE | Facility: CLINIC | Age: 39
End: 2024-01-22
Payer: COMMERCIAL

## 2024-01-22 ENCOUNTER — TELEPHONE (OUTPATIENT)
Dept: PAIN MEDICINE | Facility: CLINIC | Age: 39
End: 2024-01-22
Payer: COMMERCIAL

## 2024-01-22 ENCOUNTER — PATIENT MESSAGE (OUTPATIENT)
Dept: SURGERY | Facility: CLINIC | Age: 39
End: 2024-01-22
Payer: COMMERCIAL

## 2024-01-22 NOTE — TELEPHONE ENCOUNTER
I spoke with Ms. Priya and relayed your message. She is ok with following up with CARLA Coy NP. Thank you.

## 2024-01-29 ENCOUNTER — OFFICE VISIT (OUTPATIENT)
Dept: OBSTETRICS AND GYNECOLOGY | Facility: CLINIC | Age: 39
End: 2024-01-29
Payer: COMMERCIAL

## 2024-01-29 VITALS
WEIGHT: 198 LBS | SYSTOLIC BLOOD PRESSURE: 124 MMHG | HEIGHT: 65 IN | DIASTOLIC BLOOD PRESSURE: 76 MMHG | BODY MASS INDEX: 32.99 KG/M2

## 2024-01-29 DIAGNOSIS — R10.2 PELVIC PAIN: Primary | ICD-10-CM

## 2024-01-29 DIAGNOSIS — Z11.3 SCREEN FOR STD (SEXUALLY TRANSMITTED DISEASE): ICD-10-CM

## 2024-01-29 LAB
B-HCG UR QL: NEGATIVE
CTP QC/QA: YES

## 2024-01-29 PROCEDURE — 99999 PR PBB SHADOW E&M-EST. PATIENT-LVL III: CPT | Mod: PBBFAC,,,

## 2024-01-29 PROCEDURE — 3074F SYST BP LT 130 MM HG: CPT | Mod: CPTII,S$GLB,,

## 2024-01-29 PROCEDURE — 99213 OFFICE O/P EST LOW 20 MIN: CPT | Mod: S$GLB,,,

## 2024-01-29 PROCEDURE — 87491 CHLMYD TRACH DNA AMP PROBE: CPT

## 2024-01-29 PROCEDURE — 3078F DIAST BP <80 MM HG: CPT | Mod: CPTII,S$GLB,,

## 2024-01-29 PROCEDURE — 1159F MED LIST DOCD IN RCRD: CPT | Mod: CPTII,S$GLB,,

## 2024-01-29 PROCEDURE — 81025 URINE PREGNANCY TEST: CPT | Mod: S$GLB,,,

## 2024-01-29 PROCEDURE — 3008F BODY MASS INDEX DOCD: CPT | Mod: CPTII,S$GLB,,

## 2024-01-29 RX ORDER — PLECANATIDE 3 MG/1
1 TABLET ORAL
COMMUNITY
Start: 2023-12-27 | End: 2024-03-13

## 2024-01-29 NOTE — PROGRESS NOTES
Subjective:      Patient ID: Priya Conrad is a 38 y.o. female.    Chief Complaint:  Pelvic Pain      History of Present Illness  HPI  Abdominal Pain    Patient returns to clinic today with complaint of lower abdominal/pelvic pain which radiates to the right side. Acute exacerbation onset about 3 weeks ago. Pain is intermittent, described as ache, occasional sharp.    Pertinent history of chronic constipation. Will sometimes go a month without a bowel movement. Has been evaluated by GI, and colorectal surgeon MAURI Castro who recommended consideration of total colectomy. Typical medications prescribed have not been helping. Patient has appointment for GI surgery consult this Thursday. Patient wants to r/o GYN causes for her pain.     States she has history of fibroid uterus. Notes her last 3 menses have been heavier, and with dime to quarter sized blood clots which is abnormal for her.     Gc swab desired    Annual due in 2024, sees Dr Parker, was prescribed Lysteda for HMB, but not using. Per Dr Parker     2. Discussed treatment for fibroids and can't do estrogen with her HTN being uncontrolled, will get CBC iron and TSH and discussed progesterone only choices and lysteda. Would recommend exhausting all choices before surgery since has had several abdominal surgeries. She would like to try lysteda.        BP is 124/76 today    No c/o of dysuria or dyspareunia       Pelvic US 5/10/2022:     There is a 25 mm hypoechoic mass in the anterior aspect of the body of the uterus.  There are several cysts in the cervical portion of the uterus, consistent with nabothian cysts.  One of the larger ones measures 24 mm.          GYN & OB History  Patient's last menstrual period was 2024 (exact date).   Date of Last Pap: 2020    OB History    Para Term  AB Living   3 3 3     3   SAB IAB Ectopic Multiple Live Births                  # Outcome Date GA Lbr Tacos/2nd Weight Sex Delivery  Anes PTL Lv   3 Term            2 Term            1 Term                Review of Systems  Review of Systems   Constitutional:  Negative for chills, fatigue and fever.   Genitourinary:  Positive for menorrhagia, menstrual problem and pelvic pain. Negative for dysmenorrhea, dyspareunia, dysuria, frequency, urgency, vaginal discharge, vaginal pain, urinary incontinence and vaginal odor.   All other systems reviewed and are negative.         Objective:     Physical Exam:   Constitutional: She is oriented to person, place, and time. She appears well-developed and well-nourished. No distress.    HENT:   Head: Normocephalic and atraumatic.    Eyes: Pupils are equal, round, and reactive to light. Conjunctivae and EOM are normal.     Cardiovascular:  Normal rate.             Pulmonary/Chest: Effort normal.        Abdominal: Soft. She exhibits no distension. There is no abdominal tenderness. There is no rebound and no guarding. Hernia confirmed negative in the right inguinal area and confirmed negative in the left inguinal area.     Genitourinary:    Inguinal canal, uterus, right adnexa and left adnexa normal.   Rectum:      No external hemorrhoid.      Pelvic exam was performed with patient supine.   The external female genitalia was normal.   No external genitalia lesions identified,Genitalia hair distrobution normal .     Labial bartholins normal.There is no rash, tenderness, lesion or injury on the right labia. There is no rash, tenderness, lesion or injury on the left labia. Cervix is normal. Right adnexum displays no mass, no tenderness and no fullness. Left adnexum displays no mass, no tenderness and no fullness. No erythema, vaginal discharge, tenderness or bleeding in the vagina.    No foreign body in the vagina.      No signs of injury in the vagina.   Vagina was moist.Cervix exhibits no motion tenderness, no lesion, no friability, no tenderness and no polyp. Uerus contour normal  Uterus is not enlarged and not  tender. Normal urethral meatus.Bladder findings: no bladder distention and no bladder tenderness          Musculoskeletal: Normal range of motion and moves all extremeties.      Lymphadenopathy: No inguinal adenopathy noted on the right or left side.    Neurological: She is alert and oriented to person, place, and time.    Skin: Skin is warm and dry. No rash noted. She is not diaphoretic. No erythema. No pallor.    Psychiatric: She has a normal mood and affect. Her behavior is normal. Judgment and thought content normal.         Assessment:     1. Pelvic pain    2. Screen for STD (sexually transmitted disease)         Upt negative, history of BTL       Plan:     Pelvic pain  -     US Pelvis Comp with Transvag NON-OB (xpd; Future; Expected date: 01/29/2024  -     POCT Urine Pregnancy  - Bimanual exam notably negative. Fullness noted in colon otherwise wnl. F/u with GI as planned    US to rule out GYN issues r/t pelvic pain      Screen for STD (sexually transmitted disease)  -     C. trachomatis/N. gonorrhoeae by AMP DNA [facility]; Vagina              Follow up in about 3 months (around 4/29/2024) for annual exam.

## 2024-01-30 ENCOUNTER — TELEPHONE (OUTPATIENT)
Dept: SURGERY | Facility: CLINIC | Age: 39
End: 2024-01-30
Payer: COMMERCIAL

## 2024-01-30 ENCOUNTER — HOSPITAL ENCOUNTER (OUTPATIENT)
Dept: RADIOLOGY | Facility: HOSPITAL | Age: 39
Discharge: HOME | End: 2024-01-30
Payer: COMMERCIAL

## 2024-01-30 DIAGNOSIS — R10.2 PELVIC PAIN: ICD-10-CM

## 2024-01-30 PROCEDURE — 76830 TRANSVAGINAL US NON-OB: CPT | Mod: 26,,, | Performed by: RADIOLOGY

## 2024-01-30 PROCEDURE — 76856 US EXAM PELVIC COMPLETE: CPT | Mod: 26,,, | Performed by: RADIOLOGY

## 2024-01-30 PROCEDURE — 76830 TRANSVAGINAL US NON-OB: CPT | Mod: TC,PN

## 2024-02-01 ENCOUNTER — PATIENT MESSAGE (OUTPATIENT)
Dept: OBSTETRICS AND GYNECOLOGY | Facility: CLINIC | Age: 39
End: 2024-02-01
Payer: COMMERCIAL

## 2024-02-01 LAB
C TRACH DNA SPEC QL NAA+PROBE: NOT DETECTED
N GONORRHOEA DNA SPEC QL NAA+PROBE: NOT DETECTED

## 2024-02-01 NOTE — PROGRESS NOTES
Uterus and uterine fibroid have gotten larger since May 2022. Both this and known chronic GI issues can play a part in lower abdominal discomfort. I recommend following up with GI as well as an appointment with Dr Kraft (or female MD of pt choosing) to discuss treatment options.

## 2024-02-02 ENCOUNTER — OFFICE VISIT (OUTPATIENT)
Dept: SURGERY | Facility: CLINIC | Age: 39
End: 2024-02-02
Payer: COMMERCIAL

## 2024-02-02 VITALS
SYSTOLIC BLOOD PRESSURE: 144 MMHG | BODY MASS INDEX: 32.8 KG/M2 | HEIGHT: 65 IN | WEIGHT: 196.88 LBS | DIASTOLIC BLOOD PRESSURE: 94 MMHG | HEART RATE: 70 BPM

## 2024-02-02 DIAGNOSIS — K59.01 SLOW TRANSIT CONSTIPATION: Primary | ICD-10-CM

## 2024-02-02 PROCEDURE — 1159F MED LIST DOCD IN RCRD: CPT | Mod: CPTII,S$GLB,, | Performed by: COLON & RECTAL SURGERY

## 2024-02-02 PROCEDURE — 99999 PR PBB SHADOW E&M-EST. PATIENT-LVL IV: CPT | Mod: PBBFAC,,, | Performed by: COLON & RECTAL SURGERY

## 2024-02-02 PROCEDURE — 3077F SYST BP >= 140 MM HG: CPT | Mod: CPTII,S$GLB,, | Performed by: COLON & RECTAL SURGERY

## 2024-02-02 PROCEDURE — 3080F DIAST BP >= 90 MM HG: CPT | Mod: CPTII,S$GLB,, | Performed by: COLON & RECTAL SURGERY

## 2024-02-02 PROCEDURE — 3008F BODY MASS INDEX DOCD: CPT | Mod: CPTII,S$GLB,, | Performed by: COLON & RECTAL SURGERY

## 2024-02-02 PROCEDURE — 99214 OFFICE O/P EST MOD 30 MIN: CPT | Mod: S$GLB,,, | Performed by: COLON & RECTAL SURGERY

## 2024-02-02 PROCEDURE — 1160F RVW MEDS BY RX/DR IN RCRD: CPT | Mod: CPTII,S$GLB,, | Performed by: COLON & RECTAL SURGERY

## 2024-02-02 RX ORDER — CIPROFLOXACIN 500 MG/1
500 TABLET ORAL 2 TIMES DAILY
Qty: 2 TABLET | Refills: 0 | Status: SHIPPED | OUTPATIENT
Start: 2024-02-02 | End: 2024-02-03

## 2024-02-02 RX ORDER — METRONIDAZOLE 500 MG/1
500 TABLET ORAL 2 TIMES DAILY
Qty: 2 TABLET | Refills: 0 | Status: SHIPPED | OUTPATIENT
Start: 2024-02-02 | End: 2024-02-03

## 2024-02-22 ENCOUNTER — PATIENT MESSAGE (OUTPATIENT)
Dept: OBSTETRICS AND GYNECOLOGY | Facility: CLINIC | Age: 39
End: 2024-02-22
Payer: COMMERCIAL

## 2024-02-26 ENCOUNTER — PATIENT MESSAGE (OUTPATIENT)
Dept: SURGERY | Facility: CLINIC | Age: 39
End: 2024-02-26
Payer: COMMERCIAL

## 2024-02-26 ENCOUNTER — PATIENT MESSAGE (OUTPATIENT)
Dept: PAIN MEDICINE | Facility: CLINIC | Age: 39
End: 2024-02-26
Payer: COMMERCIAL

## 2024-02-26 NOTE — TELEPHONE ENCOUNTER
Called patient as well as her  to discuss her upcoming surgery.  All questions were answered.    KOKO Naylor MD, FACS, FASCRS  Staff Surgeon  Colon & Rectal Surgery

## 2024-02-27 ENCOUNTER — PATIENT MESSAGE (OUTPATIENT)
Dept: SURGERY | Facility: CLINIC | Age: 39
End: 2024-02-27
Payer: COMMERCIAL

## 2024-02-27 ENCOUNTER — TELEPHONE (OUTPATIENT)
Dept: SURGERY | Facility: CLINIC | Age: 39
End: 2024-02-27
Payer: COMMERCIAL

## 2024-02-28 ENCOUNTER — OFFICE VISIT (OUTPATIENT)
Dept: OBSTETRICS AND GYNECOLOGY | Facility: CLINIC | Age: 39
End: 2024-02-28
Payer: COMMERCIAL

## 2024-02-28 VITALS — WEIGHT: 198 LBS | BODY MASS INDEX: 32.99 KG/M2 | HEIGHT: 65 IN

## 2024-02-28 DIAGNOSIS — K59.01 SLOW TRANSIT CONSTIPATION: ICD-10-CM

## 2024-02-28 DIAGNOSIS — D25.1 INTRAMURAL LEIOMYOMA OF UTERUS: Primary | ICD-10-CM

## 2024-02-28 PROBLEM — M62.89 PELVIC FLOOR DYSFUNCTION: Status: RESOLVED | Noted: 2022-10-26 | Resolved: 2024-02-28

## 2024-02-28 PROCEDURE — 99999 PR PBB SHADOW E&M-EST. PATIENT-LVL III: CPT | Mod: PBBFAC,,, | Performed by: OBSTETRICS & GYNECOLOGY

## 2024-02-28 PROCEDURE — 99212 OFFICE O/P EST SF 10 MIN: CPT | Mod: S$GLB,,, | Performed by: OBSTETRICS & GYNECOLOGY

## 2024-02-28 PROCEDURE — 3008F BODY MASS INDEX DOCD: CPT | Mod: CPTII,S$GLB,, | Performed by: OBSTETRICS & GYNECOLOGY

## 2024-02-28 PROCEDURE — 1159F MED LIST DOCD IN RCRD: CPT | Mod: CPTII,S$GLB,, | Performed by: OBSTETRICS & GYNECOLOGY

## 2024-02-28 NOTE — PROGRESS NOTES
"  Subjective:       Patient ID: Priya Conrad is a 38 y.o. female.    Chief Complaint:  Consult      History of Present Illness  HPI  Referral to discuss finding of small myoma on ultrasound   History of constipation that has been retractable to all therapy   For colectomy in one week   As part of work up for pelvic and abdominal pain, ultrasound done with 13 cm uterus with 3 cm intramural myoma   Menses is regular and normal with no increase in pain   Recommend observation of myoma and proceed with planned CRS surgery     Health Maintenance   Topic Date Due    Hepatitis C Screening  Never done    Mammogram  2020    TETANUS VACCINE  2031    Lipid Panel  Completed     GYN & OB History  Patient's last menstrual period was 2024 (exact date).   Date of Last Pap: 2020    OB History    Para Term  AB Living   3 3 3     3   SAB IAB Ectopic Multiple Live Births                  # Outcome Date GA Lbr Tacos/2nd Weight Sex Delivery Anes PTL Lv   3 Term            2 Term            1 Term                Review of Systems  Review of Systems        Objective:   BP (P) 130/82   Ht 5' 5" (1.651 m)   Wt 89.8 kg (197 lb 15.6 oz)   LMP 2024 (Exact Date)   BMI 32.94 kg/m²    Physical Exam     Assessment:        1. Intramural leiomyoma of uterus    2. Slow transit constipation                Plan:            Priya was seen today for consult.    Diagnoses and all orders for this visit:    Intramural leiomyoma of uterus    Slow transit constipation        "

## 2024-02-29 NOTE — PRE-PROCEDURE INSTRUCTIONS
PREOP INSTRUCTIONS:  No food,milk or milk products for 8 hours before surgery.  Clear liquids like water,gatorade,apple juice are allowed up until 2 hours before surgery.  Instructed to follow the surgeon's instructions if they differ from these.  Shower instructions as well as directions to the Surgery Center were given.  Encouraged to wear loose fitting,comfortable clothing.  Medication instructions for pm prior to and am of procedure reviewed.  Instructed to avoid taking vitamins,supplements,aspirin and ibuprofen the morning of surgery.    Patient reported that with her last surgery in July 2023 she felt like it took her longer to wake up than it had for previous surgeries

## 2024-03-04 ENCOUNTER — ANESTHESIA EVENT (OUTPATIENT)
Dept: SURGERY | Facility: HOSPITAL | Age: 39
DRG: 331 | End: 2024-03-04
Payer: COMMERCIAL

## 2024-03-04 ENCOUNTER — HOSPITAL ENCOUNTER (INPATIENT)
Facility: HOSPITAL | Age: 39
LOS: 5 days | Discharge: HOME | DRG: 331 | End: 2024-03-09
Attending: COLON & RECTAL SURGERY | Admitting: COLON & RECTAL SURGERY
Payer: COMMERCIAL

## 2024-03-04 ENCOUNTER — ANESTHESIA (OUTPATIENT)
Dept: SURGERY | Facility: HOSPITAL | Age: 39
DRG: 331 | End: 2024-03-04
Payer: COMMERCIAL

## 2024-03-04 DIAGNOSIS — K59.9 COLONIC INERTIA: ICD-10-CM

## 2024-03-04 LAB
ABO + RH BLD: NORMAL
B-HCG UR QL: NEGATIVE
BLD GP AB SCN CELLS X3 SERPL QL: NORMAL
CTP QC/QA: YES
SPECIMEN OUTDATE: NORMAL

## 2024-03-04 PROCEDURE — D9220A PRA ANESTHESIA: Mod: ANES,,, | Performed by: STUDENT IN AN ORGANIZED HEALTH CARE EDUCATION/TRAINING PROGRAM

## 2024-03-04 PROCEDURE — 86901 BLOOD TYPING SEROLOGIC RH(D): CPT | Performed by: NURSE PRACTITIONER

## 2024-03-04 PROCEDURE — 25000003 PHARM REV CODE 250: Performed by: NURSE PRACTITIONER

## 2024-03-04 PROCEDURE — 36000711: Performed by: COLON & RECTAL SURGERY

## 2024-03-04 PROCEDURE — 63600175 PHARM REV CODE 636 W HCPCS: Performed by: NURSE PRACTITIONER

## 2024-03-04 PROCEDURE — 63600175 PHARM REV CODE 636 W HCPCS: Performed by: COLON & RECTAL SURGERY

## 2024-03-04 PROCEDURE — 27201423 OPTIME MED/SURG SUP & DEVICES STERILE SUPPLY: Performed by: COLON & RECTAL SURGERY

## 2024-03-04 PROCEDURE — 37000009 HC ANESTHESIA EA ADD 15 MINS: Performed by: COLON & RECTAL SURGERY

## 2024-03-04 PROCEDURE — 63600175 PHARM REV CODE 636 W HCPCS: Performed by: STUDENT IN AN ORGANIZED HEALTH CARE EDUCATION/TRAINING PROGRAM

## 2024-03-04 PROCEDURE — 36000710: Performed by: COLON & RECTAL SURGERY

## 2024-03-04 PROCEDURE — 25000003 PHARM REV CODE 250: Performed by: STUDENT IN AN ORGANIZED HEALTH CARE EDUCATION/TRAINING PROGRAM

## 2024-03-04 PROCEDURE — 0DTE4ZZ RESECTION OF LARGE INTESTINE, PERCUTANEOUS ENDOSCOPIC APPROACH: ICD-10-PCS | Performed by: COLON & RECTAL SURGERY

## 2024-03-04 PROCEDURE — 88307 TISSUE EXAM BY PATHOLOGIST: CPT | Performed by: PATHOLOGY

## 2024-03-04 PROCEDURE — 88307 TISSUE EXAM BY PATHOLOGIST: CPT | Mod: 26,,, | Performed by: PATHOLOGY

## 2024-03-04 PROCEDURE — 25000003 PHARM REV CODE 250: Performed by: NURSE ANESTHETIST, CERTIFIED REGISTERED

## 2024-03-04 PROCEDURE — 37000008 HC ANESTHESIA 1ST 15 MINUTES: Performed by: COLON & RECTAL SURGERY

## 2024-03-04 PROCEDURE — 36415 COLL VENOUS BLD VENIPUNCTURE: CPT | Performed by: COLON & RECTAL SURGERY

## 2024-03-04 PROCEDURE — 0DJD8ZZ INSPECTION OF LOWER INTESTINAL TRACT, VIA NATURAL OR ARTIFICIAL OPENING ENDOSCOPIC: ICD-10-PCS | Performed by: COLON & RECTAL SURGERY

## 2024-03-04 PROCEDURE — 63600175 PHARM REV CODE 636 W HCPCS: Performed by: NURSE ANESTHETIST, CERTIFIED REGISTERED

## 2024-03-04 PROCEDURE — 20600001 HC STEP DOWN PRIVATE ROOM

## 2024-03-04 PROCEDURE — 94799 UNLISTED PULMONARY SVC/PX: CPT

## 2024-03-04 PROCEDURE — 71000015 HC POSTOP RECOV 1ST HR: Performed by: COLON & RECTAL SURGERY

## 2024-03-04 PROCEDURE — 94761 N-INVAS EAR/PLS OXIMETRY MLT: CPT

## 2024-03-04 PROCEDURE — D9220A PRA ANESTHESIA: Mod: CRNA,,, | Performed by: NURSE ANESTHETIST, CERTIFIED REGISTERED

## 2024-03-04 PROCEDURE — 44210 LAPARO TOTAL PROCTOCOLECTOMY: CPT | Mod: ,,, | Performed by: COLON & RECTAL SURGERY

## 2024-03-04 PROCEDURE — 88305 TISSUE EXAM BY PATHOLOGIST: CPT | Mod: 26,,, | Performed by: PATHOLOGY

## 2024-03-04 PROCEDURE — 71000033 HC RECOVERY, INTIAL HOUR: Performed by: COLON & RECTAL SURGERY

## 2024-03-04 PROCEDURE — 88305 TISSUE EXAM BY PATHOLOGIST: CPT | Performed by: PATHOLOGY

## 2024-03-04 PROCEDURE — 71000016 HC POSTOP RECOV ADDL HR: Performed by: COLON & RECTAL SURGERY

## 2024-03-04 PROCEDURE — 99900035 HC TECH TIME PER 15 MIN (STAT)

## 2024-03-04 PROCEDURE — 81025 URINE PREGNANCY TEST: CPT | Performed by: COLON & RECTAL SURGERY

## 2024-03-04 RX ORDER — FENTANYL CITRATE 50 UG/ML
INJECTION, SOLUTION INTRAMUSCULAR; INTRAVENOUS
Status: DISCONTINUED | OUTPATIENT
Start: 2024-03-04 | End: 2024-03-04

## 2024-03-04 RX ORDER — TRAMADOL HYDROCHLORIDE 50 MG/1
50 TABLET ORAL EVERY 6 HOURS PRN
Status: DISCONTINUED | OUTPATIENT
Start: 2024-03-04 | End: 2024-03-09 | Stop reason: HOSPADM

## 2024-03-04 RX ORDER — IBUPROFEN 400 MG/1
800 TABLET ORAL EVERY 8 HOURS
Status: DISCONTINUED | OUTPATIENT
Start: 2024-03-05 | End: 2024-03-05

## 2024-03-04 RX ORDER — OXYCODONE HYDROCHLORIDE 10 MG/1
10 TABLET ORAL EVERY 4 HOURS PRN
Status: DISCONTINUED | OUTPATIENT
Start: 2024-03-04 | End: 2024-03-09 | Stop reason: HOSPADM

## 2024-03-04 RX ORDER — AMLODIPINE BESYLATE 5 MG/1
5 TABLET ORAL EVERY MORNING
Status: DISCONTINUED | OUTPATIENT
Start: 2024-03-05 | End: 2024-03-09 | Stop reason: HOSPADM

## 2024-03-04 RX ORDER — PHENYLEPHRINE HCL IN 0.9% NACL 1 MG/10 ML
SYRINGE (ML) INTRAVENOUS
Status: DISCONTINUED | OUTPATIENT
Start: 2024-03-04 | End: 2024-03-04

## 2024-03-04 RX ORDER — ONDANSETRON HYDROCHLORIDE 2 MG/ML
4 INJECTION, SOLUTION INTRAVENOUS DAILY PRN
Status: DISCONTINUED | OUTPATIENT
Start: 2024-03-04 | End: 2024-03-04 | Stop reason: HOSPADM

## 2024-03-04 RX ORDER — GABAPENTIN 300 MG/1
300 CAPSULE ORAL 3 TIMES DAILY
Status: DISCONTINUED | OUTPATIENT
Start: 2024-03-04 | End: 2024-03-04

## 2024-03-04 RX ORDER — FENTANYL CITRATE 50 UG/ML
25 INJECTION, SOLUTION INTRAMUSCULAR; INTRAVENOUS EVERY 5 MIN PRN
Status: COMPLETED | OUTPATIENT
Start: 2024-03-04 | End: 2024-03-04

## 2024-03-04 RX ORDER — PROCHLORPERAZINE EDISYLATE 5 MG/ML
5 INJECTION INTRAMUSCULAR; INTRAVENOUS EVERY 6 HOURS PRN
Status: DISCONTINUED | OUTPATIENT
Start: 2024-03-04 | End: 2024-03-09 | Stop reason: HOSPADM

## 2024-03-04 RX ORDER — MUPIROCIN 20 MG/G
1 OINTMENT TOPICAL
Status: COMPLETED | OUTPATIENT
Start: 2024-03-04 | End: 2024-03-04

## 2024-03-04 RX ORDER — ROCURONIUM BROMIDE 10 MG/ML
INJECTION, SOLUTION INTRAVENOUS
Status: DISCONTINUED | OUTPATIENT
Start: 2024-03-04 | End: 2024-03-04

## 2024-03-04 RX ORDER — LIDOCAINE HYDROCHLORIDE ANHYDROUS AND DEXTROSE MONOHYDRATE .8; 5 G/100ML; G/100ML
INJECTION, SOLUTION INTRAVENOUS CONTINUOUS PRN
Status: DISCONTINUED | OUTPATIENT
Start: 2024-03-04 | End: 2024-03-04

## 2024-03-04 RX ORDER — ALVIMOPAN 12 MG/1
12 CAPSULE ORAL
Status: COMPLETED | OUTPATIENT
Start: 2024-03-04 | End: 2024-03-04

## 2024-03-04 RX ORDER — ALVIMOPAN 12 MG/1
12 CAPSULE ORAL 2 TIMES DAILY
Status: DISCONTINUED | OUTPATIENT
Start: 2024-03-05 | End: 2024-03-05

## 2024-03-04 RX ORDER — GABAPENTIN 300 MG/1
300 CAPSULE ORAL 3 TIMES DAILY
Status: DISCONTINUED | OUTPATIENT
Start: 2024-03-04 | End: 2024-03-09 | Stop reason: HOSPADM

## 2024-03-04 RX ORDER — ACETAMINOPHEN 650 MG/20.3ML
975 LIQUID ORAL
Status: COMPLETED | OUTPATIENT
Start: 2024-03-04 | End: 2024-03-04

## 2024-03-04 RX ORDER — HEPARIN SODIUM 5000 [USP'U]/ML
5000 INJECTION, SOLUTION INTRAVENOUS; SUBCUTANEOUS EVERY 8 HOURS
Status: COMPLETED | OUTPATIENT
Start: 2024-03-04 | End: 2024-03-04

## 2024-03-04 RX ORDER — ONDANSETRON HYDROCHLORIDE 2 MG/ML
INJECTION, SOLUTION INTRAVENOUS
Status: DISCONTINUED | OUTPATIENT
Start: 2024-03-04 | End: 2024-03-04

## 2024-03-04 RX ORDER — TRIPROLIDINE/PSEUDOEPHEDRINE 2.5MG-60MG
600 TABLET ORAL
Status: COMPLETED | OUTPATIENT
Start: 2024-03-04 | End: 2024-03-04

## 2024-03-04 RX ORDER — PROPOFOL 10 MG/ML
VIAL (ML) INTRAVENOUS
Status: DISCONTINUED | OUTPATIENT
Start: 2024-03-04 | End: 2024-03-04

## 2024-03-04 RX ORDER — SODIUM CHLORIDE 0.9 % (FLUSH) 0.9 %
10 SYRINGE (ML) INJECTION
Status: DISCONTINUED | OUTPATIENT
Start: 2024-03-04 | End: 2024-03-09 | Stop reason: HOSPADM

## 2024-03-04 RX ORDER — MIDAZOLAM HYDROCHLORIDE 1 MG/ML
INJECTION, SOLUTION INTRAMUSCULAR; INTRAVENOUS
Status: DISCONTINUED | OUTPATIENT
Start: 2024-03-04 | End: 2024-03-04

## 2024-03-04 RX ORDER — METRONIDAZOLE 500 MG/100ML
500 INJECTION, SOLUTION INTRAVENOUS
Status: COMPLETED | OUTPATIENT
Start: 2024-03-04 | End: 2024-03-04

## 2024-03-04 RX ORDER — ACETAMINOPHEN 500 MG
1000 TABLET ORAL EVERY 8 HOURS
Status: DISCONTINUED | OUTPATIENT
Start: 2024-03-05 | End: 2024-03-05

## 2024-03-04 RX ORDER — LIDOCAINE HYDROCHLORIDE 10 MG/ML
1 INJECTION, SOLUTION EPIDURAL; INFILTRATION; INTRACAUDAL; PERINEURAL
Status: COMPLETED | OUTPATIENT
Start: 2024-03-04 | End: 2024-03-04

## 2024-03-04 RX ORDER — SODIUM CHLORIDE 9 MG/ML
INJECTION, SOLUTION INTRAVENOUS
Status: COMPLETED | OUTPATIENT
Start: 2024-03-04 | End: 2024-03-04

## 2024-03-04 RX ORDER — ONDANSETRON HYDROCHLORIDE 2 MG/ML
4 INJECTION, SOLUTION INTRAVENOUS EVERY 6 HOURS PRN
Status: DISCONTINUED | OUTPATIENT
Start: 2024-03-04 | End: 2024-03-09 | Stop reason: HOSPADM

## 2024-03-04 RX ORDER — DEXMEDETOMIDINE HYDROCHLORIDE 100 UG/ML
INJECTION, SOLUTION INTRAVENOUS
Status: DISCONTINUED | OUTPATIENT
Start: 2024-03-04 | End: 2024-03-04

## 2024-03-04 RX ORDER — TRIAMCINOLONE ACETONIDE 40 MG/ML
INJECTION, SUSPENSION INTRA-ARTICULAR; INTRAMUSCULAR
Status: DISCONTINUED | OUTPATIENT
Start: 2024-03-04 | End: 2024-03-04 | Stop reason: HOSPADM

## 2024-03-04 RX ORDER — MUPIROCIN 20 MG/G
OINTMENT TOPICAL 2 TIMES DAILY
Status: DISPENSED | OUTPATIENT
Start: 2024-03-04 | End: 2024-03-07

## 2024-03-04 RX ORDER — OXYCODONE HYDROCHLORIDE 5 MG/1
5 TABLET ORAL EVERY 6 HOURS PRN
Status: DISCONTINUED | OUTPATIENT
Start: 2024-03-04 | End: 2024-03-09 | Stop reason: HOSPADM

## 2024-03-04 RX ORDER — ALPRAZOLAM 0.5 MG/1
0.5 TABLET ORAL DAILY PRN
Status: DISCONTINUED | OUTPATIENT
Start: 2024-03-04 | End: 2024-03-09 | Stop reason: HOSPADM

## 2024-03-04 RX ORDER — ENOXAPARIN SODIUM 100 MG/ML
40 INJECTION SUBCUTANEOUS EVERY 24 HOURS
Status: DISCONTINUED | OUTPATIENT
Start: 2024-03-05 | End: 2024-03-09 | Stop reason: HOSPADM

## 2024-03-04 RX ORDER — GABAPENTIN 300 MG/1
300 CAPSULE ORAL
Status: COMPLETED | OUTPATIENT
Start: 2024-03-04 | End: 2024-03-04

## 2024-03-04 RX ORDER — OXYCODONE HYDROCHLORIDE 5 MG/1
5 TABLET ORAL
Status: DISCONTINUED | OUTPATIENT
Start: 2024-03-04 | End: 2024-03-04 | Stop reason: HOSPADM

## 2024-03-04 RX ORDER — KETAMINE HCL IN 0.9 % NACL 50 MG/5 ML
SYRINGE (ML) INTRAVENOUS
Status: DISCONTINUED | OUTPATIENT
Start: 2024-03-04 | End: 2024-03-04

## 2024-03-04 RX ORDER — DEXAMETHASONE SODIUM PHOSPHATE 4 MG/ML
INJECTION, SOLUTION INTRA-ARTICULAR; INTRALESIONAL; INTRAMUSCULAR; INTRAVENOUS; SOFT TISSUE
Status: DISCONTINUED | OUTPATIENT
Start: 2024-03-04 | End: 2024-03-04

## 2024-03-04 RX ORDER — LIDOCAINE HYDROCHLORIDE 20 MG/ML
INJECTION, SOLUTION EPIDURAL; INFILTRATION; INTRACAUDAL; PERINEURAL
Status: DISCONTINUED | OUTPATIENT
Start: 2024-03-04 | End: 2024-03-04

## 2024-03-04 RX ORDER — SODIUM CHLORIDE 9 MG/ML
INJECTION, SOLUTION INTRAVENOUS CONTINUOUS
Status: DISCONTINUED | OUTPATIENT
Start: 2024-03-04 | End: 2024-03-06

## 2024-03-04 RX ORDER — SODIUM CHLORIDE 0.9 % (FLUSH) 0.9 %
10 SYRINGE (ML) INJECTION
Status: DISCONTINUED | OUTPATIENT
Start: 2024-03-04 | End: 2024-03-04 | Stop reason: HOSPADM

## 2024-03-04 RX ORDER — ACETAMINOPHEN 10 MG/ML
1000 INJECTION, SOLUTION INTRAVENOUS EVERY 8 HOURS
Status: DISCONTINUED | OUTPATIENT
Start: 2024-03-04 | End: 2024-03-05

## 2024-03-04 RX ADMIN — DEXMEDETOMIDINE 12 MCG: 100 INJECTION, SOLUTION, CONCENTRATE INTRAVENOUS at 02:03

## 2024-03-04 RX ADMIN — OXYCODONE HYDROCHLORIDE 10 MG: 10 TABLET ORAL at 09:03

## 2024-03-04 RX ADMIN — Medication 200 MCG: at 03:03

## 2024-03-04 RX ADMIN — FENTANYL CITRATE 25 MCG: 50 INJECTION INTRAMUSCULAR; INTRAVENOUS at 06:03

## 2024-03-04 RX ADMIN — ROCURONIUM BROMIDE 20 MG: 10 SOLUTION INTRAVENOUS at 04:03

## 2024-03-04 RX ADMIN — ROCURONIUM BROMIDE 10 MG: 10 SOLUTION INTRAVENOUS at 05:03

## 2024-03-04 RX ADMIN — ONDANSETRON 4 MG: 2 INJECTION INTRAMUSCULAR; INTRAVENOUS at 08:03

## 2024-03-04 RX ADMIN — CEFTRIAXONE SODIUM 2 G: 2 INJECTION, POWDER, FOR SOLUTION INTRAMUSCULAR; INTRAVENOUS at 02:03

## 2024-03-04 RX ADMIN — SODIUM CHLORIDE: 0.9 INJECTION, SOLUTION INTRAVENOUS at 12:03

## 2024-03-04 RX ADMIN — LIDOCAINE HYDROCHLORIDE 100 MG: 20 INJECTION, SOLUTION EPIDURAL; INFILTRATION; INTRACAUDAL at 02:03

## 2024-03-04 RX ADMIN — ACETAMINOPHEN 1000 MG: 10 INJECTION, SOLUTION INTRAVENOUS at 09:03

## 2024-03-04 RX ADMIN — Medication 10 MG: at 04:03

## 2024-03-04 RX ADMIN — ROCURONIUM BROMIDE 10 MG: 10 SOLUTION INTRAVENOUS at 04:03

## 2024-03-04 RX ADMIN — MIDAZOLAM 2 MG: 1 INJECTION INTRAMUSCULAR; INTRAVENOUS at 02:03

## 2024-03-04 RX ADMIN — SODIUM CHLORIDE, SODIUM GLUCONATE, SODIUM ACETATE, POTASSIUM CHLORIDE, MAGNESIUM CHLORIDE, SODIUM PHOSPHATE, DIBASIC, AND POTASSIUM PHOSPHATE: .53; .5; .37; .037; .03; .012; .00082 INJECTION, SOLUTION INTRAVENOUS at 02:03

## 2024-03-04 RX ADMIN — SODIUM CHLORIDE: 9 INJECTION, SOLUTION INTRAVENOUS at 07:03

## 2024-03-04 RX ADMIN — ALVIMOPAN 12 MG: 12 CAPSULE ORAL at 01:03

## 2024-03-04 RX ADMIN — ACETAMINOPHEN 976.6 MG: 650 SOLUTION ORAL at 01:03

## 2024-03-04 RX ADMIN — DEXMEDETOMIDINE 4 MCG: 100 INJECTION, SOLUTION, CONCENTRATE INTRAVENOUS at 04:03

## 2024-03-04 RX ADMIN — MUPIROCIN 1 G: 20 OINTMENT TOPICAL at 01:03

## 2024-03-04 RX ADMIN — Medication 100 MCG: at 04:03

## 2024-03-04 RX ADMIN — HEPARIN SODIUM 5000 UNITS: 5000 INJECTION INTRAVENOUS; SUBCUTANEOUS at 01:03

## 2024-03-04 RX ADMIN — PROPOFOL 150 MG: 10 INJECTION, EMULSION INTRAVENOUS at 02:03

## 2024-03-04 RX ADMIN — MUPIROCIN: 20 OINTMENT TOPICAL at 08:03

## 2024-03-04 RX ADMIN — ROCURONIUM BROMIDE 10 MG: 10 SOLUTION INTRAVENOUS at 03:03

## 2024-03-04 RX ADMIN — METRONIDAZOLE 500 MG: 500 INJECTION, SOLUTION INTRAVENOUS at 02:03

## 2024-03-04 RX ADMIN — GABAPENTIN 300 MG: 300 CAPSULE ORAL at 01:03

## 2024-03-04 RX ADMIN — Medication 15 MG: at 03:03

## 2024-03-04 RX ADMIN — SODIUM CHLORIDE: 9 INJECTION, SOLUTION INTRAVENOUS at 12:03

## 2024-03-04 RX ADMIN — IBUPROFEN 600 MG: 100 SUSPENSION ORAL at 01:03

## 2024-03-04 RX ADMIN — OXYCODONE HYDROCHLORIDE 10 MG: 10 TABLET ORAL at 06:03

## 2024-03-04 RX ADMIN — LIDOCAINE HYDROCHLORIDE 1 MG: 10 INJECTION, SOLUTION EPIDURAL; INFILTRATION; INTRACAUDAL; PERINEURAL at 12:03

## 2024-03-04 RX ADMIN — FENTANYL CITRATE 100 MCG: 50 INJECTION INTRAMUSCULAR; INTRAVENOUS at 02:03

## 2024-03-04 RX ADMIN — ROCURONIUM BROMIDE 50 MG: 10 SOLUTION INTRAVENOUS at 02:03

## 2024-03-04 RX ADMIN — GABAPENTIN 300 MG: 300 CAPSULE ORAL at 08:03

## 2024-03-04 RX ADMIN — LIDOCAINE HYDROCHLORIDE ANHYDROUS AND DEXTROSE MONOHYDRATE 0.03 MG/KG/MIN: .8; 5 INJECTION, SOLUTION INTRAVENOUS at 02:03

## 2024-03-04 RX ADMIN — ONDANSETRON 4 MG: 2 INJECTION INTRAMUSCULAR; INTRAVENOUS at 05:03

## 2024-03-04 RX ADMIN — DEXAMETHASONE SODIUM PHOSPHATE 8 MG: 4 INJECTION INTRA-ARTICULAR; INTRALESIONAL; INTRAMUSCULAR; INTRAVENOUS; SOFT TISSUE at 02:03

## 2024-03-04 RX ADMIN — Medication 25 MG: at 02:03

## 2024-03-04 RX ADMIN — DEXMEDETOMIDINE 4 MCG: 100 INJECTION, SOLUTION, CONCENTRATE INTRAVENOUS at 03:03

## 2024-03-04 RX ADMIN — SUGAMMADEX 400 MG: 100 INJECTION, SOLUTION INTRAVENOUS at 05:03

## 2024-03-04 NOTE — H&P
CRS History and Physical    Referring Md:   No referring provider defined for this encounter.    SUBJECTIVE:     Chief Complaint: defecatory dysfunction    History of Present Illness:  Course is as follows:  Patient is a 38 y.o. female presents with defecatory dysfunction.     Seen by GI for evaluation for constipation  Taking linzess 290 every morning. Not really helping.  trulance was effective, then stopped working   Tried motegrity - got bad headaches. Not taking anymore  Failed amitiza  Tried pelvic physical therapy with no significant improvement.  Tried intermittent enemas with no significant improvement.  Restarted a regimen of Linzess 290 mcg every day.  This was ineffective.  She then restarted Trulance.  she will not have any bowel movements unless she takes a laxative.  With a laxative, she will have bowel movements once per week.  Without a laxative, she will have bowel movements once per month.  She complains of significant bloating, abdominal distention, pain, and inability to function normally secondary to her discomfort.  Thyroid function normal.    Past abdominal surgeries of prior  through a lower midline, abdominal plasty, and recent laparoscopic appendectomy.    No family hx of CRC in 1st degree.   Aunt - CRC    Workup:  Colonoscopy:  10/20/2021: Normal ileum and colon with redundant sigmoid colon  Defecogram 22:   - The anorectal angle measurements fall within range and there is expected increase in the angle on defecation.    - Small anterior rectocele.    - Small amount of residual after evacuation   CT abdomen pelvis 2023 reviewed demonstrates moderate amount of stool throughout the colon.  Small-bowel follow-through 2028: Normal small bowel follow through evaluation  Sitz Marker Study:    Day 1 (2023):  13 markers in the right colon.  Four markers of the hepatic flexure.  Two markers in the transverse colon.  Day 3 (2023):  5 markers at the splenic  flexure.  One marker in the rectum.  Day 5 (07/28/2023):  2 markers in the descending colon.  Four markers in the rectum.    Impression:  Consistent with slow transit constipation.     08/22/2023: Presents for follow-up.  Continuing to have significant issues with constipation.   - Manometry balloon was then inserted and filled to 60 mL of water.  She was then placed on the toilet and asked to expel the balloon.  She expelled the balloon in 2 minutes.  2/2/24:  Presents for evaluation for total abdominal colectomy.  Having to take increasing amounts of laxatives in order to have a bowel movement.  Normally takes 48-72 hours after laxatives to generate a bowel movement.  With laxatives, she is having bowel movements once per week..      Last Colonoscopy:  10/20/2021:  Impression:            - The examined portion of the ileum was normal.                          - The entire examined colon is normal on direct                          and retroflexion views. Sigmoid colon is redundant.                          - No specimens collected.     Interval history: Ms Conrad presents to preop for laparoscopic total abdominal colectomy. She has had not changes to her health since she was seen in clinic. Blood consent was obtained, surgical consent was obtained in clinic.    Review of Systems:  Review of Systems   Constitutional:  Negative for chills, diaphoresis, fever, malaise/fatigue and weight loss.   HENT:  Negative for congestion.    Respiratory:  Negative for shortness of breath.    Cardiovascular:  Negative for chest pain and leg swelling.   Gastrointestinal:  Positive for abdominal pain and constipation. Negative for blood in stool, nausea and vomiting.   Genitourinary:  Negative for dysuria.   Musculoskeletal:  Negative for back pain and myalgias.   Skin:  Negative for rash.   Neurological:  Negative for dizziness and weakness.   Endo/Heme/Allergies:  Does not bruise/bleed easily.   Psychiatric/Behavioral:  Negative  "for depression.        OBJECTIVE:     Vital Signs (Most Recent)  BP (!) 144/94 (BP Location: Left arm, Patient Position: Sitting, BP Method: Large (Automatic))   Pulse 70   Ht 5' 5" (1.651 m)   Wt 89.3 kg (196 lb 13.9 oz)   LMP 01/05/2024 (Exact Date)   BMI 32.76 kg/m²     Physical Exam:  General: Black or  female in no distress   Neuro: alert and oriented x 4.  Moves all extremities.     HEENT: no icterus.  Trachea midline  Respiratory: respirations are even and unlabored  Cardiac: regular rate  Abdomen:  Prior abdominal plasty incision at her belt line healing well.  Laparoscopic incisions from prior appendectomy also healing well.  Extremities: Warm dry and intact  Skin: no rashes  Anorectal:  External exam was normal.  Digital exam performed with normal resting tone.  No masses palpated.  Manometry balloon was then inserted and filled to 60 mL of water.  She was then placed on the toilet and asked to expel the balloon.  She expelled the balloon in 2 minutes.    Labs:  H&H 13 and 38.  Albumin 4.2.  Normal renal function.  Normal thyroid function.    Imaging:  CT abdomen pelvis 07/09/2023 reviewed demonstrates moderate amount of stool throughout the colon.  Acute appendicitis.      ASSESSMENT/PLAN:     Diagnoses and all orders for this visit:    Slow transit constipation  -     Case Request Operating Room: COLECTOMY, TOTAL, LAPAROSCOPIC,ERAS low, SIGMOIDOSCOPY, FLEXIBLE            38 y.o. female with significant constipation and extensive workup and she has failed medical management    - to OR for laparoscopic total abdominal colectomy, blood consent obtained in preop; surgical consent was obtained in clinic    Ruth Brenner MD  Pager: [phone]  General Surgery PGY-5  [facility]          "

## 2024-03-04 NOTE — ANESTHESIA PREPROCEDURE EVALUATION
2024  Priya Conrad is a 38 y.o., female.    Pre-operative evaluation for Procedure(s) (LRB):  COLECTOMY, TOTAL, LAPAROSCOPIC,ERAS low (N/A)  SIGMOIDOSCOPY, FLEXIBLE (N/A)    Patient Active Problem List   Diagnosis    Chronic migraine    Medication overuse headache    Muscle tightness    Cervical muscle pain    Bilateral shoulder pain    Poor posture    Essential hypertension    BMI 33.0-33.9,adult    GERD (gastroesophageal reflux disease)    Acute appendicitis    Slow transit constipation            Medications Prior to Admission   Medication Sig Dispense Refill Last Dose    ALPRAZolam (XANAX) 0.5 MG tablet Take 0.5 mg by mouth daily as needed.   Past Week    amLODIPine (NORVASC) 5 MG tablet Take 5 mg by mouth every morning.   3/4/2024 at 0500    tiZANidine 4 mg Cap Take 4 mg by mouth daily as needed.   Past Month    TRULANCE 3 mg Tab Take 1 tablet by mouth.   Past Week    FLUoxetine 20 MG capsule Take 20 mg by mouth once daily.       gabapentin (NEURONTIN) 300 MG capsule Take 600 mg by mouth every evening.   More than a month    lactulose (CHRONULAC) 20 gram/30 mL Soln Take 30 mLs (20 g total) by mouth 2 (two) times daily. 450 mL 0 More than a month    linaCLOtide (LINZESS) 290 mcg Cap capsule Take 1 capsule (290 mcg total) by mouth before breakfast. 30 capsule 3 More than a month    propranoloL (INDERAL LA) 120 MG 24 hr capsule    More than a month       Review of patient's allergies indicates:  No Known Allergies    Past Medical History:   Diagnosis Date    Anxiety     Heart murmur     Hypertension     Migraine      Past Surgical History:   Procedure Laterality Date     SECTION      COLONOSCOPY  10/20/2021    COLONOSCOPY N/A 10/20/2021    Procedure: COLONOSCOPY 2 day hanny;  Surgeon: Zheng Woodruff MD;  Location: [facility];  Service: Endoscopy;  Laterality: N/A;    EPIDURAL STEROID  "INJECTION INTO CERVICAL SPINE N/A 12/11/2023    Procedure: C7-T1 LIZANDRO;  Surgeon: Eric Jessica MD;  Location: [facility];  Service: Pain Management;  Laterality: N/A;  30 mins    EPIDURAL STEROID INJECTION INTO CERVICAL SPINE N/A 12/19/2023    Procedure: C7-T1 LIZANDRO;  Surgeon: Jose Metcalf DO;  Location: [facility];  Service: Pain Management;  Laterality: N/A;  15 mins    ESOPHAGOGASTRODUODENOSCOPY N/A 10/20/2021    Procedure: EGD (ESOPHAGOGASTRODUODENOSCOPY);  Surgeon: Zheng Woodruff MD;  Location: [facility] ENDO;  Service: Endoscopy;  Laterality: N/A;    LAPAROSCOPIC APPENDECTOMY N/A 7/10/2023    Procedure: APPENDECTOMY, LAPAROSCOPIC;  Surgeon: Ki De La Fuente MD;  Location: [facility] OR;  Service: General;  Laterality: N/A;    TUBAL LIGATION      Tummy Tuck      UPPER GASTROINTESTINAL ENDOSCOPY  10/20/2021     Tobacco Use    Smoking status: Never    Smokeless tobacco: Never   Substance and Sexual Activity    Alcohol use: Not Currently     Comment: socially    Drug use: No    Sexual activity: Not on file       Objective:     Vital Signs (Most Recent):  Temp: 36.8 °C (98.2 °F) (03/04/24 1332)  Pulse: 89 (03/04/24 1331)  Resp: 18 (03/04/24 1331)  BP: (!) 152/84 (03/04/24 1231)  SpO2: 100 % (03/04/24 1331) Vital Signs (24h Range):  Temp:  [36.8 °C (98.2 °F)] 36.8 °C (98.2 °F)  Pulse:  [89-99] 89  Resp:  [16-18] 18  SpO2:  [100 %] 100 %  BP: (152)/(84) 152/84     Weight: 86.6 kg (190 lb 14.7 oz)  Body mass index is 31.77 kg/m².        Significant Labs:  All pertinent labs from the last 24 hours have been reviewed.    CBC: No results for input(s): "WBC", "RBC", "HGB", "HCT", "PLT", "MCV", "MCH", "MCHC" in the last 72 hours.    CMP: No results for input(s): "NA", "K", "CL", "CO2", "BUN", "CREATININE", "GLU", "MG", "PHOS", "CALCIUM", "ALBUMIN", "PROT", "ALKPHOS", "ALT", "AST", "BILITOT" in the last 72 hours.    INR  No results for input(s): "PT", "INR", "PROTIME", "APTT" in the last 72 " hours.      Pre-op Assessment    I have reviewed the Patient Summary Reports.     I have reviewed the Nursing Notes. I have reviewed the NPO Status.   I have reviewed the Medications.     Review of Systems  Anesthesia Hx:             Denies Family Hx of Anesthesia complications.    Denies Personal Hx of Anesthesia complications.                    Cardiovascular:     Hypertension                                  Hypertension         Hepatic/GI:     GERD      Gerd          Neurological:      Headaches      Dx of Headaches                                  Anesthesia Plan  Type of Anesthesia, risks & benefits discussed:    Anesthesia Type: Gen ETT, Gen Supraglottic Airway, Gen Natural Airway, MAC  Intra-op Monitoring Plan: Standard ASA Monitors  Post Op Pain Control Plan: multimodal analgesia and IV/PO Opioids PRN  Induction:  IV  Informed Consent: Informed consent signed with the Patient and all parties understand the risks and agree with anesthesia plan.  All questions answered.   ASA Score: 3    Ready For Surgery From Anesthesia Perspective.     .

## 2024-03-04 NOTE — ANESTHESIA PROCEDURE NOTES
Intubation    Date/Time: 3/4/2024 2:40 PM    Performed by: Jim Mckinney CRNA  Authorized by: Ki Prado MD    Intubation:     Induction:  Intravenous    Intubated:  Postinduction    Mask Ventilation:  Easy mask    Attempts:  1    Attempted By:  CRNA    Method of Intubation:  Video laryngoscopy    Blade:  Myers 3    Laryngeal View Grade: Grade I - full view of cords      Difficult Airway Encountered?: No      Complications:  None    Airway Device:  Oral endotracheal tube    Airway Device Size:  7.0    Tube secured:  23    Secured at:  The lips    Placement Verified By:  Capnometry    Complicating Factors:  None    Findings Post-Intubation:  BS equal bilateral and atraumatic/condition of teeth unchanged

## 2024-03-04 NOTE — BRIEF OP NOTE
[facility] ([facility])  Brief Operative Note    SUMMARY     Surgery Date: 3/4/2024     Surgeon(s) and Role:     * KOKO Bejarano MD - Primary     * Ruth Brenner MD - Resident - Assisting    Pre-op Diagnosis:  Slow transit constipation [K59.01]    Post-op Diagnosis:  Post-Op Diagnosis Codes:     * Slow transit constipation [K59.01]    Procedure(s) (LRB):  COLECTOMY, TOTAL, LAPAROSCOPIC,ERAS low (N/A)  SIGMOIDOSCOPY, FLEXIBLE (N/A)    Anesthesia: General    Implants:  * No implants in log *    Operative Findings: Normal appearing colon. Laparoscopic total abdominal colectomy without complication    Estimated Blood Loss: 50mL    Estimated Blood Loss has been documented.         Specimens:   Specimen (24h ago, onward)       Start     Ordered    03/04/24 1738  Specimen to Pathology, Surgery Other (CRS)  Once        Comments: Pre-op Diagnosis: Slow transit constipation [K59.01]Procedure(s):COLECTOMY, TOTAL, LAPAROSCOPIC,ERAS lowSIGMOIDOSCOPY, FLEXIBLE Number of specimens: 2Name of specimens: 1. colon - perm 2. anastomotic rings - perm     References:    Click here for ordering Quick Tip   Question Answer Comment   Procedure Type: Other CRS   Which provider would you like to cc? KOKO BEJARANO    Release to patient Immediate        03/04/24 1738                    [id number]    Ruth Brenner MD  Pager: [phone]  General Surgery PGY-5  [facility]

## 2024-03-05 LAB
ANION GAP SERPL CALC-SCNC: 10 MMOL/L (ref 8–16)
BASOPHILS # BLD AUTO: 0.02 K/UL (ref 0–0.2)
BASOPHILS NFR BLD: 0.1 % (ref 0–1.9)
BUN SERPL-MCNC: 8 MG/DL (ref 6–20)
CALCIUM SERPL-MCNC: 8.9 MG/DL (ref 8.7–10.5)
CHLORIDE SERPL-SCNC: 104 MMOL/L (ref 95–110)
CO2 SERPL-SCNC: 25 MMOL/L (ref 23–29)
CREAT SERPL-MCNC: 0.9 MG/DL (ref 0.5–1.4)
DIFFERENTIAL METHOD BLD: ABNORMAL
EOSINOPHIL # BLD AUTO: 0 K/UL (ref 0–0.5)
EOSINOPHIL NFR BLD: 0 % (ref 0–8)
ERYTHROCYTE [DISTWIDTH] IN BLOOD BY AUTOMATED COUNT: 15.6 % (ref 11.5–14.5)
EST. GFR  (NO RACE VARIABLE): >60 ML/MIN/1.73 M^2
GLUCOSE SERPL-MCNC: 131 MG/DL (ref 70–110)
HCT VFR BLD AUTO: 39.4 % (ref 37–48.5)
HGB BLD-MCNC: 12.6 G/DL (ref 12–16)
IMM GRANULOCYTES # BLD AUTO: 0.05 K/UL (ref 0–0.04)
IMM GRANULOCYTES NFR BLD AUTO: 0.3 % (ref 0–0.5)
LYMPHOCYTES # BLD AUTO: 0.7 K/UL (ref 1–4.8)
LYMPHOCYTES NFR BLD: 4.8 % (ref 18–48)
MAGNESIUM SERPL-MCNC: 2 MG/DL (ref 1.6–2.6)
MCH RBC QN AUTO: 28.7 PG (ref 27–31)
MCHC RBC AUTO-ENTMCNC: 32 G/DL (ref 32–36)
MCV RBC AUTO: 90 FL (ref 82–98)
MONOCYTES # BLD AUTO: 0.7 K/UL (ref 0.3–1)
MONOCYTES NFR BLD: 4.9 % (ref 4–15)
NEUTROPHILS # BLD AUTO: 13.6 K/UL (ref 1.8–7.7)
NEUTROPHILS NFR BLD: 89.9 % (ref 38–73)
NRBC BLD-RTO: 0 /100 WBC
PHOSPHATE SERPL-MCNC: 2.4 MG/DL (ref 2.7–4.5)
PLATELET # BLD AUTO: 301 K/UL (ref 150–450)
PMV BLD AUTO: 11.1 FL (ref 9.2–12.9)
POTASSIUM SERPL-SCNC: 3.7 MMOL/L (ref 3.5–5.1)
RBC # BLD AUTO: 4.39 M/UL (ref 4–5.4)
SODIUM SERPL-SCNC: 139 MMOL/L (ref 136–145)
WBC # BLD AUTO: 15.17 K/UL (ref 3.9–12.7)

## 2024-03-05 PROCEDURE — 20600001 HC STEP DOWN PRIVATE ROOM

## 2024-03-05 PROCEDURE — 80048 BASIC METABOLIC PNL TOTAL CA: CPT | Performed by: STUDENT IN AN ORGANIZED HEALTH CARE EDUCATION/TRAINING PROGRAM

## 2024-03-05 PROCEDURE — 25000003 PHARM REV CODE 250: Performed by: STUDENT IN AN ORGANIZED HEALTH CARE EDUCATION/TRAINING PROGRAM

## 2024-03-05 PROCEDURE — 36415 COLL VENOUS BLD VENIPUNCTURE: CPT | Performed by: STUDENT IN AN ORGANIZED HEALTH CARE EDUCATION/TRAINING PROGRAM

## 2024-03-05 PROCEDURE — 83735 ASSAY OF MAGNESIUM: CPT | Performed by: STUDENT IN AN ORGANIZED HEALTH CARE EDUCATION/TRAINING PROGRAM

## 2024-03-05 PROCEDURE — 84100 ASSAY OF PHOSPHORUS: CPT | Performed by: STUDENT IN AN ORGANIZED HEALTH CARE EDUCATION/TRAINING PROGRAM

## 2024-03-05 PROCEDURE — 97165 OT EVAL LOW COMPLEX 30 MIN: CPT

## 2024-03-05 PROCEDURE — 25000003 PHARM REV CODE 250

## 2024-03-05 PROCEDURE — 63600175 PHARM REV CODE 636 W HCPCS: Performed by: STUDENT IN AN ORGANIZED HEALTH CARE EDUCATION/TRAINING PROGRAM

## 2024-03-05 PROCEDURE — 25000003 PHARM REV CODE 250: Performed by: NURSE PRACTITIONER

## 2024-03-05 PROCEDURE — 85025 COMPLETE CBC W/AUTO DIFF WBC: CPT | Performed by: STUDENT IN AN ORGANIZED HEALTH CARE EDUCATION/TRAINING PROGRAM

## 2024-03-05 RX ORDER — ACETAMINOPHEN 500 MG
1000 TABLET ORAL EVERY 8 HOURS
Status: DISCONTINUED | OUTPATIENT
Start: 2024-03-05 | End: 2024-03-09 | Stop reason: HOSPADM

## 2024-03-05 RX ORDER — SODIUM,POTASSIUM PHOSPHATES 280-250MG
2 POWDER IN PACKET (EA) ORAL ONCE
Status: COMPLETED | OUTPATIENT
Start: 2024-03-05 | End: 2024-03-05

## 2024-03-05 RX ORDER — IBUPROFEN 400 MG/1
800 TABLET ORAL EVERY 8 HOURS
Status: DISCONTINUED | OUTPATIENT
Start: 2024-03-05 | End: 2024-03-09 | Stop reason: HOSPADM

## 2024-03-05 RX ADMIN — ALPRAZOLAM 0.5 MG: 0.5 TABLET ORAL at 08:03

## 2024-03-05 RX ADMIN — MUPIROCIN: 20 OINTMENT TOPICAL at 08:03

## 2024-03-05 RX ADMIN — IBUPROFEN 800 MG: 800 INJECTION INTRAVENOUS at 12:03

## 2024-03-05 RX ADMIN — TRAMADOL HYDROCHLORIDE 50 MG: 50 TABLET, COATED ORAL at 05:03

## 2024-03-05 RX ADMIN — OXYCODONE 5 MG: 5 TABLET ORAL at 10:03

## 2024-03-05 RX ADMIN — IBUPROFEN 800 MG: 400 TABLET ORAL at 03:03

## 2024-03-05 RX ADMIN — GABAPENTIN 300 MG: 300 CAPSULE ORAL at 08:03

## 2024-03-05 RX ADMIN — OXYCODONE HYDROCHLORIDE 10 MG: 10 TABLET ORAL at 05:03

## 2024-03-05 RX ADMIN — ACETAMINOPHEN 1000 MG: 500 TABLET ORAL at 09:03

## 2024-03-05 RX ADMIN — GABAPENTIN 300 MG: 300 CAPSULE ORAL at 03:03

## 2024-03-05 RX ADMIN — AMLODIPINE BESYLATE 5 MG: 5 TABLET ORAL at 08:03

## 2024-03-05 RX ADMIN — POTASSIUM & SODIUM PHOSPHATES POWDER PACK 280-160-250 MG 2 PACKET: 280-160-250 PACK at 10:03

## 2024-03-05 RX ADMIN — ACETAMINOPHEN 1000 MG: 10 INJECTION, SOLUTION INTRAVENOUS at 05:03

## 2024-03-05 RX ADMIN — IBUPROFEN 800 MG: 400 TABLET ORAL at 09:03

## 2024-03-05 RX ADMIN — ACETAMINOPHEN 1000 MG: 500 TABLET ORAL at 03:03

## 2024-03-05 RX ADMIN — ENOXAPARIN SODIUM 40 MG: 40 INJECTION SUBCUTANEOUS at 05:03

## 2024-03-05 RX ADMIN — IBUPROFEN 800 MG: 800 INJECTION INTRAVENOUS at 06:03

## 2024-03-05 NOTE — TRANSFER OF CARE
"Anesthesia Transfer of Care Note    Patient: Priya Conrad    Procedure(s) Performed: Procedure(s) (LRB):  COLECTOMY, TOTAL, LAPAROSCOPIC,ERAS low (N/A)  SIGMOIDOSCOPY, FLEXIBLE (N/A)    Patient location: PACU    Anesthesia Type: general    Transport from OR: Transported from OR on 6-10 L/min O2 by face mask with adequate spontaneous ventilation    Post pain: adequate analgesia    Post assessment: no apparent anesthetic complications and tolerated procedure well    Post vital signs: stable    Level of consciousness: sedated    Nausea/Vomiting: no nausea/vomiting    Complications: none    Transfer of care protocol was followed      Last vitals: Visit Vitals  BP (!) 152/84 (BP Location: Left arm)   Pulse 90   Temp 36.8 °C (98.2 °F)   Resp 18   Ht 5' 5" (1.651 m)   Wt 86.6 kg (190 lb 14.7 oz)   LMP 03/01/2024 (Exact Date)   SpO2 100%   Breastfeeding No   BMI 31.77 kg/m²     "

## 2024-03-05 NOTE — PROGRESS NOTES
[facility]  Colorectal Surgery  Progress Note    Patient Name: Priya Conrad  MRN: [id number]  Admission Date: 3/4/2024  Hospital Length of Stay: 1 days  Attending Physician: KOKO Naylor MD    Subjective:     Interval History: No acute events overnight, HDS, AF. Pain is controlled with meds, little nausea when walking, no vomiting. Tolerated clears, ken removed this am. Incision CDI, no gas or BM yet.     Post-Op Info:  Procedure(s) (LRB):  COLECTOMY, TOTAL, LAPAROSCOPIC,ERAS low (N/A)  SIGMOIDOSCOPY, FLEXIBLE (N/A)   1 Day Post-Op      Medications:  Continuous Infusions:   sodium chloride 0.9% 40 mL/hr at 03/04/24 1901     Scheduled Meds:   acetaminophen  1,000 mg Intravenous Q8H    Followed by    acetaminophen  1,000 mg Oral Q8H    amLODIPine  5 mg Oral QAM    enoxparin  40 mg Subcutaneous Daily    gabapentin  300 mg Oral TID    ibuprofen  800 mg Intravenous Q8H    Followed by    ibuprofen  800 mg Oral Q8H    mupirocin   Nasal BID     PRN Meds:   ALPRAZolam    ondansetron    oxyCODONE    oxyCODONE    prochlorperazine    sodium chloride 0.9%    traMADoL        Objective:     Vital Signs (Most Recent):  Temp: 98.1 °F (36.7 °C) (03/05/24 0733)  Pulse: 77 (03/05/24 0733)  Resp: 19 (03/05/24 0733)  BP: (!) 113/55 (03/05/24 0733)  SpO2: 98 % (03/05/24 0733) Vital Signs (24h Range):  Temp:  [98.1 °F (36.7 °C)-98.6 °F (37 °C)] 98.1 °F (36.7 °C)  Pulse:  [66-99] 77  Resp:  [11-19] 19  SpO2:  [98 %-100 %] 98 %  BP: (113-152)/(55-84) 113/55     Intake/Output - Last 3 Shifts         03/03 0700  03/04 0659 03/04 0700  03/05 0659 03/05 0700  03/06 0659    IV Piggyback  950     Total Intake(mL/kg)  950 (11)     Urine (mL/kg/hr)  1190     Total Output  1190     Net  -240            Stool Occurrence  1 x              Physical Exam  Constitutional:       Appearance: Normal appearance.   HENT:      Head: Normocephalic and atraumatic.      Mouth/Throat:      Mouth: Mucous membranes are moist.   Cardiovascular:       "Rate and Rhythm: Regular rhythm.   Pulmonary:      Effort: Pulmonary effort is normal. No respiratory distress.   Abdominal:      General: Abdomen is flat. There is no distension.      Palpations: Abdomen is soft.      Tenderness: There is abdominal tenderness.      Comments: Incisions CDI   Skin:     General: Skin is warm.   Neurological:      General: No focal deficit present.      Mental Status: She is alert and oriented to person, place, and time. Mental status is at baseline.   Psychiatric:         Mood and Affect: Mood normal.         Behavior: Behavior normal.         Thought Content: Thought content normal.            Significant Labs:  BMP (Last 3 Results):   Recent Labs   Lab 03/05/24  0337   *      K 3.7      CO2 25   BUN 8   CREATININE 0.9   CALCIUM 8.9   MG 2.0     CBC (Last 3 Results):   Recent Labs   Lab 03/05/24 0337   WBC 15.17*   RBC 4.39   HGB 12.6   HCT 39.4      MCV 90   MCH 28.7   MCHC 32.0     CRP (Last 3 Results): No results for input(s): "CRP" in the last 168 hours.    Significant Diagnostics:  I have reviewed all pertinent imaging results/findings within the past 24 hours.  Assessment/Plan:     Slow transit constipation  39 yo F s/p laparoscopic total abdominal colectomy with stapled side-to-end ileorectal anastomosis 3/4 for slow-transit constipation.     - POD1   - Advance to low residue diet   - ken removed today, f/u post-void   - MMPC   - nausea meds prn   - daily labs   - replete electrolytes prn   - DVT ppx   - OOB, ambulate   - Pathway           Darryl Chavez MD  Colorectal Surgery  [facility]        "

## 2024-03-05 NOTE — OP NOTE
OSBALDO CONRAD  [id number]  [id number]    OPERATIVE NOTE:    DATE OF OPERATION: 03/04/2024    PREOPERATIVE DIAGNOSIS:  Slow transit constipation    POSTOPERATIVE DIAGNOSIS:  Slow transit constipation    PROCEDURE PERFORMED:   1. Laparoscopic total abdominal colectomy with stapled side-to-end ileorectal anastomosis  2. Flexible sigmoidoscopy.      ATTENDING SURGEON: KOKO Naylor MD    RESIDENT/FELLOW SURGEON: Ruth Brenner MD    ANESTHESIA: General    ESTIMATED BLOOD LOSS: 50mL    FINDINGS:  1. Negative anastomotic leak test.  Healthy appearing ileorectal anastomosis.  Mesentery was straight without twisting.    SPECIMENS:   1. Total abdominal colon  2. Anastomotic donuts    COMPLICATIONS:  None apparent    DRAINS:  None    DISPOSITION: PACU    CONDITION: good    INDICATION FOR PROCEDURE:  Osbaldo Conrad is a 38 y.o. female with slow transit constipation refractory to all medical therapy.  Extensive workup had been performed.  She presented for elective total abdominal colectomy.       DESCRIPTION OF PROCEDURE:    After informed consent was reviewed, the patient was taken to the Operating Room and placed under general anesthesia.  A Cool  catheter was sterilely inserted.  Preoperative antibiotics with ceftriaxone and Flagyl were given.  The patient was placed in lithotomy position. The arms were tucked and the anterior abdominal wall was prepped and draped in the usual sterile fashion.  A time out was perfomed.                 The abdomen was entered through a 8 cm Pfannenstiel incision 2 fingerbreadths above the pubic symphysis in her prior abdominal plasty incision.  The skin was incised sharply.  The fascia was incised with electrocautery.  Fascial flaps were elevated up to the umbilicus and down to the pubic symphysis.  The midline of the rectus muscles was then divided.  The abdomen was entered sharply without injury to the underlying bowel.  The sleeve of the GelPort was then placed. Three 5mm  trocars were inserted in the right lower quadrant, just above the umbilicus, and the left lower quadrant.   The top of the GelPort was then placed and the abdomen was insufflated.    The ligament Treitz was identified.  Next to the ligament of Treitz, the inferior mesenteric vein was identified.  The IMV was elevated and the peritoneum was incised.  The retroperitoneum was identified and swept posteriorly out to the abdominal sidewall and to the superior pole of the kidney.  Dissection continued inferiorly around the left colic artery.  The NURIA pedicle was then elevated.  The peritoneum was incised in the medial aspect.  The retrorectal space in the hypogastric nerves were identified and swept posteriorly.  The left ureter was identified and swept posteriorly.  Distal to the takeoff of the left colic artery, the superior rectal artery was isolated and divided with the ligature.  The left colic artery was then isolated and divided with the ligature.  The remainder of the retroperitoneum was swept down.  All lateral attachments were taken down.  The splenic flexure was completely mobilized from its attachments to the greater and lesser omentum, the spleen, the retroperitoneum, the pancreas, and the back wall the stomach.  The left branch of the middle colic artery was isolated and divided with the ligature.  Similarly, the right branch of the middle colic artery was also isolated and divided.  The remainder of the omentum was taken off of the transverse colon.  The patient was then placed onto her left side.  The ileocolic pedicle was elevated.  A window was made posterior to the ileocolic pedicle.  The duodenal was identified and protected.  The ileocolic pedicle was then ligated with the ligature.  A medial to lateral approach was also used here.  Lateral attachments were then taken down sharply.  The hepatic flexure was completely rotated medially and all attachments were divided.  With this, the colon had  excellent laxity.  The cap of the GelPort was then removed.  The specimen was brought out.  The small bowel overlying the midportion of colon.  Therefore, the colon was transected in the midportion of the transverse colon.  This allowed the small bowel to sit back in the abdomen.  Distally, the top of the rectum was identified by the splaying of the tenia coli, the absence of epiploica, and the passage of rectal sizers.  The top of the rectum was circumferentially dissected and divided with a contour stapler with a green load.  Proximally, the terminal ileum was isolated.  Distal to the intended site of transection, an enterotomy was made.  A 25 EEA anvil was inserted and brought out the anti mesenteric border 6 cm proximal to the intended site of transection.  A 3-0 PDS pursestring was placed around the anvil.  The ileum was then divided with a SANA stapler with a blue load.  The staple line was imbricated with 3-0 Vicryl Lembert sutures.    The EEA stapler was placed into the anal canal and advanced to the top of the rectal stump.  The spike was deployed just posterior to the staple line.  The anvil was connected and the stapler closed.  Care was taken to avoid entrapment of the bladder or ureters.  The stapler was fired and removed.  On the back table, two circumferential anastmotic donuts were found.  Leak testing was done with a colonoscope.  The anastomosis appeared healthy.  Leak test was negative. The anastomosis was then oversewn with 3-0 vicryl sutures.  The abdomen was reinsufflated.  Hemostasis was assured.  The small bowel mesentery was straight.              All members of the team then changed gowns and gloves and new instruments were used for a clean closure.  The Pfannenstiel incision was closed in layers.  The posterior peritoneum and fascia were closed with a #1 Running PDS.  Simple #1 PDS sutures were used to reapproximate the muscle.  The anterior rectus fascia was closed with #1 Running PDS  after hemostasis had been ensured.    All incisions were irrigated with saline and hemostasis was noted.  Skin incisions were closed with 4-0 Vicryl in subcuticular fashion and steri-strips were applied.                The patient tolerated the procedure well.  There were no apparent intraoperative complications.  All sponge, needle, and instrument counts were correct x2.  The patient was extubated and taken to PACU in stable condition.                KOKO Naylor MD, FACS, FASCRS  Staff Surgeon  Colon & Rectal Surgery

## 2024-03-05 NOTE — ASSESSMENT & PLAN NOTE
37 yo F s/p laparoscopic total abdominal colectomy with stapled side-to-end ileorectal anastomosis 3/4 for slow-transit constipation.     - POD1   - Advance to low residue diet   - ken removed today, f/u post-void   - MMPC   - nausea meds prn   - daily labs   - replete electrolytes prn   - DVT ppx   - OOB, ambulate   - Pathway

## 2024-03-05 NOTE — PLAN OF CARE
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]    Admission Date: 3/4/2024  Expected Discharge Date:     Transition of Care Barriers: None    Payor: [employer] [facility] / Plan: BCBS ALL OUT OF STATE / Product Type: PPO /     Extended Emergency Contact Information  Primary Emergency Contact: Carlos Acuna  Address: [address] [place] [address] [facility]  Home Phone: [phone]  Mobile Phone: [phone]  Relation: Spouse    Discharge Plan A: Home with family  Discharge Plan B: Home with family      [facility] - [place], [place] - [address]  [address]  [place] [address]  Phone: [phone] Fax: [phone]      Initial Assessment (most recent)       Adult Discharge Assessment - 03/05/24 1446          Discharge Assessment    Assessment Type Discharge Planning Assessment     Confirmed/corrected address, phone number and insurance Yes     Confirmed Demographics Correct on Facesheet     Source of Information patient     Communicated KIARRA with patient/caregiver Yes     People in Home spouse     Do you expect to return to your current living situation? Yes     Do you have help at home or someone to help you manage your care at home? No     Prior to hospitilization cognitive status: Alert/Oriented     Current cognitive status: Alert/Oriented     Home Layout Able to live on 1st floor     Do you take prescription medications? Yes     Do you have prescription coverage? Yes     Do you have any problems affording any of your prescribed medications? No     Is the patient taking medications as prescribed? yes     Who is going to help you get home at discharge?      How do you get to doctors appointments? family or friend will provide     Are you on dialysis? No     Do you take coumadin? No     Discharge Plan A Home with family      Discharge Plan B Home with family     Discharge Plan discussed with: Patient     Transition of Care Barriers None     SDOH --   no       Physical Activity    On average, how many days per week do you engage in moderate to strenuous exercise (like a brisk walk)? 0 days     On average, how many minutes do you engage in exercise at this level? 0 min        Financial Resource Strain    How hard is it for you to pay for the very basics like food, housing, medical care, and heating? Not hard at all        Housing Stability    In the last 12 months, was there a time when you were not able to pay the mortgage or rent on time? No     In the last 12 months, was there a time when you did not have a steady place to sleep or slept in a shelter (including now)? No        Transportation Needs    In the past 12 months, has lack of transportation kept you from medical appointments or from getting medications? No     In the past 12 months, has lack of transportation kept you from meetings, work, or from getting things needed for daily living? No        Food Insecurity    Within the past 12 months, you worried that your food would run out before you got the money to buy more. Never true     Within the past 12 months, the food you bought just didn't last and you didn't have money to get more. Never true        Stress    Do you feel stress - tense, restless, nervous, or anxious, or unable to sleep at night because your mind is troubled all the time - these days? Not at all        Social Connections    In a typical week, how many times do you talk on the phone with family, friends, or neighbors? More than three times a week     How often do you get together with friends or relatives? More than three times a week     How often do you attend Sikh or Oriental orthodox services? Never     Do you belong to any clubs or organizations such as Sikh groups, unions, fraternal or athletic groups, or school groups? No     How often do you attend  meetings of the clubs or organizations you belong to? Never     Are you , , , , never , or living with a partner?         Alcohol Use    Q1: How often do you have a drink containing alcohol? Never     Q2: How many drinks containing alcohol do you have on a typical day when you are drinking? Patient does not drink     Q3: How often do you have six or more drinks on one occasion? Never                      The CM met with the patient  at bedside to complete the DPA.  The CM placed name and contact information on the blackboard in the patient's room.  Use preferred pharmacy / bedside delivery for any necessary  medications at the time of discharge.The patient is independent with all ADLs. The patient is not on Dialysis or Coumadin. The patient's  will provide assistance to the patient upon discharge. The patient's  will provide transportation upon discharge .  The CM will continue to follow for course of hospitalization.

## 2024-03-05 NOTE — ANESTHESIA POSTPROCEDURE EVALUATION
Anesthesia Post Evaluation    Patient: Priya Conrad    Procedure(s) Performed: Procedure(s) (LRB):  COLECTOMY, TOTAL, LAPAROSCOPIC,ERAS low (N/A)  SIGMOIDOSCOPY, FLEXIBLE (N/A)    Final Anesthesia Type: general      Patient location during evaluation: PACU  Patient participation: No - Unable to Participate, Sedation  Level of consciousness: sedated  Post-procedure vital signs: reviewed and stable  Pain management: adequate  Airway patency: patent    PONV status at discharge: No PONV  Anesthetic complications: no      Cardiovascular status: hemodynamically stable  Respiratory status: unassisted and spontaneous ventilation  Hydration status: euvolemic  Follow-up not needed.          Vitals Value Taken Time   /70 03/04/24 1804   Pulse 85 03/04/24 1806   Resp 13 03/04/24 1806   SpO2 100 % 03/04/24 1806   Vitals shown include unvalidated device data.      No case tracking events are documented in the log.      Pain/Melany Score: Pain Rating Prior to Med Admin: 0 (3/4/2024  1:32 PM)

## 2024-03-05 NOTE — PT/OT/SLP EVAL
Occupational Therapy   Evaluation and Discharge Note    Name: Priya Conrad  MRN: [id number]  Admitting Diagnosis: Slow transit constipation  Recent Surgery: Procedure(s) (LRB):  COLECTOMY, TOTAL, LAPAROSCOPIC,ERAS low (N/A)  SIGMOIDOSCOPY, FLEXIBLE (N/A) 1 Day Post-Op    Recommendations:     Discharge Recommendations: No Therapy Indicated  Discharge Equipment Recommendations: none  Barriers to discharge:       Assessment:     Priya Conrad is a 38 y.o. female with a medical diagnosis of Slow transit constipation. Pt is s/p colectomy sx on 3/4. At this time, patient is functioning at their prior level of function and does not require further acute OT services.     Plan:     During this hospitalization, patient does not require further acute OT services.  Please re-consult if situation changes.    Plan of Care Reviewed with: patient    Subjective     Occupational Profile:  Living Environment: Pt lives with her  in a H with no steps to enter.  Previous level of function: Independent  Equipment Used at home: none  Assistance upon Discharge:     Pain/Comfort:  Pain Rating 1: 0/10    Patients cultural, spiritual, Jainism conflicts given the current situation:      Objective:     Communicated with: rn prior to session.  Patient found supine with peripheral IV upon OT entry to room.    General Precautions: Standard,    Orthopedic Precautions:    Braces:    Respiratory Status: Room air     Occupational Performance:    Bed Mobility:    Patient completed Scooting/Bridging with independence  Patient completed Supine to Sit with independence  Patient completed Sit to Supine with independence    Functional Mobility/Transfers:  Patient completed Sit <> Stand Transfer with independence  with  no assistive device   Functional Mobility: Independent    Activities of Daily Living:  Independent    Cognitive/Visual Perceptual:  Cognitive/Psychosocial Skills:     -       Oriented to: Person, Place, Time, and  Situation   -       Safety awareness/insight to disability: intact     Physical Exam:  BUE AROM/MMT: WNL    AMPAC 6 Click ADL:  AMPAC Total Score: 23    Treatment & Education:  UE ROM/MMT  Bed mobility training / assessment  Functional mobility assessment  Sit/standing balance assessment  Educated on importance of sitting OOB in bedside chair to promote increased strength, endurance & breathing.  Discussed D/C of OT    Patient left supine with all lines intact and call button in reach    GOALS:   Multidisciplinary Problems       Occupational Therapy Goals       Not on file                    History:     Past Medical History:   Diagnosis Date    Anxiety     Heart murmur     Hypertension     Migraine          Past Surgical History:   Procedure Laterality Date     SECTION      COLONOSCOPY  10/20/2021    COLONOSCOPY N/A 10/20/2021    Procedure: COLONOSCOPY 2 day golytely;  Surgeon: Zheng Woodruff MD;  Location: [facility];  Service: Endoscopy;  Laterality: N/A;    EPIDURAL STEROID INJECTION INTO CERVICAL SPINE N/A 2023    Procedure: C7-T1 LIZANDRO;  Surgeon: Eric Jessica MD;  Location: [facility];  Service: Pain Management;  Laterality: N/A;  30 mins    EPIDURAL STEROID INJECTION INTO CERVICAL SPINE N/A 2023    Procedure: C7-T1 LIZANDRO;  Surgeon: Jose Metcalf DO;  Location: [facility];  Service: Pain Management;  Laterality: N/A;  15 mins    ESOPHAGOGASTRODUODENOSCOPY N/A 10/20/2021    Procedure: EGD (ESOPHAGOGASTRODUODENOSCOPY);  Surgeon: Zheng Woodruff MD;  Location: [facility];  Service: Endoscopy;  Laterality: N/A;    FLEXIBLE SIGMOIDOSCOPY N/A 3/4/2024    Procedure: SIGMOIDOSCOPY, FLEXIBLE;  Surgeon: KOKO Naylor MD;  Location: [address];  Service: Colon and Rectal;  Laterality: N/A;    LAPAROSCOPIC APPENDECTOMY N/A 7/10/2023    Procedure: APPENDECTOMY, LAPAROSCOPIC;  Surgeon: Ki De La Fuente MD;  Location: [facility];  Service: General;  Laterality:  N/A;    LAPAROSCOPIC TOTAL COLECTOMY N/A 3/4/2024    Procedure: COLECTOMY, TOTAL, LAPAROSCOPIC,ERAS low;  Surgeon: KOKO Naylor MD;  Location: [facility] OR [address] [place] [address];  Service: Colon and Rectal;  Laterality: N/A;    TUBAL LIGATION      [facility]      UPPER GASTROINTESTINAL ENDOSCOPY  10/20/2021       Time Tracking:     OT Date of Treatment: 03/05/24  OT Start Time: 1043  OT Stop Time: 1052  OT Total Time (min): 9 min    Billable Minutes:Evaluation 9    3/5/2024

## 2024-03-05 NOTE — NURSING TRANSFER
Nursing Transfer Note      3/4/2024   7:50 PM    Pt transported to 1003 on bed by pct, transport team.  Transported c iv pump and ivf infusing.  VSS, pain 2/10, denies sob, n/v.  Report given to day shift RN Shae, who confirmed that she would report to night shift rn.  Family updated via text service

## 2024-03-05 NOTE — SUBJECTIVE & OBJECTIVE
Subjective:     Interval History: No acute events overnight, HDS, AF. Pain is controlled with meds, little nausea when walking, no vomiting. Tolerated clears, ken removed this am. Incision CDI, no gas or BM yet.     Post-Op Info:  Procedure(s) (LRB):  COLECTOMY, TOTAL, LAPAROSCOPIC,ERAS low (N/A)  SIGMOIDOSCOPY, FLEXIBLE (N/A)   1 Day Post-Op      Medications:  Continuous Infusions:   sodium chloride 0.9% 40 mL/hr at 03/04/24 1901     Scheduled Meds:   acetaminophen  1,000 mg Intravenous Q8H    Followed by    acetaminophen  1,000 mg Oral Q8H    amLODIPine  5 mg Oral QAM    enoxparin  40 mg Subcutaneous Daily    gabapentin  300 mg Oral TID    ibuprofen  800 mg Intravenous Q8H    Followed by    ibuprofen  800 mg Oral Q8H    mupirocin   Nasal BID     PRN Meds:   ALPRAZolam    ondansetron    oxyCODONE    oxyCODONE    prochlorperazine    sodium chloride 0.9%    traMADoL        Objective:     Vital Signs (Most Recent):  Temp: 98.1 °F (36.7 °C) (03/05/24 0733)  Pulse: 77 (03/05/24 0733)  Resp: 19 (03/05/24 0733)  BP: (!) 113/55 (03/05/24 0733)  SpO2: 98 % (03/05/24 0733) Vital Signs (24h Range):  Temp:  [98.1 °F (36.7 °C)-98.6 °F (37 °C)] 98.1 °F (36.7 °C)  Pulse:  [66-99] 77  Resp:  [11-19] 19  SpO2:  [98 %-100 %] 98 %  BP: (113-152)/(55-84) 113/55     Intake/Output - Last 3 Shifts         03/03 0700 03/04 0659 03/04 0700 03/05 0659 03/05 0700 03/06 0659    IV Piggyback  950     Total Intake(mL/kg)  950 (11)     Urine (mL/kg/hr)  1190     Total Output  1190     Net  -240            Stool Occurrence  1 x              Physical Exam  Constitutional:       Appearance: Normal appearance.   HENT:      Head: Normocephalic and atraumatic.      Mouth/Throat:      Mouth: Mucous membranes are moist.   Cardiovascular:      Rate and Rhythm: Regular rhythm.   Pulmonary:      Effort: Pulmonary effort is normal. No respiratory distress.   Abdominal:      General: Abdomen is flat. There is no distension.      Palpations: Abdomen is  "soft.      Tenderness: There is abdominal tenderness.      Comments: Incisions CDI   Skin:     General: Skin is warm.   Neurological:      General: No focal deficit present.      Mental Status: She is alert and oriented to person, place, and time. Mental status is at baseline.   Psychiatric:         Mood and Affect: Mood normal.         Behavior: Behavior normal.         Thought Content: Thought content normal.            Significant Labs:  BMP (Last 3 Results):   Recent Labs   Lab 03/05/24  0337   *      K 3.7      CO2 25   BUN 8   CREATININE 0.9   CALCIUM 8.9   MG 2.0     CBC (Last 3 Results):   Recent Labs   Lab 03/05/24  0337   WBC 15.17*   RBC 4.39   HGB 12.6   HCT 39.4      MCV 90   MCH 28.7   MCHC 32.0     CRP (Last 3 Results): No results for input(s): "CRP" in the last 168 hours.    Significant Diagnostics:  I have reviewed all pertinent imaging results/findings within the past 24 hours.  "

## 2024-03-05 NOTE — CARE UPDATE
I have reviewed the chart of Priya PARKER [place] who is hospitalized for the following:    Active Hospital Problems    Diagnosis    *Slow transit constipation    Essential hypertension     Resumed anti hypertensive meds      BMI 33.0-33.9,adult          Sarina Cheema PA-C  Unit Based [facility]

## 2024-03-06 PROCEDURE — 25000003 PHARM REV CODE 250: Performed by: STUDENT IN AN ORGANIZED HEALTH CARE EDUCATION/TRAINING PROGRAM

## 2024-03-06 PROCEDURE — 25000003 PHARM REV CODE 250: Performed by: NURSE PRACTITIONER

## 2024-03-06 PROCEDURE — 20600001 HC STEP DOWN PRIVATE ROOM

## 2024-03-06 RX ADMIN — GABAPENTIN 300 MG: 300 CAPSULE ORAL at 08:03

## 2024-03-06 RX ADMIN — IBUPROFEN 800 MG: 400 TABLET ORAL at 01:03

## 2024-03-06 RX ADMIN — ACETAMINOPHEN 1000 MG: 500 TABLET ORAL at 05:03

## 2024-03-06 RX ADMIN — ALPRAZOLAM 0.5 MG: 0.5 TABLET ORAL at 08:03

## 2024-03-06 RX ADMIN — IBUPROFEN 800 MG: 400 TABLET ORAL at 05:03

## 2024-03-06 RX ADMIN — ACETAMINOPHEN 1000 MG: 500 TABLET ORAL at 01:03

## 2024-03-06 RX ADMIN — AMLODIPINE BESYLATE 5 MG: 5 TABLET ORAL at 08:03

## 2024-03-06 RX ADMIN — GABAPENTIN 300 MG: 300 CAPSULE ORAL at 01:03

## 2024-03-06 RX ADMIN — TRAMADOL HYDROCHLORIDE 50 MG: 50 TABLET, COATED ORAL at 08:03

## 2024-03-06 NOTE — PLAN OF CARE
Christian Munoz [facility]  Discharge Reassessment    Primary Care Provider: Vinny Rose MD    Expected Discharge Date: 3/12/2024    Reassessment (most recent)       Discharge Reassessment - 03/06/24 1341          Discharge Reassessment    Assessment Type Discharge Planning Reassessment     Did the patient's condition or plan change since previous assessment? No     Discharge Plan discussed with: Patient     Communicated KIARRA with patient/caregiver Yes     Discharge Plan A Home with family     Discharge Plan B Home with family     Transition of Care Barriers None     Why the patient remains in the hospital Requires continued medical care                   Plan is home tomorrow with no needs.

## 2024-03-06 NOTE — PROGRESS NOTES
[facility]  Colorectal Surgery  Progress Note    Patient Name: Priya Conrad  MRN: [id number]  Admission Date: 3/4/2024  Hospital Length of Stay: 2 days  Attending Physician: KOKO Naylor MD    Subjective:     Interval History: No acute events overnight, HDS, AF. Slept good, having no pain, no N/V, incisions CDI, having BM, tolerating diet.     Post-Op Info:  Procedure(s) (LRB):  COLECTOMY, TOTAL, LAPAROSCOPIC,ERAS low (N/A)  SIGMOIDOSCOPY, FLEXIBLE (N/A)   2 Days Post-Op      Medications:  Continuous Infusions:  Scheduled Meds:   acetaminophen  1,000 mg Oral Q8H    amLODIPine  5 mg Oral QAM    enoxparin  40 mg Subcutaneous Daily    gabapentin  300 mg Oral TID    ibuprofen  800 mg Oral Q8H    mupirocin   Nasal BID     PRN Meds:   ALPRAZolam    ondansetron    oxyCODONE    oxyCODONE    prochlorperazine    sodium chloride 0.9%    traMADoL        Objective:     Vital Signs (Most Recent):  Temp: 98.6 °F (37 °C) (03/06/24 0729)  Pulse: 63 (03/06/24 0729)  Resp: 18 (03/06/24 0729)  BP: 117/70 (03/06/24 0729)  SpO2: 97 % (03/06/24 0729) Vital Signs (24h Range):  Temp:  [98.3 °F (36.8 °C)-99.1 °F (37.3 °C)] 98.6 °F (37 °C)  Pulse:  [] 63  Resp:  [18-19] 18  SpO2:  [97 %-100 %] 97 %  BP: (114-140)/(62-85) 117/70     Intake/Output - Last 3 Shifts         03/04 0700 03/05 0659 03/05 0700 03/06 0659 03/06 0700 03/07 0659    P.O.  600     IV Piggyback 950      Total Intake(mL/kg) 950 (11) 600 (6.9)     Urine (mL/kg/hr) 1190      Total Output 1190      Net -240 +600            Urine Occurrence  5 x     Stool Occurrence 1 x 0 x              Physical Exam  Constitutional:       Appearance: Normal appearance.   HENT:      Head: Normocephalic and atraumatic.      Mouth/Throat:      Mouth: Mucous membranes are moist.   Cardiovascular:      Rate and Rhythm: Regular rhythm.   Pulmonary:      Effort: Pulmonary effort is normal. No respiratory distress.   Abdominal:      General: Abdomen is flat. There is no  distension.      Palpations: Abdomen is soft.      Tenderness: There is abdominal tenderness.      Comments: Incisions CDI   Skin:     General: Skin is warm.   Neurological:      General: No focal deficit present.      Mental Status: She is alert and oriented to person, place, and time. Mental status is at baseline.   Psychiatric:         Mood and Affect: Mood normal.         Behavior: Behavior normal.         Thought Content: Thought content normal.            Significant Labs:  BMP (Last 3 Results):   Recent Labs   Lab 03/05/24  0337   *      K 3.7      CO2 25   BUN 8   CREATININE 0.9   CALCIUM 8.9   MG 2.0     CMP (Last 3 Results):   Recent Labs   Lab 03/05/24  0337   *   CALCIUM 8.9      K 3.7   CO2 25      BUN 8   CREATININE 0.9       Significant Diagnostics:  I have reviewed all pertinent imaging results/findings within the past 24 hours.  Assessment/Plan:     * Slow transit constipation  39 yo F s/p laparoscopic total abdominal colectomy with stapled side-to-end ileorectal anastomosis 3/4 for slow-transit constipation.     - POD2  - low residue diet   - MMPC   - nausea meds prn   - CRP on POD3  - DVT ppx   - OOB, ambulate   - Pathway     Dispo: [facility], potentially home tomorrow           Darryl Chavez MD  Colorectal Surgery  [facility]

## 2024-03-06 NOTE — SUBJECTIVE & OBJECTIVE
Subjective:     Interval History: No acute events overnight, HDS, AF. Slept good, having no pain, no N/V, incisions CDI, having BM, tolerating diet.     Post-Op Info:  Procedure(s) (LRB):  COLECTOMY, TOTAL, LAPAROSCOPIC,ERAS low (N/A)  SIGMOIDOSCOPY, FLEXIBLE (N/A)   2 Days Post-Op      Medications:  Continuous Infusions:  Scheduled Meds:   acetaminophen  1,000 mg Oral Q8H    amLODIPine  5 mg Oral QAM    enoxparin  40 mg Subcutaneous Daily    gabapentin  300 mg Oral TID    ibuprofen  800 mg Oral Q8H    mupirocin   Nasal BID     PRN Meds:   ALPRAZolam    ondansetron    oxyCODONE    oxyCODONE    prochlorperazine    sodium chloride 0.9%    traMADoL        Objective:     Vital Signs (Most Recent):  Temp: 98.6 °F (37 °C) (03/06/24 0729)  Pulse: 63 (03/06/24 0729)  Resp: 18 (03/06/24 0729)  BP: 117/70 (03/06/24 0729)  SpO2: 97 % (03/06/24 0729) Vital Signs (24h Range):  Temp:  [98.3 °F (36.8 °C)-99.1 °F (37.3 °C)] 98.6 °F (37 °C)  Pulse:  [] 63  Resp:  [18-19] 18  SpO2:  [97 %-100 %] 97 %  BP: (114-140)/(62-85) 117/70     Intake/Output - Last 3 Shifts         03/04 0700  03/05 0659 03/05 0700 03/06 0659 03/06 0700  03/07 0659    P.O.  600     IV Piggyback 950      Total Intake(mL/kg) 950 (11) 600 (6.9)     Urine (mL/kg/hr) 1190      Total Output 1190      Net -240 +600            Urine Occurrence  5 x     Stool Occurrence 1 x 0 x              Physical Exam  Constitutional:       Appearance: Normal appearance.   HENT:      Head: Normocephalic and atraumatic.      Mouth/Throat:      Mouth: Mucous membranes are moist.   Cardiovascular:      Rate and Rhythm: Regular rhythm.   Pulmonary:      Effort: Pulmonary effort is normal. No respiratory distress.   Abdominal:      General: Abdomen is flat. There is no distension.      Palpations: Abdomen is soft.      Tenderness: There is abdominal tenderness.      Comments: Incisions CDI   Skin:     General: Skin is warm.   Neurological:      General: No focal deficit present.       Mental Status: She is alert and oriented to person, place, and time. Mental status is at baseline.   Psychiatric:         Mood and Affect: Mood normal.         Behavior: Behavior normal.         Thought Content: Thought content normal.            Significant Labs:  BMP (Last 3 Results):   Recent Labs   Lab 03/05/24  0337   *      K 3.7      CO2 25   BUN 8   CREATININE 0.9   CALCIUM 8.9   MG 2.0     CMP (Last 3 Results):   Recent Labs   Lab 03/05/24  0337   *   CALCIUM 8.9      K 3.7   CO2 25      BUN 8   CREATININE 0.9       Significant Diagnostics:  I have reviewed all pertinent imaging results/findings within the past 24 hours.

## 2024-03-06 NOTE — ASSESSMENT & PLAN NOTE
37 yo F s/p laparoscopic total abdominal colectomy with stapled side-to-end ileorectal anastomosis 3/4 for slow-transit constipation.     - POD2  - low residue diet   - MMPC   - nausea meds prn   - CRP on POD3  - DVT ppx   - OOB, ambulate   - Pathway     Dispo: [facility], potentially home tomorrow

## 2024-03-06 NOTE — PLAN OF CARE
..[facility] Plan of Care Note  Dx slow transit constipation    Shift Events none    Goals of Care: dc    Neuro: intact    Vital Signs: normal    Respiratory: wdl    Diet: LFLR    Is patient tolerating current diet? yes    GTTS: none    Urine Output/Bowel Movement: adequate urine, lbm 3/6    Drains/Tubes/Tube Feeds (include total output/shift): none    Lines: piv      Accuchecks:none    Skin: lap sites, transverse lower abd incision, steri strips    Fall Risk Score: 6    Activity level? IND    Any scheduled procedures? none    Any safety concerns? no    Other:

## 2024-03-07 LAB — CRP SERPL-MCNC: 31.3 MG/L (ref 0–8.2)

## 2024-03-07 PROCEDURE — 25000003 PHARM REV CODE 250: Performed by: NURSE PRACTITIONER

## 2024-03-07 PROCEDURE — 63600175 PHARM REV CODE 636 W HCPCS: Performed by: STUDENT IN AN ORGANIZED HEALTH CARE EDUCATION/TRAINING PROGRAM

## 2024-03-07 PROCEDURE — 36415 COLL VENOUS BLD VENIPUNCTURE: CPT | Performed by: STUDENT IN AN ORGANIZED HEALTH CARE EDUCATION/TRAINING PROGRAM

## 2024-03-07 PROCEDURE — 25000003 PHARM REV CODE 250: Performed by: STUDENT IN AN ORGANIZED HEALTH CARE EDUCATION/TRAINING PROGRAM

## 2024-03-07 PROCEDURE — 86140 C-REACTIVE PROTEIN: CPT | Performed by: STUDENT IN AN ORGANIZED HEALTH CARE EDUCATION/TRAINING PROGRAM

## 2024-03-07 PROCEDURE — 63600175 PHARM REV CODE 636 W HCPCS

## 2024-03-07 PROCEDURE — 20600001 HC STEP DOWN PRIVATE ROOM

## 2024-03-07 RX ORDER — HYDROMORPHONE HYDROCHLORIDE 1 MG/ML
1 INJECTION, SOLUTION INTRAMUSCULAR; INTRAVENOUS; SUBCUTANEOUS
Status: COMPLETED | OUTPATIENT
Start: 2024-03-07 | End: 2024-03-07

## 2024-03-07 RX ORDER — SODIUM CHLORIDE, SODIUM LACTATE, POTASSIUM CHLORIDE, CALCIUM CHLORIDE 600; 310; 30; 20 MG/100ML; MG/100ML; MG/100ML; MG/100ML
INJECTION, SOLUTION INTRAVENOUS CONTINUOUS
Status: DISCONTINUED | OUTPATIENT
Start: 2024-03-07 | End: 2024-03-07

## 2024-03-07 RX ADMIN — SODIUM CHLORIDE, POTASSIUM CHLORIDE, SODIUM LACTATE AND CALCIUM CHLORIDE: 600; 310; 30; 20 INJECTION, SOLUTION INTRAVENOUS at 10:03

## 2024-03-07 RX ADMIN — ACETAMINOPHEN 1000 MG: 500 TABLET ORAL at 09:03

## 2024-03-07 RX ADMIN — IBUPROFEN 800 MG: 400 TABLET ORAL at 09:03

## 2024-03-07 RX ADMIN — ACETAMINOPHEN 1000 MG: 500 TABLET ORAL at 02:03

## 2024-03-07 RX ADMIN — GABAPENTIN 300 MG: 300 CAPSULE ORAL at 09:03

## 2024-03-07 RX ADMIN — ONDANSETRON 4 MG: 2 INJECTION INTRAMUSCULAR; INTRAVENOUS at 01:03

## 2024-03-07 RX ADMIN — TRAMADOL HYDROCHLORIDE 50 MG: 50 TABLET, COATED ORAL at 09:03

## 2024-03-07 RX ADMIN — ALPRAZOLAM 0.5 MG: 0.5 TABLET ORAL at 09:03

## 2024-03-07 RX ADMIN — GABAPENTIN 300 MG: 300 CAPSULE ORAL at 02:03

## 2024-03-07 RX ADMIN — HYDROMORPHONE HYDROCHLORIDE 1 MG: 1 INJECTION, SOLUTION INTRAMUSCULAR; INTRAVENOUS; SUBCUTANEOUS at 04:03

## 2024-03-07 RX ADMIN — IBUPROFEN 800 MG: 400 TABLET ORAL at 02:03

## 2024-03-07 RX ADMIN — AMLODIPINE BESYLATE 5 MG: 5 TABLET ORAL at 09:03

## 2024-03-07 RX ADMIN — PROCHLORPERAZINE EDISYLATE 5 MG: 5 INJECTION INTRAMUSCULAR; INTRAVENOUS at 04:03

## 2024-03-07 NOTE — ASSESSMENT & PLAN NOTE
37 yo F s/p laparoscopic total abdominal colectomy with stapled side-to-end ileorectal anastomosis 3/4 for slow-transit constipation.     - POD3  - NPO  - d/c NGT  - MMPC   - nausea meds prn   - f/u CRP on POD3  - DVT ppx   - OOB, ambulate   - Pathway     Dispo: [facility]

## 2024-03-07 NOTE — SUBJECTIVE & OBJECTIVE
Subjective:     Interval History: Had episode of nausea and emesis overnight, made NPO with NGT placed. Otherwise HDS, AF. Passing gas and having BM. Pain is controlled, abdomen soft, non distended    Post-Op Info:  Procedure(s) (LRB):  COLECTOMY, TOTAL, LAPAROSCOPIC,ERAS low (N/A)  SIGMOIDOSCOPY, FLEXIBLE (N/A)   3 Days Post-Op      Medications:  Continuous Infusions:  Scheduled Meds:   acetaminophen  1,000 mg Oral Q8H    amLODIPine  5 mg Oral QAM    enoxparin  40 mg Subcutaneous Daily    gabapentin  300 mg Oral TID    ibuprofen  800 mg Oral Q8H    mupirocin   Nasal BID     PRN Meds:   ALPRAZolam    ondansetron    oxyCODONE    oxyCODONE    prochlorperazine    sodium chloride 0.9%    traMADoL        Objective:     Vital Signs (Most Recent):  Temp: 98.6 °F (37 °C) (03/07/24 0456)  Pulse: 86 (03/07/24 0456)  Resp: 20 (03/07/24 0458)  BP: 138/89 (03/07/24 0456)  SpO2: 99 % (03/07/24 0456) Vital Signs (24h Range):  Temp:  [98.3 °F (36.8 °C)-98.9 °F (37.2 °C)] 98.6 °F (37 °C)  Pulse:  [78-93] 86  Resp:  [17-20] 20  SpO2:  [96 %-99 %] 99 %  BP: (121-157)/(75-89) 138/89     Intake/Output - Last 3 Shifts         03/05 0700  03/06 0659 03/06 0700  03/07 0659 03/07 0700  03/08 0659    P.O. 600 1320     IV Piggyback       Total Intake(mL/kg) 600 (6.9) 1320 (15.2)     Urine (mL/kg/hr)       Emesis/NG output  260     Drains  150     Stool  0     Total Output  410     Net +600 +910            Urine Occurrence 5 x 4 x     Stool Occurrence 0 x 1 x     Emesis Occurrence  1 x 1 x             Physical Exam  Constitutional:       Appearance: Normal appearance.   HENT:      Head: Normocephalic and atraumatic.      Nose:      Comments: NGT to LIWS     Mouth/Throat:      Mouth: Mucous membranes are moist.   Cardiovascular:      Rate and Rhythm: Regular rhythm.   Pulmonary:      Effort: Pulmonary effort is normal. No respiratory distress.   Abdominal:      General: Abdomen is flat. There is no distension.      Palpations: Abdomen is  "soft.      Tenderness: There is abdominal tenderness.      Comments: Incisions CDI   Skin:     General: Skin is warm.   Neurological:      General: No focal deficit present.      Mental Status: She is alert and oriented to person, place, and time. Mental status is at baseline.   Psychiatric:         Mood and Affect: Mood normal.         Behavior: Behavior normal.         Thought Content: Thought content normal.        Significant Labs:  BMP (Last 3 Results):   Recent Labs   Lab 03/05/24  0337   *      K 3.7      CO2 25   BUN 8   CREATININE 0.9   CALCIUM 8.9   MG 2.0     CBC (Last 3 Results):   Recent Labs   Lab 03/05/24  0337   WBC 15.17*   RBC 4.39   HGB 12.6   HCT 39.4      MCV 90   MCH 28.7   MCHC 32.0     CRP (Last 3 Results): No results for input(s): "CRP" in the last 168 hours.    Significant Diagnostics:  I have reviewed all pertinent imaging results/findings within the past 24 hours.  "

## 2024-03-07 NOTE — PROGRESS NOTES
[facility]  Colorectal Surgery  Progress Note    Patient Name: Priya Conrad  MRN: [id number]  Admission Date: 3/4/2024  Hospital Length of Stay: 3 days  Attending Physician: KOKO Naylor MD    Subjective:     Interval History: Had episode of nausea and emesis overnight, made NPO with NGT placed. Otherwise HDS, AF. Passing gas and having BM. Pain is controlled, abdomen soft, non distended    Post-Op Info:  Procedure(s) (LRB):  COLECTOMY, TOTAL, LAPAROSCOPIC,ERAS low (N/A)  SIGMOIDOSCOPY, FLEXIBLE (N/A)   3 Days Post-Op      Medications:  Continuous Infusions:  Scheduled Meds:   acetaminophen  1,000 mg Oral Q8H    amLODIPine  5 mg Oral QAM    enoxparin  40 mg Subcutaneous Daily    gabapentin  300 mg Oral TID    ibuprofen  800 mg Oral Q8H    mupirocin   Nasal BID     PRN Meds:   ALPRAZolam    ondansetron    oxyCODONE    oxyCODONE    prochlorperazine    sodium chloride 0.9%    traMADoL        Objective:     Vital Signs (Most Recent):  Temp: 98.6 °F (37 °C) (03/07/24 0456)  Pulse: 86 (03/07/24 0456)  Resp: 20 (03/07/24 0458)  BP: 138/89 (03/07/24 0456)  SpO2: 99 % (03/07/24 0456) Vital Signs (24h Range):  Temp:  [98.3 °F (36.8 °C)-98.9 °F (37.2 °C)] 98.6 °F (37 °C)  Pulse:  [78-93] 86  Resp:  [17-20] 20  SpO2:  [96 %-99 %] 99 %  BP: (121-157)/(75-89) 138/89     Intake/Output - Last 3 Shifts         03/05 0700  03/06 0659 03/06 0700  03/07 0659 03/07 0700  03/08 0659    P.O. 600 1320     IV Piggyback       Total Intake(mL/kg) 600 (6.9) 1320 (15.2)     Urine (mL/kg/hr)       Emesis/NG output  260     Drains  150     Stool  0     Total Output  410     Net +600 +910            Urine Occurrence 5 x 4 x     Stool Occurrence 0 x 1 x     Emesis Occurrence  1 x 1 x             Physical Exam  Constitutional:       Appearance: Normal appearance.   HENT:      Head: Normocephalic and atraumatic.      Nose:      Comments: NGT to LIWS     Mouth/Throat:      Mouth: Mucous membranes are moist.   Cardiovascular:      Rate  "and Rhythm: Regular rhythm.   Pulmonary:      Effort: Pulmonary effort is normal. No respiratory distress.   Abdominal:      General: Abdomen is flat. There is no distension.      Palpations: Abdomen is soft.      Tenderness: There is abdominal tenderness.      Comments: Incisions CDI   Skin:     General: Skin is warm.   Neurological:      General: No focal deficit present.      Mental Status: She is alert and oriented to person, place, and time. Mental status is at baseline.   Psychiatric:         Mood and Affect: Mood normal.         Behavior: Behavior normal.         Thought Content: Thought content normal.        Significant Labs:  BMP (Last 3 Results):   Recent Labs   Lab 03/05/24  0337   *      K 3.7      CO2 25   BUN 8   CREATININE 0.9   CALCIUM 8.9   MG 2.0     CBC (Last 3 Results):   Recent Labs   Lab 03/05/24 0337   WBC 15.17*   RBC 4.39   HGB 12.6   HCT 39.4      MCV 90   MCH 28.7   MCHC 32.0     CRP (Last 3 Results): No results for input(s): "CRP" in the last 168 hours.    Significant Diagnostics:  I have reviewed all pertinent imaging results/findings within the past 24 hours.  Assessment/Plan:     * Slow transit constipation  37 yo F s/p laparoscopic total abdominal colectomy with stapled side-to-end ileorectal anastomosis 3/4 for slow-transit constipation.     - POD3  - NPO  - d/c NGT  - MMPC   - nausea meds prn   - f/u CRP on POD3  - DVT ppx   - OOB, ambulate   - Pathway     Dispo: [facility] Chavez MD  Colorectal Surgery  [facility]        "

## 2024-03-07 NOTE — PLAN OF CARE
[facility] Plan of Care Note  Dx slow transit constipation     Shift Events: nausea     Goals of Care: dc     Neuro: intact     Vital Signs: normal     Respiratory: wdl     Diet: LFLR     Is patient tolerating current diet? yes     GTTS: none     Urine Output/Bowel Movement: adequate urine, lbm 3/6     Drains/Tubes/Tube Feeds (include total output/shift): none     Lines: piv        Accuchecks:none     Skin: lap sites, transverse lower abd incision, steri strips     Fall Risk Score: 6     Activity level? IND     Any scheduled procedures? none     Any safety concerns? no     Other: n/a

## 2024-03-08 LAB
CRP SERPL-MCNC: 24 MG/L (ref 0–8.2)
FINAL PATHOLOGIC DIAGNOSIS: NORMAL
GROSS: NORMAL
Lab: NORMAL

## 2024-03-08 PROCEDURE — 25000003 PHARM REV CODE 250: Performed by: STUDENT IN AN ORGANIZED HEALTH CARE EDUCATION/TRAINING PROGRAM

## 2024-03-08 PROCEDURE — 86140 C-REACTIVE PROTEIN: CPT | Performed by: STUDENT IN AN ORGANIZED HEALTH CARE EDUCATION/TRAINING PROGRAM

## 2024-03-08 PROCEDURE — 20600001 HC STEP DOWN PRIVATE ROOM

## 2024-03-08 PROCEDURE — 36415 COLL VENOUS BLD VENIPUNCTURE: CPT | Performed by: STUDENT IN AN ORGANIZED HEALTH CARE EDUCATION/TRAINING PROGRAM

## 2024-03-08 PROCEDURE — 25000003 PHARM REV CODE 250: Performed by: NURSE PRACTITIONER

## 2024-03-08 RX ADMIN — IBUPROFEN 800 MG: 400 TABLET ORAL at 05:03

## 2024-03-08 RX ADMIN — GABAPENTIN 300 MG: 300 CAPSULE ORAL at 08:03

## 2024-03-08 RX ADMIN — IBUPROFEN 800 MG: 400 TABLET ORAL at 02:03

## 2024-03-08 RX ADMIN — ACETAMINOPHEN 1000 MG: 500 TABLET ORAL at 05:03

## 2024-03-08 RX ADMIN — GABAPENTIN 300 MG: 300 CAPSULE ORAL at 02:03

## 2024-03-08 RX ADMIN — ACETAMINOPHEN 1000 MG: 500 TABLET ORAL at 02:03

## 2024-03-08 RX ADMIN — AMLODIPINE BESYLATE 5 MG: 5 TABLET ORAL at 08:03

## 2024-03-08 RX ADMIN — ACETAMINOPHEN 1000 MG: 500 TABLET ORAL at 08:03

## 2024-03-08 RX ADMIN — IBUPROFEN 800 MG: 400 TABLET ORAL at 08:03

## 2024-03-08 NOTE — ASSESSMENT & PLAN NOTE
39 yo F s/p laparoscopic total abdominal colectomy with stapled side-to-end ileorectal anastomosis 3/4 for slow-transit constipation.     - POD4  - Advance to low residue diet   - MMPC   - nausea meds prn   - CRP 3/8 24 (31)  - DVT ppx   - OOB, ambulate   - Pathway     Dispo: [facility], potentially discharge today vs tomorrow pending toleration of low res diet

## 2024-03-08 NOTE — PLAN OF CARE
[facility] Plan of Care Note  Dx slow transit constipation     Shift Events: none     Goals of Care: dc     Neuro: intact     Vital Signs: normal     Respiratory: wdl     Diet: clears     Is patient tolerating current diet? yes     GTTS: none     Urine Output/Bowel Movement: adequate urine, lbm 3/7     Drains/Tubes/Tube Feeds (include total output/shift): none     Lines: piv        Accuchecks:none     Skin: lap sites, transverse lower abd incision, steri strips     Fall Risk Score: 6     Activity level? IND     Any scheduled procedures? none     Any safety concerns? no     Other: n/a

## 2024-03-08 NOTE — SUBJECTIVE & OBJECTIVE
Subjective:     Interval History: No acute events overnight, HDS, AF. Pain is controlled, no N/V, incisions CDI, passing gas, having little BM. Tolerated clears     Post-Op Info:  Procedure(s) (LRB):  COLECTOMY, TOTAL, LAPAROSCOPIC,ERAS low (N/A)  SIGMOIDOSCOPY, FLEXIBLE (N/A)   4 Days Post-Op      Medications:  Continuous Infusions:  Scheduled Meds:   acetaminophen  1,000 mg Oral Q8H    amLODIPine  5 mg Oral QAM    enoxparin  40 mg Subcutaneous Daily    gabapentin  300 mg Oral TID    ibuprofen  800 mg Oral Q8H     PRN Meds:   ALPRAZolam    ondansetron    oxyCODONE    oxyCODONE    prochlorperazine    sodium chloride 0.9%    traMADoL        Objective:     Vital Signs (Most Recent):  Temp: 98.1 °F (36.7 °C) (03/08/24 0455)  Pulse: 83 (03/08/24 0455)  Resp: 16 (03/08/24 0455)  BP: (!) 112/57 (03/08/24 0455)  SpO2: 98 % (03/08/24 0455) Vital Signs (24h Range):  Temp:  [97.7 °F (36.5 °C)-98.5 °F (36.9 °C)] 98.1 °F (36.7 °C)  Pulse:  [] 83  Resp:  [16-19] 16  SpO2:  [97 %-100 %] 98 %  BP: (102-136)/(57-89) 112/57     Intake/Output - Last 3 Shifts         03/06 0700  03/07 0659 03/07 0700  03/08 0659 03/08 0700  03/09 0659    P.O. 1320      Total Intake(mL/kg) 1320 (15.2)      Emesis/NG output 260      Drains 150      Stool 0      Total Output 410      Net +910             Urine Occurrence 4 x 2 x     Stool Occurrence 1 x      Emesis Occurrence 1 x 1 x              Physical Exam  Constitutional:       Appearance: Normal appearance.   HENT:      Head: Normocephalic and atraumatic.      Mouth/Throat:      Mouth: Mucous membranes are moist.   Cardiovascular:      Rate and Rhythm: Regular rhythm.   Pulmonary:      Effort: Pulmonary effort is normal. No respiratory distress.   Abdominal:      General: Abdomen is flat. There is no distension.      Palpations: Abdomen is soft.      Tenderness: There is abdominal tenderness.      Comments: Incisions CDI   Skin:     General: Skin is warm.   Neurological:      General: No  focal deficit present.      Mental Status: She is alert and oriented to person, place, and time. Mental status is at baseline.   Psychiatric:         Mood and Affect: Mood normal.         Behavior: Behavior normal.         Thought Content: Thought content normal.          Significant Labs:  BMP (Last 3 Results):   Recent Labs   Lab 03/05/24  0337   *      K 3.7      CO2 25   BUN 8   CREATININE 0.9   CALCIUM 8.9   MG 2.0     CBC (Last 3 Results):   Recent Labs   Lab 03/05/24  0337   WBC 15.17*   RBC 4.39   HGB 12.6   HCT 39.4      MCV 90   MCH 28.7   MCHC 32.0     CRP (Last 3 Results):   Recent Labs   Lab 03/07/24  0710 03/08/24  0514   CRP 31.3* 24.0*       Significant Diagnostics:  I have reviewed all pertinent imaging results/findings within the past 24 hours.

## 2024-03-08 NOTE — PROGRESS NOTES
[facility]  Colorectal Surgery  Progress Note    Patient Name: Priya Conrad  MRN: [id number]  Admission Date: 3/4/2024  Hospital Length of Stay: 4 days  Attending Physician: KOKO Naylor MD    Subjective:     Interval History: No acute events overnight, HDS, AF. Pain is controlled, no N/V, incisions CDI, passing gas, having little BM. Tolerated clears     Post-Op Info:  Procedure(s) (LRB):  COLECTOMY, TOTAL, LAPAROSCOPIC,ERAS low (N/A)  SIGMOIDOSCOPY, FLEXIBLE (N/A)   4 Days Post-Op      Medications:  Continuous Infusions:  Scheduled Meds:   acetaminophen  1,000 mg Oral Q8H    amLODIPine  5 mg Oral QAM    enoxparin  40 mg Subcutaneous Daily    gabapentin  300 mg Oral TID    ibuprofen  800 mg Oral Q8H     PRN Meds:   ALPRAZolam    ondansetron    oxyCODONE    oxyCODONE    prochlorperazine    sodium chloride 0.9%    traMADoL        Objective:     Vital Signs (Most Recent):  Temp: 98.1 °F (36.7 °C) (03/08/24 0455)  Pulse: 83 (03/08/24 0455)  Resp: 16 (03/08/24 0455)  BP: (!) 112/57 (03/08/24 0455)  SpO2: 98 % (03/08/24 0455) Vital Signs (24h Range):  Temp:  [97.7 °F (36.5 °C)-98.5 °F (36.9 °C)] 98.1 °F (36.7 °C)  Pulse:  [] 83  Resp:  [16-19] 16  SpO2:  [97 %-100 %] 98 %  BP: (102-136)/(57-89) 112/57     Intake/Output - Last 3 Shifts         03/06 0700  03/07 0659 03/07 0700 03/08 0659 03/08 0700  03/09 0659    P.O. 1320      Total Intake(mL/kg) 1320 (15.2)      Emesis/NG output 260      Drains 150      Stool 0      Total Output 410      Net +910             Urine Occurrence 4 x 2 x     Stool Occurrence 1 x      Emesis Occurrence 1 x 1 x              Physical Exam  Constitutional:       Appearance: Normal appearance.   HENT:      Head: Normocephalic and atraumatic.      Mouth/Throat:      Mouth: Mucous membranes are moist.   Cardiovascular:      Rate and Rhythm: Regular rhythm.   Pulmonary:      Effort: Pulmonary effort is normal. No respiratory distress.   Abdominal:      General: Abdomen is  2020 10:24 flat. There is no distension.      Palpations: Abdomen is soft.      Tenderness: There is abdominal tenderness.      Comments: Incisions CDI   Skin:     General: Skin is warm.   Neurological:      General: No focal deficit present.      Mental Status: She is alert and oriented to person, place, and time. Mental status is at baseline.   Psychiatric:         Mood and Affect: Mood normal.         Behavior: Behavior normal.         Thought Content: Thought content normal.          Significant Labs:  BMP (Last 3 Results):   Recent Labs   Lab 03/05/24  0337   *      K 3.7      CO2 25   BUN 8   CREATININE 0.9   CALCIUM 8.9   MG 2.0     CBC (Last 3 Results):   Recent Labs   Lab 03/05/24  0337   WBC 15.17*   RBC 4.39   HGB 12.6   HCT 39.4      MCV 90   MCH 28.7   MCHC 32.0     CRP (Last 3 Results):   Recent Labs   Lab 03/07/24  0710 03/08/24  0514   CRP 31.3* 24.0*       Significant Diagnostics:  I have reviewed all pertinent imaging results/findings within the past 24 hours.  Assessment/Plan:     * Slow transit constipation  37 yo F s/p laparoscopic total abdominal colectomy with stapled side-to-end ileorectal anastomosis 3/4 for slow-transit constipation.     - POD4  - Advance to low residue diet   - MMPC   - nausea meds prn   - CRP 3/8 24 (31)  - DVT ppx   - OOB, ambulate   - Pathway     Dispo: [facility], potentially discharge today vs tomorrow pending toleration of low res diet           Darryl Chavez MD  Colorectal Surgery  [facility]

## 2024-03-09 VITALS
BODY MASS INDEX: 31.81 KG/M2 | SYSTOLIC BLOOD PRESSURE: 143 MMHG | TEMPERATURE: 98 F | RESPIRATION RATE: 19 BRPM | DIASTOLIC BLOOD PRESSURE: 82 MMHG | HEIGHT: 65 IN | OXYGEN SATURATION: 100 % | WEIGHT: 190.94 LBS | HEART RATE: 81 BPM

## 2024-03-09 LAB — CRP SERPL-MCNC: 15.1 MG/L (ref 0–8.2)

## 2024-03-09 PROCEDURE — 25000003 PHARM REV CODE 250: Performed by: STUDENT IN AN ORGANIZED HEALTH CARE EDUCATION/TRAINING PROGRAM

## 2024-03-09 PROCEDURE — 25000003 PHARM REV CODE 250: Performed by: NURSE PRACTITIONER

## 2024-03-09 PROCEDURE — 36415 COLL VENOUS BLD VENIPUNCTURE: CPT | Performed by: STUDENT IN AN ORGANIZED HEALTH CARE EDUCATION/TRAINING PROGRAM

## 2024-03-09 PROCEDURE — 86140 C-REACTIVE PROTEIN: CPT | Performed by: STUDENT IN AN ORGANIZED HEALTH CARE EDUCATION/TRAINING PROGRAM

## 2024-03-09 RX ORDER — OXYCODONE HYDROCHLORIDE 5 MG/1
5 TABLET ORAL EVERY 6 HOURS PRN
Qty: 18 TABLET | Refills: 0 | Status: SHIPPED | OUTPATIENT
Start: 2024-03-09 | End: 2024-03-22 | Stop reason: SDUPTHER

## 2024-03-09 RX ADMIN — AMLODIPINE BESYLATE 5 MG: 5 TABLET ORAL at 06:03

## 2024-03-09 RX ADMIN — GABAPENTIN 300 MG: 300 CAPSULE ORAL at 09:03

## 2024-03-09 RX ADMIN — ACETAMINOPHEN 1000 MG: 500 TABLET ORAL at 06:03

## 2024-03-09 RX ADMIN — IBUPROFEN 800 MG: 400 TABLET ORAL at 06:03

## 2024-03-09 NOTE — PLAN OF CARE
[facility] Plan of Care Note  Dx slow transit constipation     Shift Events: none     Goals of Care: dc     Neuro: intact     Vital Signs: normal     Respiratory: wdl     Diet: clears     Is patient tolerating current diet? yes     GTTS: none     Urine Output/Bowel Movement: adequate urine, lbm 3/8     Drains/Tubes/Tube Feeds (include total output/shift): none     Lines: piv      Accuchecks:none     Skin: lap sites, transverse lower abd incision, steri strips     Fall Risk Score: 6     Activity level? IND     Any scheduled procedures? none     Any safety concerns? no     Other: possible d/c 3/9

## 2024-03-09 NOTE — DISCHARGE SUMMARY
[facility]  Colorectal Surgery  Discharge Summary      Patient Name: Priya Conrad  MRN: [id number]  Admission Date: 3/4/2024  Hospital Length of Stay: 5 days  Discharge Date and Time:  03/09/2024 9:33 AM  Attending Physician: KOKO Naylor MD   Discharging Provider: Aarti Estrella MD  Primary Care Provider: Vinny Rose MD     HPI:  Patient is a 38 y.o. female presents with defecatory dysfunction.      Seen by GI for evaluation for constipation  Taking linzess 290 every morning. Not really helping.  trulance was effective, then stopped working   Tried motegrity - got bad headaches. Not taking anymore  Failed amitiza  Tried pelvic physical therapy with no significant improvement.  Tried intermittent enemas with no significant improvement.  Restarted a regimen of Linzess 290 mcg every day.  This was ineffective.  She then restarted Trulance.  she will not have any bowel movements unless she takes a laxative.  With a laxative, she will have bowel movements once per week.  Without a laxative, she will have bowel movements once per month.  She complains of significant bloating, abdominal distention, pain, and inability to function normally secondary to her discomfort.  Thyroid function normal.    Procedure(s) (LRB):  COLECTOMY, TOTAL, LAPAROSCOPIC,ERAS low (N/A)  SIGMOIDOSCOPY, FLEXIBLE (N/A)     Hospital Course:  Please see the preoperative H&P and other available documentation for full details related to history prior to this admission.  Briefly, Priya Conrad is a 38 y.o. y.o. female who was admitted following scheduled elective laparoscopic total colectomy.      Following a complete preoperative discussion of the risks and benefits of surgery with signed informed consent, the patient was taken to the operating room on 3/4/24 and underwent the above stated procedures. The patient tolerated surgery well and there were no complications. Please see the operative report for full intraoperative  findings and details.       Postoperatively, the patient did well and was transferred from the PACU to the floor in stable condition where they had a stable and uncomplicated hospital course. Patient had an episode of nausea and emesis on 3/6 and was treated with Ngt placement and made NPO. It was removed shortly thereafter, she continued having bowel function, and diet was advanced again.     Labs and vital signs remained stable and appropriate throughout course. Diet was advanced as tolerated and the patient's pain was controlled on oral pain medications without problem. She continued to have bowel function. Currently, the patient is doing well and is stable and appropriate for discharge at this time. Discharge instructions discussed with patient at bedside, all questions and concerns addressed.       Goals of Care Treatment Preferences:  Code Status: Full Code          Significant Diagnostic Studies: N/A    Pending Diagnostic Studies:       None          Final Active Diagnoses:    Diagnosis Date Noted POA    PRINCIPAL PROBLEM:  Slow transit constipation [K59.01] 02/28/2024 Yes    Essential hypertension [I10] 01/27/2021 Yes    BMI 33.0-33.9,adult [Z68.33] 01/27/2021 Not Applicable      Problems Resolved During this Admission:      Discharged Condition: good    Disposition: Home or Self Care    Follow Up:    Patient Instructions:      Diet Adult Regular     Notify your health care provider if you experience any of the following:  temperature >100.4     Notify your health care provider if you experience any of the following:  persistent nausea and vomiting or diarrhea     Notify your health care provider if you experience any of the following:  severe uncontrolled pain     Notify your health care provider if you experience any of the following:  redness, tenderness, or signs of infection (pain, swelling, redness, odor or green/yellow discharge around incision site)     Notify your health care provider if you  experience any of the following:  difficulty breathing or increased cough     No dressing needed     Weight bearing restrictions (specify):   Order Comments: Do not lift greater than 10 lbs for 4 weeks     Medications:  Reconciled Home Medications:      Medication List        START taking these medications      oxyCODONE 5 MG immediate release tablet  Commonly known as: ROXICODONE  Take 1 tablet (5 mg total) by mouth every 6 (six) hours as needed.            CONTINUE taking these medications      ALPRAZolam 0.5 MG tablet  Commonly known as: XANAX  Take 0.5 mg by mouth daily as needed.     amLODIPine 5 MG tablet  Commonly known as: NORVASC  Take 5 mg by mouth every morning.     FLUoxetine 20 MG capsule  Take 20 mg by mouth once daily.     gabapentin 300 MG capsule  Commonly known as: NEURONTIN  Take 600 mg by mouth every evening.     lactulose 20 gram/30 mL Soln  Commonly known as: CHRONULAC  Take 30 mLs (20 g total) by mouth 2 (two) times daily.     linaCLOtide 290 mcg Cap capsule  Commonly known as: LINZESS  Take 1 capsule (290 mcg total) by mouth before breakfast.     propranoloL 120 MG 24 hr capsule  Commonly known as: INDERAL LA     tiZANidine 4 mg Cap  Take 4 mg by mouth daily as needed.     TRULANCE 3 mg Tab  Generic drug: plecanatide  Take 1 tablet by mouth.              Aarti Estrella MD  Colorectal Surgery  [facility]

## 2024-03-09 NOTE — ASSESSMENT & PLAN NOTE
39 yo F s/p laparoscopic total abdominal colectomy with stapled side-to-end ileorectal anastomosis 3/4 for slow-transit constipation.     - continue LRD  - MMPC   - nausea meds prn   - CRP downtrending: 15 (24) (31)  - DVT ppx   - OOB, ambulate   - Pathway     Dispo: GISSU, tolerating diet and likely stable for discharge

## 2024-03-09 NOTE — HOSPITAL COURSE
Please see the preoperative H&P and other available documentation for full details related to history prior to this admission.  Briefly, Priya Conrad is a 38 y.o. y.o. female who was admitted following scheduled elective laparoscopic total colectomy.      Following a complete preoperative discussion of the risks and benefits of surgery with signed informed consent, the patient was taken to the operating room on 3/4/24 and underwent the above stated procedures. The patient tolerated surgery well and there were no complications. Please see the operative report for full intraoperative findings and details.       Postoperatively, the patient did well and was transferred from the PACU to the floor in stable condition where they had a stable and uncomplicated hospital course. Patient had an episode of nausea and emesis on 3/6 and was treated with Ngt placement and made NPO. It was removed shortly thereafter, she continued having bowel function, and diet was advanced again.     Labs and vital signs remained stable and appropriate throughout course. Diet was advanced as tolerated and the patient's pain was controlled on oral pain medications without problem. She continued to have bowel function. Currently, the patient is doing well and is stable and appropriate for discharge at this time. Discharge instructions discussed with patient at bedside, all questions and concerns addressed.

## 2024-03-09 NOTE — SUBJECTIVE & OBJECTIVE
Subjective:     Interval History: Pain well controlled. Tolerating some PO intake, no nausea or vomiting. Diet advanced yesterday. Having bowel movements this AM.    Post-Op Info:  Procedure(s) (LRB):  COLECTOMY, TOTAL, LAPAROSCOPIC,ERAS low (N/A)  SIGMOIDOSCOPY, FLEXIBLE (N/A)   5 Days Post-Op      Medications:  Continuous Infusions:  Scheduled Meds:   acetaminophen  1,000 mg Oral Q8H    amLODIPine  5 mg Oral QAM    enoxparin  40 mg Subcutaneous Daily    gabapentin  300 mg Oral TID    ibuprofen  800 mg Oral Q8H     PRN Meds:   ALPRAZolam    ondansetron    oxyCODONE    oxyCODONE    prochlorperazine    sodium chloride 0.9%    traMADoL        Objective:     Vital Signs (Most Recent):  Temp: 98.4 °F (36.9 °C) (03/09/24 0744)  Pulse: 84 (03/09/24 0744)  Resp: 20 (03/09/24 0744)  BP: 131/71 (03/09/24 0744)  SpO2: 99 % (03/09/24 0744) Vital Signs (24h Range):  Temp:  [97.9 °F (36.6 °C)-98.9 °F (37.2 °C)] 98.4 °F (36.9 °C)  Pulse:  [] 84  Resp:  [18-20] 20  SpO2:  [97 %-99 %] 99 %  BP: (113-142)/(64-89) 131/71     Intake/Output - Last 3 Shifts         03/07 0700 03/08 0659 03/08 0700 03/09 0659 03/09 0700  03/10 0659    P.O.  480     Total Intake(mL/kg)  480 (5.5)     Emesis/NG output       Drains       Stool       Total Output       Net  +480            Urine Occurrence 2 x 4 x     Stool Occurrence  1 x     Emesis Occurrence 1 x               Physical Exam  Constitutional:       Appearance: Normal appearance.   HENT:      Head: Normocephalic and atraumatic.      Mouth/Throat:      Mouth: Mucous membranes are moist.   Cardiovascular:      Rate and Rhythm: Regular rhythm.   Pulmonary:      Effort: Pulmonary effort is normal. No respiratory distress.   Abdominal:      General: Abdomen is flat. There is no distension.      Palpations: Abdomen is soft.      Tenderness: There is abdominal tenderness.      Comments: Incisions CDI   Skin:     General: Skin is warm.   Neurological:      General: No focal deficit present.       Mental Status: She is alert and oriented to person, place, and time. Mental status is at baseline.   Psychiatric:         Mood and Affect: Mood normal.         Behavior: Behavior normal.         Thought Content: Thought content normal.            Significant Labs:  All pertinent lab results within the last 24 hours have been reviewed.     Significant Diagnostics:  None

## 2024-03-09 NOTE — PROGRESS NOTES
[facility]  Colorectal Surgery  Progress Note    Patient Name: Priya Conrad  MRN: [id number]  Admission Date: 3/4/2024  Hospital Length of Stay: 5 days  Attending Physician: KOKO Naylor MD    Subjective:     Interval History: Pain well controlled. Tolerating some PO intake, no nausea or vomiting. Diet advanced yesterday. Having bowel movements this AM.    Post-Op Info:  Procedure(s) (LRB):  COLECTOMY, TOTAL, LAPAROSCOPIC,ERAS low (N/A)  SIGMOIDOSCOPY, FLEXIBLE (N/A)   5 Days Post-Op      Medications:  Continuous Infusions:  Scheduled Meds:   acetaminophen  1,000 mg Oral Q8H    amLODIPine  5 mg Oral QAM    enoxparin  40 mg Subcutaneous Daily    gabapentin  300 mg Oral TID    ibuprofen  800 mg Oral Q8H     PRN Meds:   ALPRAZolam    ondansetron    oxyCODONE    oxyCODONE    prochlorperazine    sodium chloride 0.9%    traMADoL        Objective:     Vital Signs (Most Recent):  Temp: 98.4 °F (36.9 °C) (03/09/24 0744)  Pulse: 84 (03/09/24 0744)  Resp: 20 (03/09/24 0744)  BP: 131/71 (03/09/24 0744)  SpO2: 99 % (03/09/24 0744) Vital Signs (24h Range):  Temp:  [97.9 °F (36.6 °C)-98.9 °F (37.2 °C)] 98.4 °F (36.9 °C)  Pulse:  [] 84  Resp:  [18-20] 20  SpO2:  [97 %-99 %] 99 %  BP: (113-142)/(64-89) 131/71     Intake/Output - Last 3 Shifts         03/07 0700  03/08 0659 03/08 0700 03/09 0659 03/09 0700  03/10 0659    P.O.  480     Total Intake(mL/kg)  480 (5.5)     Emesis/NG output       Drains       Stool       Total Output       Net  +480            Urine Occurrence 2 x 4 x     Stool Occurrence  1 x     Emesis Occurrence 1 x               Physical Exam  Constitutional:       Appearance: Normal appearance.   HENT:      Head: Normocephalic and atraumatic.      Mouth/Throat:      Mouth: Mucous membranes are moist.   Cardiovascular:      Rate and Rhythm: Regular rhythm.   Pulmonary:      Effort: Pulmonary effort is normal. No respiratory distress.   Abdominal:      General: Abdomen is flat. There is no  distension.      Palpations: Abdomen is soft.      Tenderness: There is abdominal tenderness.      Comments: Incisions CDI   Skin:     General: Skin is warm.   Neurological:      General: No focal deficit present.      Mental Status: She is alert and oriented to person, place, and time. Mental status is at baseline.   Psychiatric:         Mood and Affect: Mood normal.         Behavior: Behavior normal.         Thought Content: Thought content normal.            Significant Labs:  All pertinent lab results within the last 24 hours have been reviewed.     Significant Diagnostics:  None  Assessment/Plan:     * Slow transit constipation  37 yo F s/p laparoscopic total abdominal colectomy with stapled side-to-end ileorectal anastomosis 3/4 for slow-transit constipation.     - continue LRD  - MMPC   - nausea meds prn   - CRP downtrending: 15 (24) (31)  - DVT ppx   - OOB, ambulate   - Pathway     Dispo: EMMA, tolerating diet and likely stable for discharge           Aarti Estrella MD  Colorectal Surgery  [facility]

## 2024-03-09 NOTE — PLAN OF CARE
[facility]  Discharge Final Note    Primary Care Provider: Vinny Rose MD  Expected Discharge Date: 3/9/2024    Patient medically ready for discharge to home.     Transportation by family.     Is family/patient aware of discharge: yes     Hospital follow up scheduled: yes       Final Discharge Note (most recent)       Final Note - 03/09/24 1024          Final Note    Assessment Type Final Discharge Note     Anticipated Discharge Disposition Home or Self Care     Hospital Resources/Appts/Education Provided Provided patient/caregiver with written discharge plan information                     Important Message from Medicare         Referral Info (most recent)       Referral Info    No documentation.                   Future Appointments   Date Time Provider Department Center   3/22/2024  1:20 PM KOKO Naylor MD [facility]   5/29/2024  3:00 PM Ariane Parker MD [facility]  Case Management  Ext: [phone]  03/09/2024

## 2024-03-11 ENCOUNTER — PATIENT OUTREACH (OUTPATIENT)
Dept: ADMINISTRATIVE | Facility: CLINIC | Age: 39
End: 2024-03-11
Payer: COMMERCIAL

## 2024-03-11 ENCOUNTER — NURSE TRIAGE (OUTPATIENT)
Dept: ADMINISTRATIVE | Facility: CLINIC | Age: 39
End: 2024-03-11
Payer: COMMERCIAL

## 2024-03-11 ENCOUNTER — HOSPITAL ENCOUNTER (EMERGENCY)
Facility: HOSPITAL | Age: 39
Discharge: HOME | End: 2024-03-11
Attending: EMERGENCY MEDICINE
Payer: COMMERCIAL

## 2024-03-11 VITALS
HEIGHT: 65 IN | WEIGHT: 197 LBS | HEART RATE: 69 BPM | BODY MASS INDEX: 32.82 KG/M2 | OXYGEN SATURATION: 99 % | RESPIRATION RATE: 18 BRPM | SYSTOLIC BLOOD PRESSURE: 121 MMHG | DIASTOLIC BLOOD PRESSURE: 69 MMHG | TEMPERATURE: 98 F

## 2024-03-11 DIAGNOSIS — E87.6 HYPOKALEMIA: Primary | ICD-10-CM

## 2024-03-11 DIAGNOSIS — Z90.49 STATUS POST LAPAROSCOPIC COLECTOMY: ICD-10-CM

## 2024-03-11 LAB
ALBUMIN SERPL BCP-MCNC: 4 G/DL (ref 3.5–5.2)
ALP SERPL-CCNC: 63 U/L (ref 38–126)
ALT SERPL W/O P-5'-P-CCNC: 16 U/L (ref 10–44)
ANION GAP SERPL CALC-SCNC: 9 MMOL/L (ref 8–16)
AST SERPL-CCNC: 22 U/L (ref 15–46)
B-HCG UR QL: NEGATIVE
BASOPHILS # BLD AUTO: 0.04 K/UL (ref 0–0.2)
BASOPHILS NFR BLD: 0.2 % (ref 0–1.9)
BILIRUB SERPL-MCNC: 0.7 MG/DL (ref 0.1–1)
BILIRUB UR QL STRIP: NEGATIVE
CALCIUM SERPL-MCNC: 9 MG/DL (ref 8.7–10.5)
CHLORIDE SERPL-SCNC: 96 MMOL/L (ref 95–110)
CK SERPL-CCNC: 54 U/L (ref 55–170)
CLARITY UR REFRACT.AUTO: CLEAR
CO2 SERPL-SCNC: 32 MMOL/L (ref 23–29)
COLOR UR AUTO: YELLOW
CREAT SERPL-MCNC: 0.85 MG/DL (ref 0.5–1.4)
CRP SERPL-MCNC: 6.34 MG/DL (ref 0–1)
CTP QC/QA: YES
DIFFERENTIAL METHOD BLD: ABNORMAL
EOSINOPHIL # BLD AUTO: 0.1 K/UL (ref 0–0.5)
EOSINOPHIL NFR BLD: 0.3 % (ref 0–8)
ERYTHROCYTE [DISTWIDTH] IN BLOOD BY AUTOMATED COUNT: 14.3 % (ref 11.5–14.5)
EST. GFR  (NO RACE VARIABLE): >60 ML/MIN/1.73 M^2
GLUCOSE SERPL-MCNC: 107 MG/DL (ref 70–110)
GLUCOSE UR QL STRIP: NEGATIVE
HCT VFR BLD AUTO: 37.7 % (ref 37–48.5)
HGB BLD-MCNC: 12.4 G/DL (ref 12–16)
HGB UR QL STRIP: ABNORMAL
IMM GRANULOCYTES # BLD AUTO: 0.08 K/UL (ref 0–0.04)
IMM GRANULOCYTES NFR BLD AUTO: 0.5 % (ref 0–0.5)
KETONES UR QL STRIP: NEGATIVE
LEUKOCYTE ESTERASE UR QL STRIP: NEGATIVE
LIPASE SERPL-CCNC: 44 U/L (ref 23–300)
LYMPHOCYTES # BLD AUTO: 1.4 K/UL (ref 1–4.8)
LYMPHOCYTES NFR BLD: 7.8 % (ref 18–48)
MAGNESIUM SERPL-MCNC: 2.1 MG/DL (ref 1.6–2.6)
MCH RBC QN AUTO: 28.9 PG (ref 27–31)
MCHC RBC AUTO-ENTMCNC: 32.9 G/DL (ref 32–36)
MCV RBC AUTO: 88 FL (ref 82–98)
MONOCYTES # BLD AUTO: 1.7 K/UL (ref 0.3–1)
MONOCYTES NFR BLD: 9.4 % (ref 4–15)
NEUTROPHILS # BLD AUTO: 14.3 K/UL (ref 1.8–7.7)
NEUTROPHILS NFR BLD: 81.8 % (ref 38–73)
NITRITE UR QL STRIP: NEGATIVE
NRBC BLD-RTO: 0 /100 WBC
PH UR STRIP: 6 [PH] (ref 5–8)
PLATELET # BLD AUTO: 316 K/UL (ref 150–450)
PMV BLD AUTO: 9.8 FL (ref 9.2–12.9)
POTASSIUM SERPL-SCNC: 3.1 MMOL/L (ref 3.5–5.1)
PROT SERPL-MCNC: 7.5 G/DL (ref 6–8.4)
PROT UR QL STRIP: NEGATIVE
RBC # BLD AUTO: 4.29 M/UL (ref 4–5.4)
SODIUM SERPL-SCNC: 137 MMOL/L (ref 136–145)
SP GR UR STRIP: <=1.005 (ref 1–1.03)
URN SPEC COLLECT METH UR: ABNORMAL
UROBILINOGEN UR STRIP-ACNC: NEGATIVE EU/DL
UUN UR-MCNC: 6 MG/DL (ref 7–17)
WBC # BLD AUTO: 17.47 K/UL (ref 3.9–12.7)

## 2024-03-11 PROCEDURE — 86140 C-REACTIVE PROTEIN: CPT | Mod: ER

## 2024-03-11 PROCEDURE — 82550 ASSAY OF CK (CPK): CPT | Mod: ER

## 2024-03-11 PROCEDURE — 99285 EMERGENCY DEPT VISIT HI MDM: CPT | Mod: 25,ER

## 2024-03-11 PROCEDURE — 80053 COMPREHEN METABOLIC PANEL: CPT | Mod: ER

## 2024-03-11 PROCEDURE — 96361 HYDRATE IV INFUSION ADD-ON: CPT | Mod: ER

## 2024-03-11 PROCEDURE — 83735 ASSAY OF MAGNESIUM: CPT | Mod: ER

## 2024-03-11 PROCEDURE — 81025 URINE PREGNANCY TEST: CPT | Mod: ER

## 2024-03-11 PROCEDURE — 85025 COMPLETE CBC W/AUTO DIFF WBC: CPT | Mod: ER

## 2024-03-11 PROCEDURE — 96374 THER/PROPH/DIAG INJ IV PUSH: CPT | Mod: 59,ER

## 2024-03-11 PROCEDURE — 63600175 PHARM REV CODE 636 W HCPCS: Mod: ER

## 2024-03-11 PROCEDURE — 25500020 PHARM REV CODE 255: Mod: ER

## 2024-03-11 PROCEDURE — 83690 ASSAY OF LIPASE: CPT | Mod: ER

## 2024-03-11 PROCEDURE — 25000003 PHARM REV CODE 250: Mod: ER

## 2024-03-11 PROCEDURE — 81003 URINALYSIS AUTO W/O SCOPE: CPT | Mod: ER

## 2024-03-11 RX ORDER — MORPHINE SULFATE 4 MG/ML
4 INJECTION, SOLUTION INTRAMUSCULAR; INTRAVENOUS
Status: COMPLETED | OUTPATIENT
Start: 2024-03-11 | End: 2024-03-11

## 2024-03-11 RX ORDER — POTASSIUM CHLORIDE 20 MEQ/1
40 TABLET, EXTENDED RELEASE ORAL
Status: COMPLETED | OUTPATIENT
Start: 2024-03-11 | End: 2024-03-11

## 2024-03-11 RX ADMIN — POTASSIUM CHLORIDE 40 MEQ: 1500 TABLET, EXTENDED RELEASE ORAL at 02:03

## 2024-03-11 RX ADMIN — SODIUM CHLORIDE, POTASSIUM CHLORIDE, SODIUM LACTATE AND CALCIUM CHLORIDE 1000 ML: 600; 310; 30; 20 INJECTION, SOLUTION INTRAVENOUS at 12:03

## 2024-03-11 RX ADMIN — MORPHINE SULFATE 4 MG: 4 INJECTION INTRAVENOUS at 12:03

## 2024-03-11 RX ADMIN — IOHEXOL 100 ML: 350 INJECTION, SOLUTION INTRAVENOUS at 01:03

## 2024-03-11 NOTE — ED PROVIDER NOTES
Encounter Date: 3/11/2024       History     Chief Complaint   Patient presents with    Fatigue     S/P colectomy. PT reprots increased weakness, diarrhea and leg cramps     Priya Conrad is a 38 y.o. female  has a past medical history of Anxiety, Heart murmur, Hypertension, and Migraine. presenting to the Emergency Department for increased weakness, diarrhea, leg cramps x2 days.  Patient underwent a colectomy on .  Reports no complications following the surgery.  No fevers.  Reports chills.  No shortness of breath or chest pain.  No nausea or vomiting.  Reports abdominal pain/cramping for which she has been taking her prescribed oxycodone.  Last taken around 8:00 a.m. this morning.  Patient reports drinking fluids.  Reports bowels movements have been looser than normal.  She has been frequently reports a small amount of stool in each movement.  No blood or mucus.  Patient reports since the surgery abdominal pain has been manageable.  The pain right be slightly more increased today but she feels that is just her bowels waking up like they told her would happen.      The history is provided by the patient.     Review of patient's allergies indicates:  No Known Allergies  Past Medical History:   Diagnosis Date    Anxiety     Heart murmur     Hypertension     Migraine      Past Surgical History:   Procedure Laterality Date     SECTION      COLONOSCOPY  10/20/2021    COLONOSCOPY N/A 10/20/2021    Procedure: COLONOSCOPY 2 day hanny;  Surgeon: Zheng Woodruff MD;  Location: [facility];  Service: Endoscopy;  Laterality: N/A;    EPIDURAL STEROID INJECTION INTO CERVICAL SPINE N/A 2023    Procedure: C7-T1 LIZANDRO;  Surgeon: Eric Jessica MD;  Location: [facility] PAIN MANAGEMENT;  Service: Pain Management;  Laterality: N/A;  30 mins    EPIDURAL STEROID INJECTION INTO CERVICAL SPINE N/A 2023    Procedure: C7-T1 LIZANDRO;  Surgeon: Jose Metcalf DO;  Location: [facility] PAIN MANAGEMENT;  Service:  Pain Management;  Laterality: N/A;  15 mins    ESOPHAGOGASTRODUODENOSCOPY N/A 10/20/2021    Procedure: EGD (ESOPHAGOGASTRODUODENOSCOPY);  Surgeon: Zheng Woodruff MD;  Location: [facility] ENDO;  Service: Endoscopy;  Laterality: N/A;    FLEXIBLE SIGMOIDOSCOPY N/A 3/4/2024    Procedure: SIGMOIDOSCOPY, FLEXIBLE;  Surgeon: KOKO Naylor MD;  Location: [facility] OR 2ND FLR;  Service: Colon and Rectal;  Laterality: N/A;    LAPAROSCOPIC APPENDECTOMY N/A 7/10/2023    Procedure: APPENDECTOMY, LAPAROSCOPIC;  Surgeon: Ki De La Fuente MD;  Location: [facility] OR;  Service: General;  Laterality: N/A;    LAPAROSCOPIC TOTAL COLECTOMY N/A 3/4/2024    Procedure: COLECTOMY, TOTAL, LAPAROSCOPIC,ERAS low;  Surgeon: KOKO Naylor MD;  Location: [facility] OR 2ND FLR;  Service: Colon and Rectal;  Laterality: N/A;    TUBAL LIGATION      Tummy Tuck      UPPER GASTROINTESTINAL ENDOSCOPY  10/20/2021     Family History   Problem Relation Age of Onset    Hypertension Mother     Breast cancer Paternal Aunt     Heart failure Maternal Grandmother     Heart failure Maternal Grandfather      Social History     Tobacco Use    Smoking status: Never    Smokeless tobacco: Never   Substance Use Topics    Alcohol use: Not Currently     Comment: socially    Drug use: No     Review of Systems   Constitutional:  Negative for fever.   HENT:  Negative for sore throat.    Respiratory:  Negative for shortness of breath.    Cardiovascular:  Negative for chest pain.   Gastrointestinal:  Positive for abdominal pain and diarrhea. Negative for constipation, nausea and vomiting.   Genitourinary:  Negative for dysuria.   Musculoskeletal:  Negative for back pain.   Skin:  Negative for rash.   Neurological:  Negative for weakness.   Hematological:  Does not bruise/bleed easily.   All other systems reviewed and are negative.      Physical Exam     Initial Vitals [03/11/24 1107]   BP Pulse Resp Temp SpO2   129/75 88 18 98.2 °F (36.8 °C) 99 %      MAP       --          Physical Exam    Nursing note and vitals reviewed.  Constitutional: She appears well-developed and well-nourished. She is not diaphoretic. No distress.   HENT:   Head: Normocephalic.   Right Ear: Hearing, tympanic membrane and external ear normal.   Left Ear: Hearing, tympanic membrane and external ear normal.   Nose: Nose normal.   Mouth/Throat: Oropharynx is clear and moist and mucous membranes are normal.   Eyes: Conjunctivae, EOM and lids are normal. Pupils are equal, round, and reactive to light.   Neck: Neck supple.   Normal range of motion.  Cardiovascular:  Normal rate, regular rhythm, normal heart sounds and intact distal pulses.           Pulmonary/Chest: Breath sounds normal. No respiratory distress. She has no wheezes. She has no rhonchi.   Abdominal: Abdomen is soft. Bowel sounds are normal. There is generalized abdominal tenderness. There is no rebound and no guarding.   Musculoskeletal:         General: Normal range of motion.      Cervical back: Normal range of motion and neck supple. No rigidity.     Lymphadenopathy:     She has no cervical adenopathy.   Neurological: She is alert and oriented to person, place, and time. She has normal strength. GCS score is 15. GCS eye subscore is 4. GCS verbal subscore is 5. GCS motor subscore is 6.   Skin: Skin is warm and dry. Capillary refill takes less than 2 seconds. No rash noted.   Psychiatric: She has a normal mood and affect. Her behavior is normal. Judgment and thought content normal.         ED Course   Procedures  Labs Reviewed   CBC W/ AUTO DIFFERENTIAL - Abnormal; Notable for the following components:       Result Value    WBC 17.47 (*)     Gran # (ANC) 14.3 (*)     Immature Grans (Abs) 0.08 (*)     Mono # 1.7 (*)     Gran % 81.8 (*)     Lymph % 7.8 (*)     All other components within normal limits   COMPREHENSIVE METABOLIC PANEL - Abnormal; Notable for the following components:    Potassium 3.1 (*)     CO2 32 (*)     BUN 6 (*)     All other  components within normal limits   URINALYSIS, REFLEX TO URINE CULTURE - Abnormal; Notable for the following components:    Specific Gravity, UA <=1.005 (*)     Occult Blood UA Trace (*)     All other components within normal limits    Narrative:     Preferred Collection Type->Urine, Clean Catch  Specimen Source->Urine   CK - Abnormal; Notable for the following components:    CPK 54 (*)     All other components within normal limits   C-REACTIVE PROTEIN - Abnormal; Notable for the following components:    CRP 6.34 (*)     All other components within normal limits   LIPASE   MAGNESIUM   CK   MAGNESIUM   C-REACTIVE PROTEIN   POCT URINE PREGNANCY          Imaging Results              CT Abdomen Pelvis With IV Contrast NO Oral Contrast (Final result)  Result time 03/11/24 13:38:23      Final result by Jovani Bill MD (03/11/24 13:38:23)                   Impression:      Postoperative changes mid sigmoid colon with scattered ascites as well as within the lower abdominal wall thought to be postoperative in nature.  No intra-abdominal abscess identified.    All CT scans at this facility use dose modulation, iterative reconstruction, and/or weight based dosing when appropriate to reduce radiation dose to as low as reasonable achievable.      Electronically signed by: Jovani Bill MD  Date:    03/11/2024  Time:    13:38               Narrative:    EXAMINATION:  CT ABDOMEN PELVIS WITH IV CONTRAST    CLINICAL HISTORY:  Abdominal abscess/infection suspected;Abdominal pain, post-op;    TECHNIQUE:  Low dose axial images, sagittal and coronal reformations were obtained from the lung bases to the pubic symphysis following the IV administration of 100 mL of Omnipaque 350.  Oral contrast was not administered.    COMPARISON:  03/07/2024    FINDINGS:  Heart: Normal size. No effusion.    Lung Bases: Clear.    Liver: Normal size and attenuation. No focal lesions.    Gallbladder: No calcified gallstones.    Bile Ducts: No  dilatation.    Pancreas: No mass. No peripancreatic fat stranding.    Spleen: Normal.    Adrenals: Normal.    Kidneys/Ureters: Normal enhancement.  No mass or  hydroureteronephrosis.    Bladder: No wall thickening.    Reproductive organs: Enlarged myomatous uterus with anterior fundal fibroid measuring up to 3 cm.  Additional cyst or fibroid lower uterine segment measuring 2.5 cm.  Anastomosis seen within the mid sigmoid colon.  No obstruction.    GI Tract/Mesentery: No evidence of bowel obstruction or inflammation.    Postoperative changes seen within the lower abdominal wall with fluid and air tracking within the subcutaneous tissues thought to be related to recent postoperative status.    Peritoneal Space: Scattered ascites seen within the abdomen pelvis likely postoperative.  No definite free air.    Retroperitoneum: No significant adenopathy.    Abdominal wall: Normal.    Vasculature: No aneurysm.    Bones: No acute fracture. No suspicious lytic or sclerotic lesions.                                       Medications   lactated ringers bolus 1,000 mL (0 mLs Intravenous Stopped 3/11/24 1530)   morphine injection 4 mg (4 mg Intravenous Given 3/11/24 1201)   iohexoL (OMNIPAQUE 350) injection 100 mL (100 mLs Intravenous Given 3/11/24 1329)   potassium chloride SA CR tablet 40 mEq (40 mEq Oral Given 3/11/24 1412)     Medical Decision Making  This is an emergent evaluation of 38 y.o. female in the ED presenting for increasing weakness and diffuse abdominal pain. Physical exam reveals a non-toxic, afebrile, and well-appearing female in no apparent respiratory distress. Pertinent physical exam findings above. Vital signs stable. If available, previous records reviewed.    My overall impression is hypokalemia status post colectomy. Differential Diagnosis: including but not limited to acute constipation, early appendicitis, colitis, diverticulitis, volvulus, small bowel obstruction, pancreatitis, mesenteric ischemia,  gastroenteritis, peritonititis, AAA, large bowel obstruction/volvulus, IBS, DKA, hernia, kidney stone, intra-abdominal infection vs abscess, abdominal perforation, nephrolithiasis     Discharge Meds/Instructions: f/u with surgical team. NP scheduled home visit for 3/13. Surg office visit on 3/22    There does not appear to be any indication for further emergent testing, observation, or hospitalization at this time. A mutual shared decision making discussion was had with the patient. Patient appears stable for and is comfortable with discharge home. The diagnosis, treatment plan, instructions for follow-up as well as ED return precautions were discussed. Advised to follow-up with PCP for outpatient follow-up in 2-3 days. Signs and symptoms that would warrant immediate return to ED were reviewed prior to discharge. All questions and concerns were asked, answered, and addressed. Patient expressed understanding and agreement with the plan.     This case was discussed with my attending, Dr. Aleman  who is in agreement with my assessment and plan.    Amount and/or Complexity of Data Reviewed  Labs: ordered. Decision-making details documented in ED Course.  Radiology: ordered. Decision-making details documented in ED Course.    Risk  OTC drugs.  Prescription drug management.  Decision regarding hospitalization.              Attending Attestation:     Physician Attestation Statement for NP/PA:   I personally made/approved the management plan and take responsibility for the patient management.    Other NP/PA Attestation Additions:      Medical Decision Making: Agree with assessment and plan.             ED Course as of 03/11/24 1544   Mon Mar 11, 2024   1127 hCG Qualitative, Urine: Negative [LH]   1228 WBC(!): 17.47  Rising white count when compared to labs from day after surgery. Stress reaction vs infection [LH]   1250 On re-evaluation, patient is still having abdominal symptoms following the morphine.  We will obtain CT  scan with IV contrast for further evaluation.  Had a mutually shared decision making discussion with the pt regarding this. Patient is in agreement with this. [LH]   1253 Potassium(!): 3.1 [LH]   1253 Lipase Result: 44 [LH]   1253 CPK(!): 54 [LH]   1253 Magnesium : 2.1 [LH]   1358 CT Abdomen Pelvis With IV Contrast NO Oral Contrast  Impression:     Postoperative changes mid sigmoid colon with scattered ascites as well as within the lower abdominal wall thought to be postoperative in nature.  No intra-abdominal abscess identified. [LH]   1416 Contacted pt's surgical team via secure chat. Messaged with Ruth Brenner MD about pt. Will add CRP. Pt given IVF. Will PO challenge with potassium and pt should be stable for discharge home.  []   1522 CRP(!): 6.34  Downtrending from previous 2 days ago.  [LH]      ED Course User Index  [LH] Citlaly Carrizales PA-C                           Clinical Impression:  Final diagnoses:  [Z90.49] Status post laparoscopic colectomy  [E87.6] Hypokalemia (Primary)          ED Disposition Condition    Discharge Stable          ED Prescriptions    None       Follow-up Information    None          Citlaly Carrizales PA-C  03/11/24 3589       Yeison Aleman MD  03/11/24 4520

## 2024-03-11 NOTE — TELEPHONE ENCOUNTER
Priya reports weakness, weaker since dc from hospital on 3/9 and bilateral leg cramps & diarrhea. Pt is s/p Colectomy and sigmoidoscopy. Advised pt per triage protocol to go to nearest ED now for physician chrystal. Instructed to call  now if no immediate . V/u.    Reason for Disposition   MODERATE weakness or fatigue from poor fluid intake with no improvement after 2 hours of rest and fluids    Additional Information   Negative: SEVERE difficulty breathing (e.g., struggling for each breath, speaks in single words)   Negative: Shock suspected (e.g., cold/pale/clammy skin, too weak to stand, low BP, rapid pulse)   Negative: Difficult to awaken or acting confused (e.g., disoriented, slurred speech)   Negative: Fainted > 15 minutes ago and still feels too weak or dizzy to stand   Negative: SEVERE weakness (i.e., unable to walk or barely able to walk, requires support) and new-onset or worsening   Negative: Sounds like a life-threatening emergency to the triager   Negative: Difficulty breathing   Negative: Heart beating < 50 beats per minute OR > 140 beats per minute   Negative: Extra heartbeats, irregular heart beating, or heart is beating very fast (i.e., 'palpitations')   Negative: Follows large amount of bleeding (e.g., from vomiting, rectum, vagina) (Exception: Small transient weakness from sight of a small amount blood.)   Negative: Black or tarry bowel movements    Protocols used: Weakness (Generalized) and Fatigue-A-OH

## 2024-03-11 NOTE — PROGRESS NOTES
C3 nurse spoke with Priya Conrad for a TCC post hospital discharge follow up call. The patient has a scheduled [facility] appointment with [facility], Nav Aguilar NP on 03/18/2024 @ 0800.

## 2024-03-11 NOTE — DISCHARGE INSTRUCTIONS
Please return to the Emergency Department for any new or worsening symptoms including: worsening abdominal pain, dark\black\bloody bowel movements, vomiting blood, hard abdomen, fever, chest pain, shortness of breath, loss of consciousness or any other concerns.     Please follow up with your Primary Care Provider within in the week. If you do not have a Primary Care Provider, you may contact the one listed on your discharge paperwork or you may also call the [facility] Appointment Desk at [phone] to schedule an appointment with a Primary Care Provider.

## 2024-03-12 ENCOUNTER — PATIENT MESSAGE (OUTPATIENT)
Dept: SURGERY | Facility: CLINIC | Age: 39
End: 2024-03-12
Payer: COMMERCIAL

## 2024-03-12 ENCOUNTER — TELEPHONE (OUTPATIENT)
Dept: SURGERY | Facility: CLINIC | Age: 39
End: 2024-03-12
Payer: COMMERCIAL

## 2024-03-12 NOTE — TELEPHONE ENCOUNTER
Spoke with patient, reports no fever, no abdominal pain or bloating. States that she feels the urgency but not going at times. Also reports sharp pain that travels down her legs when she sits on the toilet. Patient reports that she has not been eating as much either. Explained that the swelling at the rectum area may be causing the sensation of urgency, combined with the lack of eating/appetite there may not be any content to excrete. Encouraged warm showers to help relieve swelling. Patient will follow up tomorrow. Reviewed urgent symptoms such as abdominal swelling/ bloating and fever to go to the ED. Patient verbalized understanding.

## 2024-03-13 ENCOUNTER — OFFICE VISIT (OUTPATIENT)
Dept: HOME HEALTH SERVICES | Facility: CLINIC | Age: 39
End: 2024-03-13
Payer: COMMERCIAL

## 2024-03-13 VITALS
SYSTOLIC BLOOD PRESSURE: 140 MMHG | OXYGEN SATURATION: 100 % | HEART RATE: 87 BPM | DIASTOLIC BLOOD PRESSURE: 80 MMHG | RESPIRATION RATE: 18 BRPM

## 2024-03-13 DIAGNOSIS — T36.95XA ANTIBIOTIC-INDUCED YEAST INFECTION: ICD-10-CM

## 2024-03-13 DIAGNOSIS — B37.9 ANTIBIOTIC-INDUCED YEAST INFECTION: ICD-10-CM

## 2024-03-13 DIAGNOSIS — Z09 HOSPITAL DISCHARGE FOLLOW-UP: Primary | ICD-10-CM

## 2024-03-13 DIAGNOSIS — K59.01 SLOW TRANSIT CONSTIPATION: ICD-10-CM

## 2024-03-13 DIAGNOSIS — Z90.49 S/P LAPAROSCOPIC COLECTOMY: ICD-10-CM

## 2024-03-13 DIAGNOSIS — I10 ESSENTIAL HYPERTENSION: ICD-10-CM

## 2024-03-13 PROCEDURE — 3079F DIAST BP 80-89 MM HG: CPT | Mod: CPTII,S$GLB,,

## 2024-03-13 PROCEDURE — 1111F DSCHRG MED/CURRENT MED MERGE: CPT | Mod: CPTII,S$GLB,,

## 2024-03-13 PROCEDURE — 1160F RVW MEDS BY RX/DR IN RCRD: CPT | Mod: CPTII,S$GLB,,

## 2024-03-13 PROCEDURE — 99496 TRANSJ CARE MGMT HIGH F2F 7D: CPT | Mod: S$GLB,,,

## 2024-03-13 PROCEDURE — 1159F MED LIST DOCD IN RCRD: CPT | Mod: CPTII,S$GLB,,

## 2024-03-13 PROCEDURE — 3077F SYST BP >= 140 MM HG: CPT | Mod: CPTII,S$GLB,,

## 2024-03-13 RX ORDER — FLUCONAZOLE 150 MG/1
150 TABLET ORAL DAILY
Qty: 1 TABLET | Refills: 1 | Status: SHIPPED | OUTPATIENT
Start: 2024-03-13 | End: 2024-03-15

## 2024-03-13 NOTE — ASSESSMENT & PLAN NOTE
S/p lap total abd colectomy with ileorectal anastomosis on 3/4  Diet improving  Reports diarrhea, small volume  Imodium prn  Lap sites healing well, steri strips in place  F/U with surgery 3/22

## 2024-03-13 NOTE — PROGRESS NOTES
[facility]  Transitional Care Management (TCM) Home Visit    Encounter Provider: Nav Aguilar   PCP: Vinny Rose MD  Consult Requested By: No ref. provider found  Admit Date: 3/4/24   IP Discharge Date: 3/9/24  Hospital Length of Stay:RRHLOS@ days  Days since discharge (from IP or SNF): 4   [facility] Contact Note: 3/11  Hospital Diagnosis: No admission diagnoses are documented for this encounter.     HISTORY OF PRESENT ILLNESS      Patient ID: Priya Conrad is a 38 y.o. female was recently admitted to the hospital, this is their TCM encounter.    Hospital Course Synopsis:  Patient is a 38 y.o. female presents with defecatory dysfunction.   Seen by GI for evaluation for constipation  Taking linzess 290 every morning. Not really helping.  trulance was effective, then stopped working   Tried motegrity - got bad headaches. Not taking anymore  Failed amitiza  Tried pelvic physical therapy with no significant improvement.  Tried intermittent enemas with no significant improvement.  Restarted a regimen of Linzess 290 mcg every day.  This was ineffective.  She then restarted Trulance.  she will not have any bowel movements unless she takes a laxative.  With a laxative, she will have bowel movements once per week.  Without a laxative, she will have bowel movements once per month.  She complains of significant bloating, abdominal distention, pain, and inability to function normally secondary to her discomfort.  Thyroid function normal.     Procedure(s) (LRB):  COLECTOMY, TOTAL, LAPAROSCOPIC,ERAS low (N/A)  SIGMOIDOSCOPY, FLEXIBLE (N/A)      Hospital Course:  Please see the preoperative H&P and other available documentation for full details related to history prior to this admission.  Briefly, Priya Conrad is a 38 y.o. y.o. female who was admitted following scheduled elective laparoscopic total colectomy.      Following a complete preoperative discussion of the risks and benefits of surgery with signed  informed consent, the patient was taken to the operating room on 3/4/24 and underwent the above stated procedures. The patient tolerated surgery well and there were no complications. Please see the operative report for full intraoperative findings and details.       Postoperatively, the patient did well and was transferred from the PACU to the floor in stable condition where they had a stable and uncomplicated hospital course. Patient had an episode of nausea and emesis on 3/6 and was treated with Ngt placement and made NPO. It was removed shortly thereafter, she continued having bowel function, and diet was advanced again.     Labs and vital signs remained stable and appropriate throughout course. Diet was advanced as tolerated and the patient's pain was controlled on oral pain medications without problem. She continued to have bowel function. Currently, the patient is doing well and is stable and appropriate for discharge at this time. Discharge instructions discussed with patient at bedside, all questions and concerns addressed.       Doing ok since discharge. Reports feeling better each day. Appetite slowly improving. Reports diarrhea up to 10 bowel movements per day, small in volume. Take imodium as needed. Reports weakness is improving. Recent ED visit on 3/11 with weakness, diarrhea, and leg cramps. She was found to have hypokalemia. She was treated with Potassium and discharged.  CT was unremarkable. Pain controlled with current medication.  Reports compliance with medications. Denies any further complaints or concerns.   DECISION MAKING TODAY       Assessment & Plan:  1. Hospital discharge follow-up    2. Slow transit constipation  Assessment & Plan:  S/p lap total abd colectomy with ileorectal anastomosis on 3/4  Diet improving  Reports diarrhea, small volume  Imodium prn  Lap sites healing well, steri strips in place  F/U with surgery 3/22      3. S/P laparoscopic colectomy    4. Essential  hypertension  Overview:  Resumed anti hypertensive meds    Assessment & Plan:  Stable  Continue current medications      5. Antibiotic-induced yeast infection  Comments:  reports yeast infection following abx use  Diflucan x 1, repeat in 1 week if needed  Orders:  -     fluconazole (DIFLUCAN) 150 MG Tab; Take 1 tablet (150 mg total) by mouth once daily. for 2 days  Dispense: 1 tablet; Refill: 1         Medication List on Discharge:     Medication List            Accurate as of March 13, 2024  5:03 PM. If you have any questions, ask your nurse or doctor.                START taking these medications      fluconazole 150 MG Tab  Commonly known as: DIFLUCAN  Take 1 tablet (150 mg total) by mouth once daily. for 2 days  Started by: Nav Aguilar NP            CONTINUE taking these medications      ALPRAZolam 0.5 MG tablet  Commonly known as: XANAX  Take 0.5 mg by mouth daily as needed.     amLODIPine 5 MG tablet  Commonly known as: NORVASC  Take 5 mg by mouth every morning.     FLUoxetine 20 MG capsule  Take 20 mg by mouth once daily.     gabapentin 300 MG capsule  Commonly known as: NEURONTIN  Take 600 mg by mouth every evening.     oxyCODONE 5 MG immediate release tablet  Commonly known as: ROXICODONE  Take 1 tablet (5 mg total) by mouth every 6 (six) hours as needed.              Medication Reconciliation:  Were medications changed on discharge? Yes  Were medications in the home? Yes  Is the patient taking the medications as directed? Yes  Does the patient understand the medications and changes? Yes  Does updated med list accurately reflects meds patient is currently taking? Yes    ENVIRONMENT OF CARE      Family and/or Caregiver present at visit?  No  Name of Caregiver: n/a  History provided by: patient    Advance Care Planning   Advanced Care Planning Status:  Patient has had an ACP conversation  Living Will: No  Power of : No  [place]: No    Does Caregiver have HCPoA: No  Changes today: None  Is patient  hospice appropriate: No  (If needed, use PPS <30 or FAST score >7)  Was referral to hospice placed: No       Impression upon entering the home:  Physical Dwelling: single family home   Appearance of home environment: cleaniness: clean, walking pathways: clear, lighting: adequate, and home structure: sound structure  Functional Status: independent  Mobility: ambulatory  Nutritional access: available food but inadequate intake  Home Health: No, and does not need it at this time   DME/Supplies: none     Diagnostic tests reviewed/disposition: I have reviewed all completed as well as pending diagnostic tests at the time of discharge.  Disease/illness education: Take all medication as prescribed. Activity as tolerated. Keep all upcoming appts.   Establishment or re-establishment of referral orders for community resources: No other necessary community resources.   Discussion with other health care providers: No discussion with other health care providers necessary.   Does patient have a PCP at [place]? No   Repatriation plan with PCP? follow-up with PCP within 90d   Does patient have an ostomy (ileostomy, colostomy, suprapubic catheter, nephrostomy tube, tracheostomy, PEG tube, pleurex catheter, cholecystostomy, etc)? No  Were BPAs reviewed? 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[employer]     Social Determinants of Health     Financial Resource Strain: Low Risk  (3/5/2024)    Overall Financial Resource Strain (CARDIA)     Difficulty of Paying Living Expenses: Not hard at all   Food Insecurity: No Food Insecurity (3/5/2024)    Hunger Vital Sign      Worried About Running Out of Food in the Last Year: Never true     Ran Out of Food in the Last Year: Never true   Transportation Needs: No Transportation Needs (3/5/2024)    PRAPARE - Transportation     Lack of Transportation (Medical): No     Lack of Transportation (Non-Medical): No   Physical Activity: Inactive (3/5/2024)    Exercise Vital Sign     Days of Exercise per Week: 0 days     Minutes of Exercise per Session: 0 min   Stress: No Stress Concern Present (3/5/2024)    [employer] [place] [employer] - Occupational Stress Questionnaire     Feeling of Stress : Not at all   Social Connections: Moderately Isolated (3/5/2024)    Social Connection and Isolation Panel [NHANES]     Frequency of Communication with Friends and Family: More than three times a week     Frequency of Social Gatherings with Friends and Family: More than three times a week     Attends Roman Catholic Services: Never     Active Member of Clubs or Organizations: No     Attends Club or Organization Meetings: Never     Marital Status:    Housing Stability: Unknown (3/5/2024)    Housing Stability Vital Sign     Unable to Pay for Housing in the Last Year: No     Unstable Housing in the Last Year: No       OBJECTIVE:     Vital Signs:  Vitals:    03/13/24 1116   BP: (!) 140/80   Pulse: 87   Resp: 18       Review of Systems   Constitutional: Negative.    HENT: Negative.     Eyes: Negative.    Respiratory: Negative.  Negative for chest tightness.    Cardiovascular: Negative.  Negative for leg swelling.   Gastrointestinal:  Positive for diarrhea.   Endocrine: Negative.    Genitourinary: Negative.    Musculoskeletal:  Positive for arthralgias and myalgias.   Skin: Negative.    Allergic/Immunologic: Negative.    Neurological:  Positive for weakness.   Hematological: Negative.    Psychiatric/Behavioral:  Negative for agitation. The patient is nervous/anxious.    All other systems reviewed and are negative.      Physical Exam:  Physical  Exam  Vitals reviewed.   Constitutional:       General: She is not in acute distress.     Appearance: Normal appearance. She is well-developed.   HENT:      Head: Normocephalic and atraumatic.      Nose: Nose normal.      Mouth/Throat:      Pharynx: Oropharynx is clear.   Eyes:      Pupils: Pupils are equal, round, and reactive to light.   Cardiovascular:      Rate and Rhythm: Normal rate and regular rhythm.      Pulses: Normal pulses.      Heart sounds: Normal heart sounds.   Pulmonary:      Effort: Pulmonary effort is normal.      Breath sounds: Normal breath sounds.   Abdominal:      General: Bowel sounds are normal.      Palpations: Abdomen is soft.   Musculoskeletal:         General: Normal range of motion.      Cervical back: Normal range of motion and neck supple.   Skin:     General: Skin is warm and dry.      Comments: Abdominal lap sites with steri strips in place   Neurological:      General: No focal deficit present.      Mental Status: She is alert and oriented to person, place, and time. Mental status is at baseline.   Psychiatric:         Mood and Affect: Mood normal.         Behavior: Behavior normal.         Thought Content: Thought content normal.         Judgment: Judgment normal.         INSTRUCTIONS FOR PATIENT:   - Continue all medications, treatments and therapies as ordered.   - Follow all instructions, recommendations as discussed.  - Maintain Safety Precautions at all times.  - Attend all medical appointments as scheduled.  - For worsening symptoms: call Primary Care Physician or Nurse Practitioner.  - For emergencies, call 911 or immediately report to the nearest emergency room.   Scheduled Follow-up, Appts Reviewed with Modifications if Needed: Yes  Future Appointments   Date Time Provider Department Center   3/22/2024  1:20 PM KOKO Naylor MD [facility]   5/29/2024  3:00 PM Ariane Parker MD [facility]       Signature: Nav Aguilar NP    Transition of Care  Visit:  I have reviewed and updated the history and problem list.  I have reconciled the medication list.  I have discussed the hospitalization and current medical issues, prognosis and plans with the patient/family.

## 2024-03-13 NOTE — PHYSICIAN QUERY
PT Name: Priya Conrad  MR #: [id number]     DOCUMENTATION CLARIFICATION     CDS: Nimo ALVAREZ RN  Contact information: [email or web address]    This form is a permanent document in the medical record.    Query Date: March 13, 2024    By submitting this query, we are merely seeking further clarification of documentation. Please utilize your independent clinical judgment when addressing the question(s) below.    The Medical Record reflects the following:     Indicators   Supporting Clinical Findings Location in Medical Record   X Lab Value(s) Phosphorus= 2.4 Labs 3/5/24   X Treatment/Medication potassium, sodium phosphates 280-160-250 mg packet 2 packet     Mar 3/5/24 1011   X Other - daily labs   - replete electrolytes prn  PN Colorectal Surg 3/5/24     Provider, please specify the diagnosis or diagnoses that correspond(s) to the above indicators. Timothy all that apply:    [  x ] Hypophosphatemia   [   ] Other electrolyte disturbance (please specify): __________   [   ]  Clinically Undetermined       Please document in your progress notes daily for the duration of treatment until resolved, and include in your discharge summary.    Form No. [id number]

## 2024-03-15 ENCOUNTER — PATIENT MESSAGE (OUTPATIENT)
Dept: SURGERY | Facility: CLINIC | Age: 39
End: 2024-03-15
Payer: COMMERCIAL

## 2024-03-21 ENCOUNTER — TELEPHONE (OUTPATIENT)
Dept: SURGERY | Facility: CLINIC | Age: 39
End: 2024-03-21
Payer: COMMERCIAL

## 2024-03-21 NOTE — PROGRESS NOTES
[facility] Office Visit Followup    Referring Md:   No referring provider defined for this encounter.    SUBJECTIVE:     Chief Complaint: defecatory dysfunction    History of Present Illness:  Course is as follows:  Patient is a 38 y.o. female presents with defecatory dysfunction.     Seen by GI for evaluation for constipation  Taking linzess 290 every morning. Not really helping.  trulance was effective, then stopped working   Tried motegrity - got bad headaches. Not taking anymore  Failed amitiza  Tried pelvic physical therapy with no significant improvement.  Tried intermittent enemas with no significant improvement.  Restarted a regimen of Linzess 290 mcg every day.  This was ineffective.  She then restarted Trulance.  she will not have any bowel movements unless she takes a laxative.  With a laxative, she will have bowel movements once per week.  Without a laxative, she will have bowel movements once per month.  She complains of significant bloating, abdominal distention, pain, and inability to function normally secondary to her discomfort.  Thyroid function normal.    Past abdominal surgeries of prior  through a lower midline, abdominal plasty, and recent laparoscopic appendectomy.    No family hx of CRC in 1st degree.   Aunt - CRC    Workup:  Colonoscopy:  10/20/2021: Normal ileum and colon with redundant sigmoid colon  Defecogram 22:   - The anorectal angle measurements fall within range and there is expected increase in the angle on defecation.    - Small anterior rectocele.    - Small amount of residual after evacuation   CT abdomen pelvis 2023 reviewed demonstrates moderate amount of stool throughout the colon.  Small-bowel follow-through 2028: Normal small bowel follow through evaluation  Sitz Marker Study:    Day 1 (2023):  13 markers in the right colon.  Four markers of the hepatic flexure.  Two markers in the transverse colon.  Day 3 (2023):  5 markers at the splenic  flexure.  One marker in the rectum.  Day 5 (07/28/2023):  2 markers in the descending colon.  Four markers in the rectum.    Impression:  Consistent with slow transit constipation.     08/22/2023: Presents for follow-up.  Continuing to have significant issues with constipation.   - Manometry balloon was then inserted and filled to 60 mL of water.  She was then placed on the toilet and asked to expel the balloon.  She expelled the balloon in 2 minutes.  2/2/24:  Presents for evaluation for total abdominal colectomy.  Having to take increasing amounts of laxatives in order to have a bowel movement.  Normally takes 48-72 hours after laxatives to generate a bowel movement.  With laxatives, she is having bowel movements once per week.    3/4/24: laparoscopic total abdominal colectomy  Pathology: benign    3/22/24:  She presents for follow-up.  She is having 1 bowel movement per day.  Pleased with her outcome.  Incisions healing well.  Pain well controlled.      Last Colonoscopy:  10/20/2021:  Impression:            - The examined portion of the ileum was normal.                          - The entire examined colon is normal on direct                          and retroflexion views. Sigmoid colon is redundant.                          - No specimens collected.       Review of Systems:  Review of Systems   Constitutional:  Negative for chills, diaphoresis, fever, malaise/fatigue and weight loss.   HENT:  Negative for congestion.    Respiratory:  Negative for shortness of breath.    Cardiovascular:  Negative for chest pain and leg swelling.   Gastrointestinal:  Positive for abdominal pain and constipation. Negative for blood in stool, nausea and vomiting.   Genitourinary:  Negative for dysuria.   Musculoskeletal:  Negative for back pain and myalgias.   Skin:  Negative for rash.   Neurological:  Negative for dizziness and weakness.   Endo/Heme/Allergies:  Does not bruise/bleed easily.   Psychiatric/Behavioral:  Negative for  "depression.        OBJECTIVE:     Vital Signs (Most Recent)  /82 (BP Location: Right arm, Patient Position: Sitting, BP Method: Large (Automatic))   Pulse 96   Resp 18   Ht 5' 5" (1.651 m)   Wt 81.6 kg (179 lb 14.3 oz)   LMP 03/01/2024 (Exact Date)   BMI 29.94 kg/m²     Physical Exam:  General: Black or  female in no distress   Neuro: alert and oriented x 4.  Moves all extremities.     HEENT: no icterus.  Trachea midline  Respiratory: respirations are even and unlabored  Cardiac: regular rate  Abdomen:  Incisions healing well.  No hernia.  No infection.  Extremities: Warm dry and intact  Skin: no rashes  Anorectal:  Deferred.    Labs:  H&H 13 and 38.  Albumin 4.2.  Normal renal function.  Normal thyroid function.    Imaging:  CT abdomen pelvis 07/09/2023 reviewed demonstrates moderate amount of stool throughout the colon.  Acute appendicitis.      ASSESSMENT/PLAN:     Diagnoses and all orders for this visit:    Slow transit constipation  -     hydrocortisone 2.5 % cream; Apply topically 2 (two) times daily. for 10 days  -     oxyCODONE (ROXICODONE) 5 MG immediate release tablet; Take 1 tablet (5 mg total) by mouth every 4 (four) hours as needed for Pain.              38 y.o. female with significant constipation.  Defecography shows that the rectum was functional to liquids.  Balloon expulsion was performed today in clinic she was able to expel the balloon with 60 mL of water.  Small-bowel follow-through demonstrates normal transit time of her small bowel.  Sitz marker study consistent with slow transit constipation.    She is tried medical therapy including Linzess, Amitiza, Trulance, pelvic physical therapy, enemas, and increasing doses of laxatives.  She therefore underwent laparoscopic total abdominal colectomy in 3/4/24.      Will use steroid to decrease her risk of keloid formation.  Refill of pain medications given.  I will follow up with her with a virtual visit in 4 weeks.      W. " Victor Hugo Naylor MD, FACS, FASCRS  Staff Surgeon  Colon & Rectal Surgery

## 2024-03-22 ENCOUNTER — OFFICE VISIT (OUTPATIENT)
Dept: SURGERY | Facility: CLINIC | Age: 39
End: 2024-03-22
Payer: COMMERCIAL

## 2024-03-22 VITALS
WEIGHT: 179.88 LBS | HEART RATE: 96 BPM | SYSTOLIC BLOOD PRESSURE: 119 MMHG | DIASTOLIC BLOOD PRESSURE: 82 MMHG | HEIGHT: 65 IN | BODY MASS INDEX: 29.97 KG/M2 | RESPIRATION RATE: 18 BRPM

## 2024-03-22 DIAGNOSIS — K59.01 SLOW TRANSIT CONSTIPATION: Primary | ICD-10-CM

## 2024-03-22 PROCEDURE — 3074F SYST BP LT 130 MM HG: CPT | Mod: CPTII,S$GLB,, | Performed by: COLON & RECTAL SURGERY

## 2024-03-22 PROCEDURE — 99999 PR PBB SHADOW E&M-EST. PATIENT-LVL III: CPT | Mod: PBBFAC,,, | Performed by: COLON & RECTAL SURGERY

## 2024-03-22 PROCEDURE — 1160F RVW MEDS BY RX/DR IN RCRD: CPT | Mod: CPTII,S$GLB,, | Performed by: COLON & RECTAL SURGERY

## 2024-03-22 PROCEDURE — 1159F MED LIST DOCD IN RCRD: CPT | Mod: CPTII,S$GLB,, | Performed by: COLON & RECTAL SURGERY

## 2024-03-22 PROCEDURE — 99024 POSTOP FOLLOW-UP VISIT: CPT | Mod: S$GLB,,, | Performed by: COLON & RECTAL SURGERY

## 2024-03-22 PROCEDURE — 3079F DIAST BP 80-89 MM HG: CPT | Mod: CPTII,S$GLB,, | Performed by: COLON & RECTAL SURGERY

## 2024-03-22 RX ORDER — OXYCODONE HYDROCHLORIDE 5 MG/1
5 TABLET ORAL EVERY 4 HOURS PRN
Qty: 28 TABLET | Refills: 0 | Status: SHIPPED | OUTPATIENT
Start: 2024-03-22 | End: 2024-04-25

## 2024-03-22 RX ORDER — HYDROCORTISONE 25 MG/G
CREAM TOPICAL 2 TIMES DAILY
Qty: 28 G | Refills: 5 | Status: SHIPPED | OUTPATIENT
Start: 2024-03-22 | End: 2024-04-01

## 2024-03-27 ENCOUNTER — PATIENT MESSAGE (OUTPATIENT)
Dept: SURGERY | Facility: CLINIC | Age: 39
End: 2024-03-27
Payer: COMMERCIAL

## 2024-04-01 ENCOUNTER — PATIENT MESSAGE (OUTPATIENT)
Dept: PAIN MEDICINE | Facility: CLINIC | Age: 39
End: 2024-04-01
Payer: COMMERCIAL

## 2024-04-01 RX ORDER — TIZANIDINE 4 MG/1
4 TABLET ORAL NIGHTLY PRN
Qty: 30 TABLET | Refills: 1 | Status: SHIPPED | OUTPATIENT
Start: 2024-04-01 | End: 2024-05-31

## 2024-04-08 ENCOUNTER — OFFICE VISIT (OUTPATIENT)
Dept: OTOLARYNGOLOGY | Facility: CLINIC | Age: 39
End: 2024-04-08
Payer: COMMERCIAL

## 2024-04-08 VITALS
DIASTOLIC BLOOD PRESSURE: 81 MMHG | HEART RATE: 70 BPM | BODY MASS INDEX: 29.84 KG/M2 | SYSTOLIC BLOOD PRESSURE: 123 MMHG | WEIGHT: 179.38 LBS

## 2024-04-08 DIAGNOSIS — J30.9 CHRONIC ALLERGIC RHINITIS: Chronic | ICD-10-CM

## 2024-04-08 DIAGNOSIS — J34.3 HYPERTROPHY OF INFERIOR NASAL TURBINATE: Chronic | ICD-10-CM

## 2024-04-08 DIAGNOSIS — R49.0 DYSPHONIA: ICD-10-CM

## 2024-04-08 DIAGNOSIS — R09.A2 GLOBUS SENSATION: Primary | Chronic | ICD-10-CM

## 2024-04-08 DIAGNOSIS — K21.9 LARYNGOPHARYNGEAL REFLUX (LPR): Chronic | ICD-10-CM

## 2024-04-08 PROCEDURE — 99204 OFFICE O/P NEW MOD 45 MIN: CPT | Mod: 25,S$GLB,, | Performed by: OTOLARYNGOLOGY

## 2024-04-08 PROCEDURE — 31575 DIAGNOSTIC LARYNGOSCOPY: CPT | Mod: S$GLB,,, | Performed by: OTOLARYNGOLOGY

## 2024-04-08 PROCEDURE — 1159F MED LIST DOCD IN RCRD: CPT | Mod: CPTII,S$GLB,, | Performed by: OTOLARYNGOLOGY

## 2024-04-08 PROCEDURE — 1111F DSCHRG MED/CURRENT MED MERGE: CPT | Mod: CPTII,S$GLB,, | Performed by: OTOLARYNGOLOGY

## 2024-04-08 PROCEDURE — 3074F SYST BP LT 130 MM HG: CPT | Mod: CPTII,S$GLB,, | Performed by: OTOLARYNGOLOGY

## 2024-04-08 PROCEDURE — 1160F RVW MEDS BY RX/DR IN RCRD: CPT | Mod: CPTII,S$GLB,, | Performed by: OTOLARYNGOLOGY

## 2024-04-08 PROCEDURE — 3008F BODY MASS INDEX DOCD: CPT | Mod: CPTII,S$GLB,, | Performed by: OTOLARYNGOLOGY

## 2024-04-08 PROCEDURE — 3079F DIAST BP 80-89 MM HG: CPT | Mod: CPTII,S$GLB,, | Performed by: OTOLARYNGOLOGY

## 2024-04-08 PROCEDURE — 99999 PR PBB SHADOW E&M-EST. PATIENT-LVL III: CPT | Mod: PBBFAC,,, | Performed by: OTOLARYNGOLOGY

## 2024-04-08 RX ORDER — FLUTICASONE PROPIONATE 50 MCG
2 SPRAY, SUSPENSION (ML) NASAL DAILY
Qty: 9.9 ML | Refills: 11 | Status: SHIPPED | OUTPATIENT
Start: 2024-04-08

## 2024-04-08 RX ORDER — LEVOCETIRIZINE DIHYDROCHLORIDE 5 MG/1
5 TABLET, FILM COATED ORAL NIGHTLY
Qty: 30 TABLET | Refills: 11 | Status: SHIPPED | OUTPATIENT
Start: 2024-04-08 | End: 2025-04-08

## 2024-04-08 RX ORDER — PANTOPRAZOLE SODIUM 40 MG/1
40 TABLET, DELAYED RELEASE ORAL DAILY
Qty: 30 TABLET | Refills: 3 | Status: SHIPPED | OUTPATIENT
Start: 2024-04-08 | End: 2025-04-08

## 2024-04-08 NOTE — PROCEDURES
Laryngoscopy    Date/Time: 4/8/2024 8:00 AM    Performed by: Ada Weldon MD  Authorized by: Ada Weldon MD    Consent Done?:  Yes (Verbal)  Anesthesia:     Local anesthetic:  Lidocaine 2% and Scar-Synephrine 1/2%  Laryngoscopy:     Areas examined:  Nasal cavities, nasopharynx, oropharynx, hypopharynx, larynx and vocal cords  Nose External:      No external nasal deformity  Nose Intranasal:      Mucosa no polyps     Mucosa ulcers not present     No mucosa lesions present     No septum gross deformity     Turbinates not enlarged  Nasopharynx:      No mucosa lesions     Adenoids not present     Posterior choanae patent     Eustachian tube patent  Larynx/hypopharynx:      No epiglottis lesions     No epiglottis edema     No AE folds lesions     No vocal cord polyps     Equal and normal bilateral     No hypopharynx lesions     No piriform sinus pooling     No piriform sinus lesions     Post cricoid edema (moderate)     Post cricoid erythema (moderate)

## 2024-04-08 NOTE — PROGRESS NOTES
Chief Complaint   Patient presents with    Dysphagia     Lump In Middle Of Throat   .     HPI:Priya Conrad is a 38 y.o. female who has been self- referred for a globus sensation in her throat. She notes that it seems to be noticeable on swallowing.  It has been present for about 4 months.  Seems to be getting worse. She notes intermittent history of hoarseness. Her voice is not progressively worsening over this time. There are pitch breaks or cracks. There is not vocal fatigue. She denies food sticking, choking with swallowing, odynophagia, throat pain, and otalgia.  There is no hemoptysis or hematemesis.     She admits to throat clearing.  She admits to history of heartburn and reflux. She has taken pantoprazole in the past.         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[employer]     Social Determinants of Health     Financial Resource Strain: Low Risk  (3/5/2024)    Overall Financial Resource Strain (CARDIA)     Difficulty of Paying Living Expenses: Not hard at all   Food Insecurity: No Food Insecurity (3/5/2024)    Hunger Vital Sign     Worried About Running Out of Food in the Last Year: Never true     Ran Out of Food in the Last Year: Never true   Transportation Needs: No Transportation Needs (3/5/2024)    PRAPARE - Transportation     Lack of Transportation (Medical): No     Lack of Transportation (Non-Medical): No   Physical Activity: Inactive (3/5/2024)    Exercise Vital Sign     Days of Exercise per Week: 0 days     Minutes of  Exercise per Session: 0 min   Stress: No Stress Concern Present (3/5/2024)    [place] citizen [place] of Occupational Health - Occupational Stress Questionnaire     Feeling of Stress : Not at all   Social Connections: Moderately Isolated (3/5/2024)    Social Connection and Isolation Panel [NHANES]     Frequency of Communication with Friends and Family: More than three times a week     Frequency of Social Gatherings with Friends and Family: More than three times a week     Attends Zoroastrian Services: Never     Active Member of Clubs or Organizations: No     Attends Club or Organization Meetings: Never     Marital Status:    Housing Stability: Unknown (3/5/2024)    Housing Stability Vital Sign     Unable to Pay for Housing in the Last Year: No     Unstable Housing in the Last Year: No     Past Surgical History:   Procedure Laterality Date     SECTION      COLONOSCOPY  10/20/2021    COLONOSCOPY N/A 10/20/2021    Procedure: COLONOSCOPY 2 day golytely;  Surgeon: Zheng Woodruff MD;  Location: [facility];  Service: Endoscopy;  Laterality: N/A;    EPIDURAL STEROID INJECTION INTO CERVICAL SPINE N/A 2023    Procedure: C7-T1 LIZANDRO;  Surgeon: Eric Jessica MD;  Location: [facility] PAIN MANAGEMENT;  Service: Pain Management;  Laterality: N/A;  30 mins    EPIDURAL STEROID INJECTION INTO CERVICAL SPINE N/A 2023    Procedure: C7-T1 LIZANDRO;  Surgeon: Jose Metcalf DO;  Location: [facility]IN MANA[facility]EMENT;  Service: Pain Management;  Laterality: N/A;  15 mins    ESOPHAGOGASTRODUODENOSCOPY N/A 10/20/2021    Procedure: EGD (ESOPHAGOGASTRODUODENOSCOPY);  Surgeon: Zheng Woodruff MD;  Location: [facility];  Service: Endoscopy;  Laterality: N/A;    FLEXIBLE SIGMOIDOSCOPY N/A 3/4/2024    Procedure: SIGMOIDOSCOPY, FLEXIBLE;  Surgeon: KOKO Naylor MD;  Location: [facility] OR [address] [place] [address];  Service: Colon and Rectal;  Laterality: N/A;    LAPAROSCOPIC APPENDECTOMY N/A 7/10/2023    Procedure: APPENDECTOMY, LAPAROSCOPIC;   Surgeon: Ki De La Fuente MD;  Location: [facility] OR;  Service: General;  Laterality: N/A;    LAPAROSCOPIC TOTAL COLECTOMY N/A 3/4/2024    Procedure: COLECTOMY, TOTAL, LAPAROSCOPIC,ERAS low;  Surgeon: KOKO Naylor MD;  Location: [facility] OR [address] [place] [address];  Service: Colon and Rectal;  Laterality: N/A;    TUBAL LIGATION      Tummy Tuck      UPPER GASTROINTESTINAL ENDOSCOPY  10/20/2021     Family History   Problem Relation Age of Onset    Hypertension Mother     Breast cancer Paternal Aunt     Heart failure Maternal Grandmother     Heart failure Maternal Grandfather            Review of Systems  General: negative for chills, fever or weight loss  Psychological: negative for mood changes or depression  Ophthalmic: negative for blurry vision, photophobia or eye pain  ENT: see HPI  Respiratory: no cough, shortness of breath, or wheezing  Cardiovascular: no chest pain or dyspnea on exertion  Gastrointestinal: no abdominal pain, change in bowel habits, or black/ bloody stools  Musculoskeletal: negative for gait disturbance or muscular weakness  Neurological: no syncope or seizures; no ataxia  Dermatological: negative for puritis,  rash and jaundice  Hematologic/lymphatic: no easy bruising, no new lumps or bumps      Physical Exam:    Vitals:    04/08/24 0801   BP: 123/81   Pulse: 70       Constitutional: Well appearing / communicating without difficutly.  NAD.  Eyes: EOM I Bilaterally  Head/Face: Normocephalic.  Negative paranasal sinus pressure/tenderness.  Salivary glands WNL.  House Brackmann I Bilaterally.    Right Ear: Auricle normal appearance. External Auditory Canal within normal limits no lesions or masses,TM w/o masses/lesions/perforations. TM mobility noted.   Left Ear: Auricle normal appearance. External Auditory Canal within normal limits no lesions or masses,TM w/o masses/lesions/perforations. TM mobility noted.  Nose: + mucosal edema. No gross nasal septal deviation. Inferior Turbinates 3+ bilaterally. No septal  perforation. No masses/lesions. External nasal skin appears normal without masses/lesions.  Oral Cavity: Gingiva/lips within normal limits.  Dentition/gingiva healthy appearing. Mucus membranes moist. Floor of mouth soft, no masses palpated. Oral Tongue mobile. Hard Palate appears normal.    Oropharynx: Base of tongue appears normal. No masses/lesions noted. Tonsillar fossa/pharyngeal wall without lesions. Posterior oropharynx WNL.  Soft palate without masses. Midline uvula.   Neck/Lymphatic: No LAD I-VI bilaterally.  No thyromegaly.  No masses noted on exam.    Mirror laryngoscopy/nasopharyngoscopy: Active gag reflex.  Unable to perform.    See separate procedure note for FFL.    Diagnostic studies reviewed: none      Assessment:    ICD-10-CM ICD-9-CM    1. Globus sensation  R09.A2 784.99       2. Laryngopharyngeal reflux (LPR)  K21.9 478.79       3. Chronic allergic rhinitis  J30.9 477.9       4. Hypertrophy of inferior nasal turbinate  J34.3 478.0         The primary encounter diagnosis was Globus sensation. Diagnoses of Laryngopharyngeal reflux (LPR), Chronic allergic rhinitis, and Hypertrophy of inferior nasal turbinate were also pertinent to this visit.      Plan:  No orders of the defined types were placed in this encounter.    I recommended that the patient start taking  Pantoprazole 40 mg PO daily* and provided a prescription. I also recommended that the patient refrain from eating within 3 hours of going to bed, to elevate the head of bed very subtly and optimize the impact of gravity on the potential reflux, and to avoid alcohol, caffeine, tobacco, tomato sauce, spicy foods, fried food, and chocolate.     Follow up in 8 weeks  to reassess progress with treatment regimen.     Ada Medina MD

## 2024-04-11 ENCOUNTER — PATIENT MESSAGE (OUTPATIENT)
Dept: SURGERY | Facility: CLINIC | Age: 39
End: 2024-04-11
Payer: COMMERCIAL

## 2024-04-11 RX ORDER — HYDROCORTISONE 25 MG/G
CREAM TOPICAL 2 TIMES DAILY
Qty: 28 G | Refills: 5 | Status: SHIPPED | OUTPATIENT
Start: 2024-04-11 | End: 2024-06-13

## 2024-04-17 ENCOUNTER — HOSPITAL ENCOUNTER (OUTPATIENT)
Dept: RADIOLOGY | Facility: HOSPITAL | Age: 39
Discharge: HOME | End: 2024-04-17
Attending: OBSTETRICS & GYNECOLOGY
Payer: COMMERCIAL

## 2024-04-17 ENCOUNTER — OFFICE VISIT (OUTPATIENT)
Dept: OBSTETRICS AND GYNECOLOGY | Facility: CLINIC | Age: 39
End: 2024-04-17
Payer: COMMERCIAL

## 2024-04-17 VITALS — DIASTOLIC BLOOD PRESSURE: 86 MMHG | WEIGHT: 178.44 LBS | BODY MASS INDEX: 29.7 KG/M2 | SYSTOLIC BLOOD PRESSURE: 119 MMHG

## 2024-04-17 DIAGNOSIS — Z12.4 PAP SMEAR FOR CERVICAL CANCER SCREENING: ICD-10-CM

## 2024-04-17 DIAGNOSIS — Z12.31 SCREENING MAMMOGRAM FOR BREAST CANCER: ICD-10-CM

## 2024-04-17 DIAGNOSIS — Z11.3 SCREENING EXAMINATION FOR STD (SEXUALLY TRANSMITTED DISEASE): ICD-10-CM

## 2024-04-17 DIAGNOSIS — Z01.419 ENCOUNTER FOR ANNUAL ROUTINE GYNECOLOGICAL EXAMINATION: Primary | ICD-10-CM

## 2024-04-17 DIAGNOSIS — Z72.89 OTHER PROBLEMS RELATED TO LIFESTYLE: ICD-10-CM

## 2024-04-17 PROCEDURE — 3008F BODY MASS INDEX DOCD: CPT | Mod: CPTII,S$GLB,, | Performed by: OBSTETRICS & GYNECOLOGY

## 2024-04-17 PROCEDURE — 77063 BREAST TOMOSYNTHESIS BI: CPT | Mod: 26,,, | Performed by: RADIOLOGY

## 2024-04-17 PROCEDURE — 99999 PR PBB SHADOW E&M-EST. PATIENT-LVL III: CPT | Mod: PBBFAC,,, | Performed by: OBSTETRICS & GYNECOLOGY

## 2024-04-17 PROCEDURE — 77067 SCR MAMMO BI INCL CAD: CPT | Mod: TC

## 2024-04-17 PROCEDURE — 88175 CYTOPATH C/V AUTO FLUID REDO: CPT | Performed by: OBSTETRICS & GYNECOLOGY

## 2024-04-17 PROCEDURE — 81514 NFCT DS BV&VAGINITIS DNA ALG: CPT | Performed by: OBSTETRICS & GYNECOLOGY

## 2024-04-17 PROCEDURE — 3074F SYST BP LT 130 MM HG: CPT | Mod: CPTII,S$GLB,, | Performed by: OBSTETRICS & GYNECOLOGY

## 2024-04-17 PROCEDURE — 3079F DIAST BP 80-89 MM HG: CPT | Mod: CPTII,S$GLB,, | Performed by: OBSTETRICS & GYNECOLOGY

## 2024-04-17 PROCEDURE — 77067 SCR MAMMO BI INCL CAD: CPT | Mod: 26,,, | Performed by: RADIOLOGY

## 2024-04-17 PROCEDURE — 99396 PREV VISIT EST AGE 40-64: CPT | Mod: S$GLB,,, | Performed by: OBSTETRICS & GYNECOLOGY

## 2024-04-17 PROCEDURE — 1159F MED LIST DOCD IN RCRD: CPT | Mod: CPTII,S$GLB,, | Performed by: OBSTETRICS & GYNECOLOGY

## 2024-04-17 PROCEDURE — 87591 N.GONORRHOEAE DNA AMP PROB: CPT | Performed by: OBSTETRICS & GYNECOLOGY

## 2024-04-17 RX ORDER — TRANEXAMIC ACID 650 MG/1
1300 TABLET ORAL 3 TIMES DAILY PRN
Qty: 30 TABLET | Refills: 2 | Status: SHIPPED | OUTPATIENT
Start: 2024-04-17 | End: 2024-04-22

## 2024-04-17 NOTE — PROGRESS NOTES
Chief Complaint   Patient presents with    Follow-up       HISTORY OF PRESENT ILLNESS:   Priya Conrad is a 38 y.o. female with h/o 3 c-sections, abdominoplasty and appendectomy and colectomy who presents for well woman exam.  Patient's last menstrual period was 2024..  She has complains of still having heavy cycles, they are monthly but she will change 7 pads in a day for 1st 2 days. Is painful. Had colectomy last month so still healing from that and cycle was delayed until now.      Past Medical History:   Diagnosis Date    Anxiety     Heart murmur     Hypertension     Migraine        Past Surgical History:   Procedure Laterality Date     SECTION      COLONOSCOPY  10/20/2021    COLONOSCOPY N/A 10/20/2021    Procedure: COLONOSCOPY 2 day golytely;  Surgeon: Zheng Woodruff MD;  Location: [facility];  Service: Endoscopy;  Laterality: N/A;    EPIDURAL STEROID INJECTION INTO CERVICAL SPINE N/A 2023    Procedure: C7-T1 LIZANDRO;  Surgeon: Eric Jessica MD;  Location: [facility] PAIN MANAGEMENT;  Service: Pain Management;  Laterality: N/A;  30 mins    EPIDURAL STEROID INJECTION INTO CERVICAL SPINE N/A 2023    Procedure: C7-T1 LIZANDRO;  Surgeon: Jose Metcalf DO;  Location: [facility] PAIN MANAGEMENT;  Service: Pain Management;  Laterality: N/A;  15 mins    ESOPHAGOGASTRODUODENOSCOPY N/A 10/20/2021    Procedure: EGD (ESOPHAGOGASTRODUODENOSCOPY);  Surgeon: Zheng Woodruff MD;  Location: [facility];  Service: Endoscopy;  Laterality: N/A;    FLEXIBLE SIGMOIDOSCOPY N/A 3/4/2024    Procedure: SIGMOIDOSCOPY, FLEXIBLE;  Surgeon: KOKO Naylor MD;  Location: [address];  Service: Colon and Rectal;  Laterality: N/A;    LAPAROSCOPIC APPENDECTOMY N/A 7/10/2023    Procedure: APPENDECTOMY, LAPAROSCOPIC;  Surgeon: Ki De La Fuente MD;  Location: [facility];  Service: General;  Laterality: N/A;    LAPAROSCOPIC TOTAL COLECTOMY N/A 3/4/2024    Procedure: COLECTOMY, TOTAL, LAPAROSCOPIC,ERAS low;  Surgeon:  KOKO Naylor MD;  Location: [facility] OR [address] [place] [address];  Service: Colon and Rectal;  Laterality: N/A;    TUBAL LIGATION      Tummy Tuck      UPPER GASTROINTESTINAL ENDOSCOPY  10/20/2021       Social History     Socioeconomic History    Marital status:     Number of children: 3   Occupational History    Occupation: medical assistant     Comment: [employer]   Tobacco Use    Smoking status: Never    Smokeless tobacco: Never   Substance and Sexual Activity    Alcohol use: Not Currently     Comment: socially    Drug use: No   Social History Narrative    , 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[place]     Social Determinants of Health     Financial Resource Strain: Low Risk  (3/5/2024)    Overall Financial Resource Strain (CARDIA)     Difficulty of Paying Living Expenses: Not hard at all   Food Insecurity: No Food Insecurity (3/5/2024)    Hunger Vital Sign     Worried About Running Out of Food in the Last Year: Never true     Ran Out of Food in the Last Year: Never true   Transportation Needs: No Transportation Needs (3/5/2024)    PRAPARE - Transportation     Lack of Transportation (Medical): No     Lack of Transportation (Non-Medical): No   Physical Activity: Inactive (3/5/2024)    Exercise Vital Sign     Days of Exercise per Week: 0 days     Minutes of Exercise per Session: 0 min   Stress: No Stress Concern Present (3/5/2024)    [place] of Occupational Health - Occupational Stress Questionnaire     Feeling of Stress : Not at all   Social Connections: Moderately Isolated (3/5/2024)    Social Connection and Isolation Panel [NHANES]     Frequency of Communication with Friends and Family: More than three times a week     Frequency of Social Gatherings with Friends and Family: More than three times a week     Attends Jehovah's witness Services: Never     Active Member of Clubs or Organizations: No     Attends Club or Organization Meetings: Never     Marital Status:    Housing Stability: Unknown  (3/5/2024)    Housing Stability Vital Sign     Unable to Pay for Housing in the Last Year: No     Unstable Housing in the Last Year: No       Family History   Problem Relation Name Age of Onset    Hypertension Mother      Breast cancer Paternal Aunt      Heart failure Maternal Grandmother      Heart failure Maternal Grandfather         OB History    Para Term  AB Living   3 3 3     3   SAB IAB Ectopic Multiple Live Births                  # Outcome Date GA Lbr Tacos/2nd Weight Sex Type Anes PTL Lv   3 Term            2 Term            1 Term                COMPREHENSIVE GYN HISTORY:  PAP History: had abnormal Pap in  with leep, normal  NILM/HPV-;  NILm/hPV-  Infection History: Denies STDs. Denies PID.  Benign History: has uterine fibroids. Denies ovarian cysts. Denies endometriosis Denies other conditions.  Cancer History: Denies cervical cancer. Denies uterine cancer or hyperplasia. Denies ovarian cancer. Denies vulvar cancer or pre-cancer. Denies vaginal cancer or pre-cancer. Denies breast cancer. Denies colon cancer.  Cycle: /, heavy and painful   Had BTL   2 relatives on dad side with breast cancer, aunt, postmenopausal, then a cousin in 40s   Did patches for AUB     ROS:  Otherwise negative       /86   Wt 81 kg (178 lb 7.4 oz)   LMP 2024   BMI 29.70 kg/m²     APPEARANCE: Well nourished, well developed, in no acute distress.  NECK: Neck symmetric   ABDOMEN: Soft. No tenderness or masses. No hernias. No hepatosplenomegaly.  BREASTS: Symmetrical, no skin changes or visible lesions. No palpable masses, nipple discharge or adenopathy bilaterally.  PELVIC:   VULVA: No lesions. Normal female genitalia.  URETHRAL MEATUS: Normal size and location, no lesions, no prolapse.  URETHRA: No masses, tenderness, prolapse or scarring.  VAGINA: Moist and well rugated,on cycle,   CERVIX: No lesions and discharge.  UTERUS: Normal size, regular shape, mobile, non-tender, bladder base  nontender.  ADNEXA: No masses or tenderness.        1. Encounter for annual routine gynecological examination    2. Pap smear for cervical cancer screening          Plan:  Routine gyn s/p normal breast exam. Pap not indicated until 2027. STD testing: GC/CT/trich, and affrim done for discharge.   2. Discussed treatment for fibroids and can't do estrogen with her HTN being uncontrolled, discussed progesterone only choices, oriohnn and myfembree and lysteda. Would recommend exhausting all choices before surgery since has had several abdominal surgeries. She would like to try lysteda.

## 2024-04-18 NOTE — PROGRESS NOTES
CRS Office Visit Followup    Referring Md:   No referring provider defined for this encounter.    SUBJECTIVE:     Chief Complaint: defecatory dysfunction    History of Present Illness:  Course is as follows:  Patient is a 38 y.o. female presents with defecatory dysfunction.     Seen by GI for evaluation for constipation  Taking linzess 290 every morning. Not really helping.  trulance was effective, then stopped working   Tried motegrity - got bad headaches. Not taking anymore  Failed amitiza  Tried pelvic physical therapy with no significant improvement.  Tried intermittent enemas with no significant improvement.  Restarted a regimen of Linzess 290 mcg every day.  This was ineffective.  She then restarted Trulance.  she will not have any bowel movements unless she takes a laxative.  With a laxative, she will have bowel movements once per week.  Without a laxative, she will have bowel movements once per month.  She complains of significant bloating, abdominal distention, pain, and inability to function normally secondary to her discomfort.  Thyroid function normal.    Past abdominal surgeries of prior  through a lower midline, abdominal plasty, and recent laparoscopic appendectomy.    No family hx of CRC in 1st degree.   Aunt - CRC    Workup:  Colonoscopy:  10/20/2021: Normal ileum and colon with redundant sigmoid colon  Defecogram 22:   - The anorectal angle measurements fall within range and there is expected increase in the angle on defecation.    - Small anterior rectocele.    - Small amount of residual after evacuation   CT abdomen pelvis 2023 reviewed demonstrates moderate amount of stool throughout the colon.  Small-bowel follow-through 2028: Normal small bowel follow through evaluation  Sitz Marker Study:    Day 1 (2023):  13 markers in the right colon.  Four markers of the hepatic flexure.  Two markers in the transverse colon.  Day 3 (2023):  5 markers at the splenic  flexure.  One marker in the rectum.  Day 5 (07/28/2023):  2 markers in the descending colon.  Four markers in the rectum.    Impression:  Consistent with slow transit constipation.     08/22/2023: Presents for follow-up.  Continuing to have significant issues with constipation.   - Manometry balloon was then inserted and filled to 60 mL of water.  She was then placed on the toilet and asked to expel the balloon.  She expelled the balloon in 2 minutes.  2/2/24:  Presents for evaluation for total abdominal colectomy.  Having to take increasing amounts of laxatives in order to have a bowel movement.  Normally takes 48-72 hours after laxatives to generate a bowel movement.  With laxatives, she is having bowel movements once per week.    3/4/24: laparoscopic total abdominal colectomy  Pathology: benign    3/22/24:  She presents for follow-up.  She is having 1 bowel movement per day.  Pleased with her outcome.  Incisions healing well.  Pain well controlled.    4/19/24:  Doing very well.  Having bowel movements 2-3 times per day.  No incontinence.  Feels well.  No issues with her incision.    Last Colonoscopy:  10/20/2021:  Impression:            - The examined portion of the ileum was normal.                          - The entire examined colon is normal on direct                          and retroflexion views. Sigmoid colon is redundant.                          - No specimens collected.       Review of Systems:  Review of Systems   Constitutional:  Negative for chills, diaphoresis, fever, malaise/fatigue and weight loss.   HENT:  Negative for congestion.    Respiratory:  Negative for shortness of breath.    Cardiovascular:  Negative for chest pain and leg swelling.   Gastrointestinal:  Positive for abdominal pain and constipation. Negative for blood in stool, nausea and vomiting.   Genitourinary:  Negative for dysuria.   Musculoskeletal:  Negative for back pain and myalgias.   Skin:  Negative for rash.   Neurological:   Negative for dizziness and weakness.   Endo/Heme/Allergies:  Does not bruise/bleed easily.   Psychiatric/Behavioral:  Negative for depression.        OBJECTIVE:     Vital Signs (Most Recent)  LMP 04/14/2024     Physical Exam:  General: Black or  female in no distress   Neuro: alert and oriented x 4.  Moves all extremities.     HEENT: no icterus.  Trachea midline  Respiratory: respirations are even and unlabored  Cardiac: regular rate  Abdomen:  Incisions healing well.  No hernia.  No infection.  Extremities: Warm dry and intact  Skin: no rashes  Anorectal:  Deferred.    Labs:  H&H 13 and 38.  Albumin 4.2.  Normal renal function.  Normal thyroid function.    Imaging:  CT abdomen pelvis 07/09/2023 reviewed demonstrates moderate amount of stool throughout the colon.  Acute appendicitis.      ASSESSMENT/PLAN:     Diagnoses and all orders for this visit:    Slow transit constipation                38 y.o. female with significant constipation.  Defecography shows that the rectum was functional to liquids.  Balloon expulsion was performed today in clinic she was able to expel the balloon with 60 mL of water.  Small-bowel follow-through demonstrates normal transit time of her small bowel.  Sitz marker study consistent with slow transit constipation.    She is tried medical therapy including Linzess, Amitiza, Trulance, pelvic physical therapy, enemas, and increasing doses of laxatives.  She therefore underwent laparoscopic total abdominal colectomy in 3/4/24.      She overall is doing very well.  Okay to return to all regular activities.  Discussed that at 43, she would need colonoscopy but may not need to take the entire prep due to risk of dehydration with a full prep.      KOKO Naylor MD, FACS, FASCRS  Staff Surgeon  Colon & Rectal Surgery

## 2024-04-19 ENCOUNTER — OFFICE VISIT (OUTPATIENT)
Dept: SURGERY | Facility: CLINIC | Age: 39
End: 2024-04-19
Payer: COMMERCIAL

## 2024-04-19 DIAGNOSIS — K59.01 SLOW TRANSIT CONSTIPATION: Primary | ICD-10-CM

## 2024-04-19 PROCEDURE — 99024 POSTOP FOLLOW-UP VISIT: CPT | Mod: 95,,, | Performed by: COLON & RECTAL SURGERY

## 2024-04-19 PROCEDURE — 1159F MED LIST DOCD IN RCRD: CPT | Mod: CPTII,95,, | Performed by: COLON & RECTAL SURGERY

## 2024-04-19 PROCEDURE — 1160F RVW MEDS BY RX/DR IN RCRD: CPT | Mod: CPTII,95,, | Performed by: COLON & RECTAL SURGERY

## 2024-04-20 LAB
BACTERIAL VAGINOSIS DNA: POSITIVE
C TRACH DNA SPEC QL NAA+PROBE: NOT DETECTED
CANDIDA GLABRATA DNA: NEGATIVE
CANDIDA KRUSEI DNA: NEGATIVE
CANDIDA RRNA VAG QL PROBE: POSITIVE
N GONORRHOEA DNA SPEC QL NAA+PROBE: NOT DETECTED
T VAGINALIS RRNA GENITAL QL PROBE: NEGATIVE

## 2024-04-22 ENCOUNTER — PATIENT MESSAGE (OUTPATIENT)
Dept: OBSTETRICS AND GYNECOLOGY | Facility: CLINIC | Age: 39
End: 2024-04-22
Payer: COMMERCIAL

## 2024-04-22 DIAGNOSIS — R92.8 ABNORMAL MAMMOGRAM OF LEFT BREAST: Primary | ICD-10-CM

## 2024-04-22 RX ORDER — METRONIDAZOLE 500 MG/1
500 TABLET ORAL EVERY 12 HOURS
Qty: 14 TABLET | Refills: 0 | Status: SHIPPED | OUTPATIENT
Start: 2024-04-22 | End: 2024-04-29

## 2024-04-22 RX ORDER — FLUCONAZOLE 150 MG/1
150 TABLET ORAL
Qty: 2 TABLET | Refills: 0 | Status: SHIPPED | OUTPATIENT
Start: 2024-04-22 | End: 2024-04-26

## 2024-04-24 ENCOUNTER — PATIENT MESSAGE (OUTPATIENT)
Dept: OBSTETRICS AND GYNECOLOGY | Facility: CLINIC | Age: 39
End: 2024-04-24
Payer: COMMERCIAL

## 2024-04-25 DIAGNOSIS — I10 ESSENTIAL HYPERTENSION: Primary | ICD-10-CM

## 2024-04-25 LAB
FINAL PATHOLOGIC DIAGNOSIS: NORMAL
Lab: NORMAL

## 2024-04-26 ENCOUNTER — PATIENT MESSAGE (OUTPATIENT)
Dept: OBSTETRICS AND GYNECOLOGY | Facility: CLINIC | Age: 39
End: 2024-04-26
Payer: COMMERCIAL

## 2024-04-26 RX ORDER — MEDROXYPROGESTERONE ACETATE 10 MG/1
10 TABLET ORAL 2 TIMES DAILY
Qty: 14 TABLET | Refills: 0 | Status: SHIPPED | OUTPATIENT
Start: 2024-04-26 | End: 2024-06-13

## 2024-04-30 ENCOUNTER — HOSPITAL ENCOUNTER (OUTPATIENT)
Dept: RADIOLOGY | Facility: HOSPITAL | Age: 39
Discharge: HOME | End: 2024-04-30
Attending: OBSTETRICS & GYNECOLOGY
Payer: COMMERCIAL

## 2024-04-30 DIAGNOSIS — R92.8 ABNORMAL MAMMOGRAM OF LEFT BREAST: ICD-10-CM

## 2024-04-30 PROCEDURE — 77065 DX MAMMO INCL CAD UNI: CPT | Mod: TC,LT

## 2024-04-30 PROCEDURE — 76642 ULTRASOUND BREAST LIMITED: CPT | Mod: TC,LT

## 2024-04-30 PROCEDURE — 77061 BREAST TOMOSYNTHESIS UNI: CPT | Mod: TC,LT

## 2024-04-30 PROCEDURE — 77065 DX MAMMO INCL CAD UNI: CPT | Mod: 26,LT,, | Performed by: RADIOLOGY

## 2024-04-30 PROCEDURE — 76642 ULTRASOUND BREAST LIMITED: CPT | Mod: 26,LT,, | Performed by: RADIOLOGY

## 2024-04-30 PROCEDURE — 77061 BREAST TOMOSYNTHESIS UNI: CPT | Mod: 26,LT,, | Performed by: RADIOLOGY

## 2024-05-01 ENCOUNTER — PATIENT MESSAGE (OUTPATIENT)
Dept: OBSTETRICS AND GYNECOLOGY | Facility: CLINIC | Age: 39
End: 2024-05-01
Payer: COMMERCIAL

## 2024-05-03 ENCOUNTER — TELEPHONE (OUTPATIENT)
Dept: PAIN MEDICINE | Facility: CLINIC | Age: 39
End: 2024-05-03
Payer: COMMERCIAL

## 2024-05-03 ENCOUNTER — LAB VISIT (OUTPATIENT)
Dept: LAB | Facility: HOSPITAL | Age: 39
End: 2024-05-03
Attending: OBSTETRICS & GYNECOLOGY
Payer: COMMERCIAL

## 2024-05-03 ENCOUNTER — OFFICE VISIT (OUTPATIENT)
Dept: OBSTETRICS AND GYNECOLOGY | Facility: CLINIC | Age: 39
End: 2024-05-03
Payer: COMMERCIAL

## 2024-05-03 ENCOUNTER — OFFICE VISIT (OUTPATIENT)
Dept: PAIN MEDICINE | Facility: CLINIC | Age: 39
End: 2024-05-03
Payer: COMMERCIAL

## 2024-05-03 VITALS
DIASTOLIC BLOOD PRESSURE: 79 MMHG | WEIGHT: 172.81 LBS | BODY MASS INDEX: 28.76 KG/M2 | SYSTOLIC BLOOD PRESSURE: 122 MMHG

## 2024-05-03 VITALS — HEART RATE: 82 BPM | SYSTOLIC BLOOD PRESSURE: 123 MMHG | DIASTOLIC BLOOD PRESSURE: 77 MMHG

## 2024-05-03 DIAGNOSIS — N93.9 ABNORMAL UTERINE BLEEDING (AUB): Primary | ICD-10-CM

## 2024-05-03 DIAGNOSIS — N64.4 BREAST PAIN: ICD-10-CM

## 2024-05-03 DIAGNOSIS — G89.4 CHRONIC PAIN SYNDROME: ICD-10-CM

## 2024-05-03 DIAGNOSIS — D21.9 FIBROIDS: ICD-10-CM

## 2024-05-03 DIAGNOSIS — M50.30 DDD (DEGENERATIVE DISC DISEASE), CERVICAL: ICD-10-CM

## 2024-05-03 DIAGNOSIS — N93.9 ABNORMAL UTERINE BLEEDING (AUB): ICD-10-CM

## 2024-05-03 DIAGNOSIS — M54.12 CERVICAL RADICULOPATHY: Primary | ICD-10-CM

## 2024-05-03 DIAGNOSIS — N60.42 MAMMARY DUCT ECTASIA OF LEFT BREAST: ICD-10-CM

## 2024-05-03 LAB
BASOPHILS # BLD AUTO: 0.03 K/UL (ref 0–0.2)
BASOPHILS NFR BLD: 0.4 % (ref 0–1.9)
DIFFERENTIAL METHOD BLD: ABNORMAL
EOSINOPHIL # BLD AUTO: 0 K/UL (ref 0–0.5)
EOSINOPHIL NFR BLD: 0.4 % (ref 0–8)
ERYTHROCYTE [DISTWIDTH] IN BLOOD BY AUTOMATED COUNT: 14.6 % (ref 11.5–14.5)
HCT VFR BLD AUTO: 42.4 % (ref 37–48.5)
HGB BLD-MCNC: 13.6 G/DL (ref 12–16)
IMM GRANULOCYTES # BLD AUTO: 0.02 K/UL (ref 0–0.04)
IMM GRANULOCYTES NFR BLD AUTO: 0.3 % (ref 0–0.5)
IRON SERPL-MCNC: 76 UG/DL (ref 30–160)
LYMPHOCYTES # BLD AUTO: 1.5 K/UL (ref 1–4.8)
LYMPHOCYTES NFR BLD: 21.4 % (ref 18–48)
MCH RBC QN AUTO: 28.6 PG (ref 27–31)
MCHC RBC AUTO-ENTMCNC: 32.1 G/DL (ref 32–36)
MCV RBC AUTO: 89 FL (ref 82–98)
MONOCYTES # BLD AUTO: 0.7 K/UL (ref 0.3–1)
MONOCYTES NFR BLD: 9.4 % (ref 4–15)
NEUTROPHILS # BLD AUTO: 4.9 K/UL (ref 1.8–7.7)
NEUTROPHILS NFR BLD: 68.1 % (ref 38–73)
NRBC BLD-RTO: 0 /100 WBC
PLATELET # BLD AUTO: 293 K/UL (ref 150–450)
PMV BLD AUTO: 10.4 FL (ref 9.2–12.9)
RBC # BLD AUTO: 4.75 M/UL (ref 4–5.4)
SATURATED IRON: 18 % (ref 20–50)
TOTAL IRON BINDING CAPACITY: 426 UG/DL (ref 250–450)
TRANSFERRIN SERPL-MCNC: 288 MG/DL (ref 200–375)
TSH SERPL DL<=0.005 MIU/L-ACNC: 0.72 UIU/ML (ref 0.4–4)
WBC # BLD AUTO: 7.14 K/UL (ref 3.9–12.7)

## 2024-05-03 PROCEDURE — 1159F MED LIST DOCD IN RCRD: CPT | Mod: CPTII,S$GLB,, | Performed by: NURSE PRACTITIONER

## 2024-05-03 PROCEDURE — 3074F SYST BP LT 130 MM HG: CPT | Mod: CPTII,S$GLB,, | Performed by: OBSTETRICS & GYNECOLOGY

## 2024-05-03 PROCEDURE — 3078F DIAST BP <80 MM HG: CPT | Mod: CPTII,S$GLB,, | Performed by: NURSE PRACTITIONER

## 2024-05-03 PROCEDURE — 99213 OFFICE O/P EST LOW 20 MIN: CPT | Mod: S$GLB,,, | Performed by: OBSTETRICS & GYNECOLOGY

## 2024-05-03 PROCEDURE — 83540 ASSAY OF IRON: CPT | Performed by: OBSTETRICS & GYNECOLOGY

## 2024-05-03 PROCEDURE — 1159F MED LIST DOCD IN RCRD: CPT | Mod: CPTII,S$GLB,, | Performed by: OBSTETRICS & GYNECOLOGY

## 2024-05-03 PROCEDURE — 3008F BODY MASS INDEX DOCD: CPT | Mod: CPTII,S$GLB,, | Performed by: OBSTETRICS & GYNECOLOGY

## 2024-05-03 PROCEDURE — 36415 COLL VENOUS BLD VENIPUNCTURE: CPT | Performed by: OBSTETRICS & GYNECOLOGY

## 2024-05-03 PROCEDURE — 99999 PR PBB SHADOW E&M-EST. PATIENT-LVL III: CPT | Mod: PBBFAC,,, | Performed by: OBSTETRICS & GYNECOLOGY

## 2024-05-03 PROCEDURE — 3074F SYST BP LT 130 MM HG: CPT | Mod: CPTII,S$GLB,, | Performed by: NURSE PRACTITIONER

## 2024-05-03 PROCEDURE — 99214 OFFICE O/P EST MOD 30 MIN: CPT | Mod: S$GLB,,, | Performed by: NURSE PRACTITIONER

## 2024-05-03 PROCEDURE — 99999 PR PBB SHADOW E&M-EST. PATIENT-LVL III: CPT | Mod: PBBFAC,,, | Performed by: NURSE PRACTITIONER

## 2024-05-03 PROCEDURE — 85025 COMPLETE CBC W/AUTO DIFF WBC: CPT | Performed by: OBSTETRICS & GYNECOLOGY

## 2024-05-03 PROCEDURE — 3078F DIAST BP <80 MM HG: CPT | Mod: CPTII,S$GLB,, | Performed by: OBSTETRICS & GYNECOLOGY

## 2024-05-03 PROCEDURE — 84443 ASSAY THYROID STIM HORMONE: CPT | Performed by: OBSTETRICS & GYNECOLOGY

## 2024-05-03 PROCEDURE — 1160F RVW MEDS BY RX/DR IN RCRD: CPT | Mod: CPTII,S$GLB,, | Performed by: NURSE PRACTITIONER

## 2024-05-03 NOTE — PROGRESS NOTES
Chief Complaint   Patient presents with    Vaginal Bleeding       HISTORY OF PRESENT ILLNESS:   Priya Conrad is a 38 y.o. female with h/o 3 c-sections, abdominoplasty and appendectomy and colectomy who presents for f/u AUB  Patient's last menstrual period was 2024..  She has complains of still having heavy cycles, they are monthly but she will change 7 pads in a day for 1st 2 days. Is painful. Had colectomy last month so still healing from that and cycle was delayed until now.    Her cycle that started this month lasted for 2 weeks and was heavy. Was going to do provera but stopped before she could take it. She felt weak and tired. It normally doesn't last for that long.     Past Medical History:   Diagnosis Date    Anxiety     Heart murmur     Hypertension     Migraine        Past Surgical History:   Procedure Laterality Date     SECTION      COLONOSCOPY  10/20/2021    COLONOSCOPY N/A 10/20/2021    Procedure: COLONOSCOPY 2 day golytely;  Surgeon: Zheng Woodruff MD;  Location: [facility];  Service: Endoscopy;  Laterality: N/A;    EPIDURAL STEROID INJECTION INTO CERVICAL SPINE N/A 2023    Procedure: C7-T1 LIZANDRO;  Surgeon: Eric Jessica MD;  Location: [facility] PAIN MANAGEMENT;  Service: Pain Management;  Laterality: N/A;  30 mins    EPIDURAL STEROID INJECTION INTO CERVICAL SPINE N/A 2023    Procedure: C7-T1 LIZANDRO;  Surgeon: Jose Metcalf DO;  Location: [facility] PAIN MANAGEMENT;  Service: Pain Management;  Laterality: N/A;  15 mins    ESOPHAGOGASTRODUODENOSCOPY N/A 10/20/2021    Procedure: EGD (ESOPHAGOGASTRODUODENOSCOPY);  Surgeon: Zheng Woodruff MD;  Location: [facility];  Service: Endoscopy;  Laterality: N/A;    FLEXIBLE SIGMOIDOSCOPY N/A 3/4/2024    Procedure: SIGMOIDOSCOPY, FLEXIBLE;  Surgeon: KOKO Naylor MD;  Location: [facility] OR [address] [place] [address];  Service: Colon and Rectal;  Laterality: N/A;    LAPAROSCOPIC APPENDECTOMY N/A 7/10/2023    Procedure: APPENDECTOMY, LAPAROSCOPIC;   Surgeon: Ki De La Fuente MD;  Location: [facility];  Service: General;  Laterality: N/A;    LAPAROSCOPIC TOTAL COLECTOMY N/A 3/4/2024    Procedure: COLECTOMY, TOTAL, LAPAROSCOPIC,ERAS low;  Surgeon: KOKO Naylor MD;  [facility];  Service: Colon and Rectal;  Laterality: N/A;    TUBAL LIGATION      Tummy Tuck      UPPER GASTROINTESTINAL ENDOSCOPY  10/20/2021       Social History     Socioeconomic History    Marital status:     Number of children: 3   Occupational History    Occupation: medical assistant     Comment: 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[place]     Social Determinants of Health     Financial Resource Strain: Medium Risk (4/19/2024)    Overall Financial Resource Strain (CARDIA)     Difficulty of Paying Living Expenses: Somewhat hard   Food Insecurity: Food Insecurity Present (4/19/2024)    Hunger Vital Sign     Worried About Running Out of Food in the Last Year: Never true     Ran Out of Food in the Last Year: Sometimes true   Transportation Needs: No Transportation Needs (4/19/2024)    PRAPARE - Transportation     Lack of Transportation (Medical): No     Lack of Transportation (Non-Medical): No   Physical Activity: Insufficiently Active (4/19/2024)    Exercise Vital Sign     Days of Exercise per Week: 3 days     Minutes of Exercise per Session: 40 min   Stress: Stress Concern Present (4/19/2024)    [employer] - Occupational Stress Questionnaire     Feeling of Stress : Very much   Housing Stability: Unknown (3/5/2024)    Housing Stability Vital Sign     Unable to Pay for Housing in the Last Year: No     Unstable Housing in the Last Year: No       Family History   Problem Relation Name Age of Onset    Hypertension Mother       Breast cancer Paternal Aunt      Heart failure Maternal Grandmother      Heart failure Maternal Grandfather         OB History    Para Term  AB Living   3 3 3     3   SAB IAB Ectopic Multiple Live Births                  # Outcome Date GA Lbr Tacos/2nd Weight Sex Type Anes PTL Lv   3 Term            2 Term            1 Term                COMPREHENSIVE GYN HISTORY:  PAP History: had abnormal Pap in  with leep, normal  NILM/HPV-;  NILm/hPV-  Infection History: Denies STDs. Denies PID.  Benign History: has uterine fibroids. Denies ovarian cysts. Denies endometriosis Denies other conditions.  Cancer History: Denies cervical cancer. Denies uterine cancer or hyperplasia. Denies ovarian cancer. Denies vulvar cancer or pre-cancer. Denies vaginal cancer or pre-cancer. Denies breast cancer. Denies colon cancer.  Cycle: , heavy and painful   Had BTL   2 relatives on dad side with breast cancer, aunt, postmenopausal, then a cousin in 40s   Did patches for AUB     ROS:  Otherwise negative       /79   Wt 78.4 kg (172 lb 13.5 oz)   LMP 2024   BMI 28.76 kg/m²     APPEARANCE: Well nourished, well developed, in no acute distress.  Breasts: normal appearing, no masses or lymphadenopathy in left side, no rashes and normal appearing nipple with discharge    2024 TVUS FINDINGS:  Uterus appears normal measuring 13.6 x 5.2 x 8.1 cm.  The endometrial thickness is 9 mm.  Anterior intramural fibroid is noted measuring 3.9 x 2.6 x 3.2 cm.  Prior measuring was 2.2 x 1.9 x 2.5 cm.  Nabothian cysts again seen within the cervix.     Right ovary is normal and measures 3.9 x 2.1 x 3.3 cm.  1.9 cm cyst within the right ovary.    Left ovary is normal and measures 2.5 x 1.9 x 1.8 cm. Flow is noted bilaterally.     There is no free pelvic fluid.     Impression:     Enlarged myomatous uterus with anterior intramural fibroid which is enlarged since prior exam.    1. Abnormal uterine bleeding (AUB)    2.  Fibroids    3. Breast pain    4. Mammary duct ectasia of left breast            Plan:   Discussed treatment for fibroids and can't do estrogen with her HTN being uncontrolled, discussed progesterone only choices, oriohnn and myfembree and lysteda. Would recommend exhausting all choices before surgery since has had several abdominal surgeries. She would like to try lysteda. But going to see what her cycles do.   Will send to breast surgery for breast pain/ectasia. Could be muscular/nerve but in the same area      Face to Face time with patient: 20 minutes of total time spent on the encounter, which includes face to face time and non-face to face time preparing to see the patient (eg, review of tests), Obtaining and/or reviewing separately obtained history, Documenting clinical information in the electronic or other health record, Independently interpreting results (not separately reported) and communicating results to the patient/family/caregiver, or Care coordination (not separately reported).

## 2024-05-03 NOTE — PROGRESS NOTES
This note was completed with dictation software and grammatical errors may exist.    PCP: Vinny Rose MD    Chief Complaint   Patient presents with    Follow-up     Lt. 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  He is currently on gabapentin 300 mg b.i.d. which she states helps mildly but would like to be considered for a repeat cervical LIZANDRO.  Her last cervical LIZANDRO was December of 2023 which provided her with 5-6 months of relief.  Today she denies any profound weakness denies any recent incident trauma denies inability to write with a pen.     Original HISTORY OF PRESENT ILLNESS: Priya Conrad is a 38 y.o. year old female patient who has a past medical history of Anxiety, Heart murmur, Hypertension, and Migraine. She presents in referral from No ref. provider found for cervical radiculopathy    Original Pain Description:  The pain is located in the right neck and is radiating to the left arm and all 5 fingers of her hand . The pain is described as aching, sharp, and stabbing. Exacerbating factors: turning her head to the right. Mitigating factors rest. Symptoms interfere with daily activity and work. The patient feels like symptoms have been worsening. Patient denies night fever/night sweats, urinary incontinence, bowel incontinence, significant weight loss, significant motor weakness, and loss of sensations. Pt notes long standing hx of left hand weakness which is worse with the neck pain.      Original PAIN SCORES:  Best: Pain is 4  Worst: Pain is 10  Current: Pain is  4        5/3/2024    11:16 AM   Last 3 PDI Scores   Pain Disability Index (PDI) 18       INTERVAL HISTORY: (Newest visit at the bottom)   Interval History (Date):       6 weeks of Conservative therapy:  PT: Completed   Chiro: No  HEP: Currently participating      Treatments / Medications: (Ice/Heat/NSAIDS/APAP/etc):  Gabapentin 600mg qhs  Naproxyn 500mg PRN    Antiplatelets/Anticoagulants:  No    Interventional Pain Procedures: (Previous injections)  No    Past Surgical History:   Procedure Laterality Date     SECTION      COLONOSCOPY  10/20/2021    COLONOSCOPY N/A 10/20/2021    Procedure: COLONOSCOPY 2 day golytely;  Surgeon: Zheng Woodruff MD;  Location: [facility];  Service: Endoscopy;  Laterality: N/A;    EPIDURAL STEROID INJECTION INTO CERVICAL SPINE N/A 2023    Procedure: C7-T1 LIZANDRO;  Surgeon: Eric Jessica MD;  Location: [facility] PAIN MANAGEMENT;  Service: Pain Management;  Laterality: N/A;  30 mins    EPIDURAL STEROID INJECTION INTO CERVICAL SPINE N/A 2023    Procedure: C7-T1 LIZANDRO;  Surgeon: Jose Metcalf DO;  Location: [facility] PAIN MANAGEMENT;  Service: Pain Management;  Laterality: N/A;  15 mins    ESOPHAGOGASTRODUODENOSCOPY N/A 10/20/2021    Procedure: EGD (ESOPHAGOGASTRODUODENOSCOPY);  Surgeon: Zheng Woodruff MD;  Location: [facility];  Service: Endoscopy;  Laterality: N/A;    FLEXIBLE SIGMOIDOSCOPY N/A 3/4/2024    Procedure: SIGMOIDOSCOPY, FLEXIBLE;  Surgeon: KOKO Naylor MD;  [address];  Service: Colon and Rectal;  Laterality: N/A;    LAPAROSCOPIC APPENDECTOMY N/A 7/10/2023    Procedure: APPENDECTOMY, LAPAROSCOPIC;  Surgeon: Ki De La Fuente MD;  Location: [facility];  Service: General;  Laterality: N/A;    LAPAROSCOPIC TOTAL COLECTOMY N/A 3/4/2024    Procedure: COLECTOMY, TOTAL, LAPAROSCOPIC,ERAS low;  Surgeon: KOKO Naylor MD;  Location: [facility] OR 2ND FLR;  Service: Colon and Rectal;  Laterality: N/A;    TUBAL LIGATION      Tummy Tuck       UPPER GASTROINTESTINAL ENDOSCOPY  10/20/2021       Social History     Socioeconomic History    Marital status:     Number of children: 3   Occupational History    Occupation: medical assistant     Comment: [employer]   Tobacco Use    Smoking status: Never    Smokeless tobacco: Never   Substance and Sexual Activity    Alcohol use: Not Currently     Comment: socially    Drug use: No   Social History Narrative    , 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[place]     Social Determinants of Health     Financial Resource Strain: Medium Risk (4/19/2024)    Overall Financial Resource Strain (CARDIA)     Difficulty of Paying Living Expenses: Somewhat hard   Food Insecurity: Food Insecurity Present (4/19/2024)    Hunger Vital Sign     Worried About Running Out of Food in the Last Year: Never true     Ran Out of Food in the Last Year: Sometimes true   Transportation Needs: No Transportation Needs (4/19/2024)    PRAPARE - Transportation     Lack of Transportation (Medical): No     Lack of Transportation (Non-Medical): No   Physical Activity: Insufficiently Active (4/19/2024)    Exercise Vital Sign     Days of Exercise per Week: 3 days     Minutes of Exercise per Session: 40 min   Stress: Stress Concern Present (4/19/2024)    [employer] - Occupational Stress Questionnaire     Feeling of Stress : Very much   Housing Stability: Unknown (3/5/2024)    Housing Stability Vital Sign     Unable to Pay for Housing in the Last Year: No     Unstable Housing in the Last Year: No       Medications/Allergies: See med card    ROS:  GENERAL: No fever. No chills. No fatigue. Denies weight loss. Denies weight gain.  HEENT: Denies headaches. Denies vision change. Denies eye pain. Denies double vision. Denies ear pain.   CV: Denies chest pain.   PULM: Denies of shortness of breath.  GI: Denies constipation. No diarrhea. No abdominal pain. Denies nausea. Denies vomiting. No blood in stool.  HEME: Denies  bleeding problems.  : Denies urgency. No painful urination. No blood in urine.  MS: Denies joint stiffness. Denies joint swelling.  Denies back pain.  SKIN: Denies rash.   NEURO: Denies seizures. No weakness.  PSYCH:  Denies difficulty sleeping. No anxiety. Denies depression. No suicidal thoughts.       VITALS:   Vitals:    05/03/24 1112   BP: 123/77   Pulse: 82     There is no height or weight on file to calculate BMI.      5/3/2024    11:16 AM   Last 3 PDI Scores   Pain Disability Index (PDI) 18       PHYSICAL EXAM:   GENERAL: Well appearing, in no acute distress, alert and oriented x3.  PSYCH:  Mood and affect appropriate.  SKIN: Skin color, texture, turgor normal, no rashes or lesions.  HEENT:  Normocephalic, atraumatic. Cranial nerves grossly intact.  NECK: Spurling's positive. No pain to palpation over the cervical paraspinous muscles. No pain to palpation over facets. No pain with neck flexion, but has pain with extension, or lateral flexion.   PULM: No evidence of respiratory difficulty, symmetric chest rise.  GI:  Non-distended  BACK: Normal range of motion. No pain to palpation over the spinous processes. No pain to palpation over facet joints. There is no pain with palpation over the sacroiliac joints bilaterally.   EXTREMITIES: No deformities, edema, or skin discoloration.   MUSCULOSKELETAL: Shoulder, hip, and knee provocative maneuvers are negative. No atrophy is noted.  NEURO: Sensation is equal and appropriate bilaterally. Bilateral right upper and lower extremity strength is normal and symmetric. LUE 4/5  strength. Bilateral upper and lower extremity coordination and muscle stretch reflexes are physiologic and symmetric. Plantar response are downgoing. Straight leg raising in the supine position is negative to radicular pain. Negative Anastasia's  GAIT: normal.      LABS:    Lab Results   Component Value Date    HGBA1C 5.4 05/28/2020       Lab Results   Component Value Date    WBC 7.14  05/03/2024    HGB 13.6 05/03/2024    HCT 42.4 05/03/2024    MCV 89 05/03/2024     05/03/2024             IMAGING:    I have personally reviewed the MRI cervical spine dated 10/16/23: Multilevel degenerative changes most pronounced C4-5 with C4-5 moderate central and right neural foraminal narrowing.       ASSESSMENT: 38 y.o. year old female with pain, consistent with:    Encounter Diagnoses   Name Primary?    Cervical radiculopathy Yes    DDD (degenerative disc disease), cervical     Chronic pain syndrome        DISCUSSION: Priya Conrad is a  who comes to us with cervical radiculopathy       DISCUSSION: Priya Conrad is a 38 y.o. female who  has a past medical history of Anxiety, Heart murmur, Hypertension, and Migraine.  By history and examination this patient has chronicneck pain with left radiculopathy in the distribution of C4 and C5.  The underlying cause cause is facet arthritis, degenerative disc disease, foraminal stenosis, and central canal stenosis.  Pathology is confirmed by imaging.  We discussed the underlying diagnoses and multiple treatment options including non-opioid medications, interventional procedures, physical therapy, home exercise, core muscle enhancement, activity modification, and weight loss.  The risks and benefits of each treatment option were discussed and all questions were answered.  She was previously provided a cervical LIZANDRO targeting C7-T1 in December of 2023 provided her excellent relief 5-6 months I am recommending repeating this injection.        PLAN:  - I have stressed the importance of physical activity and a home exercise plan to help with pain and improve health.  - Patient can continue with medications for now since they are providing benefits, using them appropriately, and without side effects.  - Counseled patient regarding the importance of activity modification and physical therapy.  - Interventions: Schedule for cervical interlaminar  epidural steroid injection at C7-T1 to help with pain and to progress with a home exercise program. Explained the risks and benefits of the procedure in detail with the patient today in clinic along with alternative treatment options, and the patient elected to pursue the intervention at this time.    - Medications: Continue  Neurontin 600mg gradually to twice a day to help with radicular pain., Start Mobic 7.5mg daily PRN with food to help with pain and inflammation. , and Continue  Zanaflex 4mg at bedtime PRN to help with muscular symptoms.    - Imaging: Reviewed available imaging with patient and answered any questions they had regarding study.  - Records: Reviewed/Obtain old records from outside physicians and imaging  - Follow up visit: return to clinic in 2-3 virtually      ANDREI Faria  Interventional Pain Management    05/03/2024

## 2024-05-03 NOTE — TELEPHONE ENCOUNTER
----- Message from VESNA Montelongo sent at 5/3/2024 11:35 AM CDT -----  Regarding: Order for OSBALDO WILEY    Patient Name: OSBALDO WILEY([id number])  Sex: Female  : 1985      PCP: ESTELA ANTHONY    Center: [facility]     Types of orders made on 2024: Procedure Request    Order Date:5/3/2024  Ordering User:OLGA MILES [[id number]]  Encounter Provider:Olga Miles FNP [[id number]]  Authorizing Provider: NONA Miles FNP [[id number]]  Supervising Provider:AMARI DOMINGUEZ [[id number]]  Type of Supervision:Collaborating Physician  Department:[facility] PAIN MANAGEMENT[[id number]]    Common Order Information  Procedure -> Epidural Injection (specify level) Cmt: C7-T1    Pre-op Diagnosis -> Cervical radiculopathy       -> DDD (degenerative disc disease), cervical     Order Specific Information  Order: Procedure Request Order for Pain Manage  ment [Custom: [id number]]  Order #:          [id number]: 1 FUTURE    Priority: Routine  Class: Clinic Performed    Future Order Information      Expires on:2025            Expected by:2024                   Comment:Please provide her a Virtual follow up    Associated Diagnoses      M54.12 Cervical radiculopathy      M50.30 DDD (degenerative disc disease), cervical      G89.4 Chronic pain synd  kathy      Physician -> Aguillera         Is patient on anti-coagulants? -> No         Facility Name: -> [facility]         Follow-up: -> 2 weeks           Priority: Routine  Class: Clinic Performed    Future Order Information      Expires on:2025            Expected by:2024                   Comment:Please provide her a Virtual follow up    Associated Diagnoses      M54.12 Cervical radiculopathy        M50.30 DDD (degenerative disc disease), cervical      G89.4 Chronic pain syndrome      Procedure -> Epidural Injection (specify level) Cmt: C7-T1        Physician -> Aguillera         Is patient on anti-coagulants? -> No         Pre-op Diagnosis -> Cervical  radiculopathy           -> DDD (degenerative disc disease), cervical         Facility Name: -> [facility]         Follow-up: -> 2 weeks

## 2024-05-06 ENCOUNTER — PATIENT MESSAGE (OUTPATIENT)
Dept: OBSTETRICS AND GYNECOLOGY | Facility: CLINIC | Age: 39
End: 2024-05-06
Payer: COMMERCIAL

## 2024-05-13 DIAGNOSIS — Z13.21 SCREENING FOR MALNUTRITION: ICD-10-CM

## 2024-05-13 DIAGNOSIS — N95.1 MENOPAUSAL STATE: ICD-10-CM

## 2024-05-13 DIAGNOSIS — Z00.00 ROUTINE MEDICAL EXAM: Primary | ICD-10-CM

## 2024-05-14 ENCOUNTER — LAB VISIT (OUTPATIENT)
Dept: LAB | Facility: HOSPITAL | Age: 39
End: 2024-05-14
Attending: OBSTETRICS & GYNECOLOGY
Payer: COMMERCIAL

## 2024-05-14 DIAGNOSIS — N95.1 MENOPAUSAL STATE: ICD-10-CM

## 2024-05-14 DIAGNOSIS — Z00.00 ROUTINE MEDICAL EXAM: ICD-10-CM

## 2024-05-14 DIAGNOSIS — Z13.21 SCREENING FOR MALNUTRITION: ICD-10-CM

## 2024-05-14 LAB
25(OH)D3+25(OH)D2 SERPL-MCNC: 18 NG/ML (ref 30–96)
ALBUMIN SERPL BCP-MCNC: 4.2 G/DL (ref 3.5–5.2)
ALP SERPL-CCNC: 68 U/L (ref 38–126)
ALT SERPL W/O P-5'-P-CCNC: 38 U/L (ref 10–44)
ANION GAP SERPL CALC-SCNC: 9 MMOL/L (ref 8–16)
AST SERPL-CCNC: 30 U/L (ref 15–46)
BASOPHILS # BLD AUTO: 0.03 K/UL (ref 0–0.2)
BASOPHILS NFR BLD: 0.4 % (ref 0–1.9)
BILIRUB SERPL-MCNC: 0.5 MG/DL (ref 0.1–1)
CALCIUM SERPL-MCNC: 9.8 MG/DL (ref 8.7–10.5)
CHLORIDE SERPL-SCNC: 101 MMOL/L (ref 95–110)
CO2 SERPL-SCNC: 31 MMOL/L (ref 23–29)
CREAT SERPL-MCNC: 0.89 MG/DL (ref 0.5–1.4)
DHEA-S SERPL-MCNC: 161.1 UG/DL (ref 74.8–410.2)
DIFFERENTIAL METHOD BLD: ABNORMAL
EOSINOPHIL # BLD AUTO: 0 K/UL (ref 0–0.5)
EOSINOPHIL NFR BLD: 0.3 % (ref 0–8)
ERYTHROCYTE [DISTWIDTH] IN BLOOD BY AUTOMATED COUNT: 14.4 % (ref 11.5–14.5)
EST. GFR  (NO RACE VARIABLE): >60 ML/MIN/1.73 M^2
ESTIMATED AVG GLUCOSE: 111 MG/DL (ref 68–131)
ESTRADIOL SERPL-MCNC: 78 PG/ML
FOLATE SERPL-MCNC: 6.6 NG/ML (ref 4–24)
FSH SERPL-ACNC: 1.88 MIU/ML
GLUCOSE SERPL-MCNC: 107 MG/DL (ref 70–110)
HBA1C MFR BLD: 5.5 % (ref 4–5.6)
HCT VFR BLD AUTO: 41.2 % (ref 37–48.5)
HGB BLD-MCNC: 13.1 G/DL (ref 12–16)
IMM GRANULOCYTES # BLD AUTO: 0.03 K/UL (ref 0–0.04)
IMM GRANULOCYTES NFR BLD AUTO: 0.4 % (ref 0–0.5)
LYMPHOCYTES # BLD AUTO: 1 K/UL (ref 1–4.8)
LYMPHOCYTES NFR BLD: 13.4 % (ref 18–48)
MCH RBC QN AUTO: 28.3 PG (ref 27–31)
MCHC RBC AUTO-ENTMCNC: 31.8 G/DL (ref 32–36)
MCV RBC AUTO: 89 FL (ref 82–98)
MONOCYTES # BLD AUTO: 0.6 K/UL (ref 0.3–1)
MONOCYTES NFR BLD: 8.2 % (ref 4–15)
NEUTROPHILS # BLD AUTO: 5.8 K/UL (ref 1.8–7.7)
NEUTROPHILS NFR BLD: 77.3 % (ref 38–73)
NRBC BLD-RTO: 0 /100 WBC
PLATELET # BLD AUTO: 317 K/UL (ref 150–450)
PMV BLD AUTO: 10.3 FL (ref 9.2–12.9)
POTASSIUM SERPL-SCNC: 3.3 MMOL/L (ref 3.5–5.1)
PROGEST SERPL-MCNC: 0.1 NG/ML
PROT SERPL-MCNC: 7.6 G/DL (ref 6–8.4)
RBC # BLD AUTO: 4.63 M/UL (ref 4–5.4)
SODIUM SERPL-SCNC: 141 MMOL/L (ref 136–145)
T3FREE SERPL-MCNC: 2.5 PG/ML (ref 2.3–4.2)
T4 FREE SERPL-MCNC: 1.3 NG/DL (ref 0.71–1.51)
TESTOST SERPL-MCNC: 24 NG/DL (ref 5–73)
TSH SERPL DL<=0.005 MIU/L-ACNC: 0.84 UIU/ML (ref 0.4–4)
UUN UR-MCNC: 7 MG/DL (ref 7–17)
VIT B12 SERPL-MCNC: 490 PG/ML (ref 210–950)
WBC # BLD AUTO: 7.52 K/UL (ref 3.9–12.7)

## 2024-05-14 PROCEDURE — 82746 ASSAY OF FOLIC ACID SERUM: CPT | Mod: PN | Performed by: OBSTETRICS & GYNECOLOGY

## 2024-05-14 PROCEDURE — 83036 HEMOGLOBIN GLYCOSYLATED A1C: CPT | Performed by: OBSTETRICS & GYNECOLOGY

## 2024-05-14 PROCEDURE — 84443 ASSAY THYROID STIM HORMONE: CPT | Mod: PN | Performed by: OBSTETRICS & GYNECOLOGY

## 2024-05-14 PROCEDURE — 82670 ASSAY OF TOTAL ESTRADIOL: CPT | Mod: PN | Performed by: OBSTETRICS & GYNECOLOGY

## 2024-05-14 PROCEDURE — 83001 ASSAY OF GONADOTROPIN (FSH): CPT | Mod: PN | Performed by: OBSTETRICS & GYNECOLOGY

## 2024-05-14 PROCEDURE — 84439 ASSAY OF FREE THYROXINE: CPT | Performed by: OBSTETRICS & GYNECOLOGY

## 2024-05-14 PROCEDURE — 84403 ASSAY OF TOTAL TESTOSTERONE: CPT | Mod: PN | Performed by: OBSTETRICS & GYNECOLOGY

## 2024-05-14 PROCEDURE — 36415 COLL VENOUS BLD VENIPUNCTURE: CPT | Mod: PN | Performed by: OBSTETRICS & GYNECOLOGY

## 2024-05-14 PROCEDURE — 84144 ASSAY OF PROGESTERONE: CPT | Mod: PN | Performed by: OBSTETRICS & GYNECOLOGY

## 2024-05-14 PROCEDURE — 80053 COMPREHEN METABOLIC PANEL: CPT | Mod: PN | Performed by: OBSTETRICS & GYNECOLOGY

## 2024-05-14 PROCEDURE — 82306 VITAMIN D 25 HYDROXY: CPT | Mod: PN | Performed by: OBSTETRICS & GYNECOLOGY

## 2024-05-14 PROCEDURE — 84481 FREE ASSAY (FT-3): CPT | Mod: PN | Performed by: OBSTETRICS & GYNECOLOGY

## 2024-05-14 PROCEDURE — 82607 VITAMIN B-12: CPT | Mod: PN | Performed by: OBSTETRICS & GYNECOLOGY

## 2024-05-14 PROCEDURE — 84402 ASSAY OF FREE TESTOSTERONE: CPT | Mod: PN | Performed by: OBSTETRICS & GYNECOLOGY

## 2024-05-14 PROCEDURE — 85025 COMPLETE CBC W/AUTO DIFF WBC: CPT | Mod: PN | Performed by: OBSTETRICS & GYNECOLOGY

## 2024-05-14 PROCEDURE — 82627 DEHYDROEPIANDROSTERONE: CPT | Mod: PN | Performed by: OBSTETRICS & GYNECOLOGY

## 2024-05-15 ENCOUNTER — TELEPHONE (OUTPATIENT)
Dept: SURGERY | Facility: CLINIC | Age: 39
End: 2024-05-15
Payer: COMMERCIAL

## 2024-05-15 NOTE — TELEPHONE ENCOUNTER
----- Message from Starr Griffith MA sent at 5/14/2024  3:59 PM CDT -----  Regarding: FW: appt access  Contact: pt [phone]    ----- Message -----  From: Lo Nguyen PA-C  Sent: 5/14/2024   3:54 PM CDT  To: Starr Griffith MA  Subject: RE: appt access                                  Sang I would send that to Edy  ----- Message -----  From: Starr Griffith MA  Sent: 5/14/2024   3:23 PM CDT  To: Lo Nguyen PA-C  Subject: FW: appt access                                  Do you want a navigator to contact her?  ----- Message -----  From: Rosa Rodriguez  Sent: 5/14/2024   2:43 PM CDT  To: [facility]anch[facility] Staff  Subject: appt access                                      Pt calling to speak to provider regarding upcoming appt and details on what to expect. Pls call

## 2024-05-17 ENCOUNTER — OFFICE VISIT (OUTPATIENT)
Dept: SURGERY | Facility: CLINIC | Age: 39
End: 2024-05-17
Payer: COMMERCIAL

## 2024-05-17 VITALS
BODY MASS INDEX: 29.82 KG/M2 | DIASTOLIC BLOOD PRESSURE: 103 MMHG | SYSTOLIC BLOOD PRESSURE: 156 MMHG | HEART RATE: 79 BPM | WEIGHT: 179 LBS | HEIGHT: 65 IN

## 2024-05-17 DIAGNOSIS — N64.4 BREAST PAIN: Primary | ICD-10-CM

## 2024-05-17 DIAGNOSIS — N60.42 MAMMARY DUCT ECTASIA OF LEFT BREAST: ICD-10-CM

## 2024-05-17 DIAGNOSIS — Z12.39 SCREENING BREAST EXAMINATION: ICD-10-CM

## 2024-05-17 LAB — TESTOST FREE SERPL-MCNC: 0.5 PG/ML

## 2024-05-17 PROCEDURE — 3044F HG A1C LEVEL LT 7.0%: CPT | Mod: CPTII,S$GLB,, | Performed by: PHYSICIAN ASSISTANT

## 2024-05-17 PROCEDURE — 1159F MED LIST DOCD IN RCRD: CPT | Mod: CPTII,S$GLB,, | Performed by: PHYSICIAN ASSISTANT

## 2024-05-17 PROCEDURE — 3080F DIAST BP >= 90 MM HG: CPT | Mod: CPTII,S$GLB,, | Performed by: PHYSICIAN ASSISTANT

## 2024-05-17 PROCEDURE — 99999 PR PBB SHADOW E&M-EST. PATIENT-LVL III: CPT | Mod: PBBFAC,,, | Performed by: PHYSICIAN ASSISTANT

## 2024-05-17 PROCEDURE — 3077F SYST BP >= 140 MM HG: CPT | Mod: CPTII,S$GLB,, | Performed by: PHYSICIAN ASSISTANT

## 2024-05-17 PROCEDURE — 3008F BODY MASS INDEX DOCD: CPT | Mod: CPTII,S$GLB,, | Performed by: PHYSICIAN ASSISTANT

## 2024-05-17 PROCEDURE — 99215 OFFICE O/P EST HI 40 MIN: CPT | Mod: S$GLB,,, | Performed by: PHYSICIAN ASSISTANT

## 2024-05-17 NOTE — PROGRESS NOTES
[facility]  Department of Surgery    REFERRING:  Ariane Parker MD  [address] [address]  Vinny Rose MD  CHIEF COMPLAINT: left breast pain    Subjective:      Priya Conrad is a 38 y.o. premenopausal female referred for evaluation of breast pain. Change was noted a few months ago.  Patient does routinely do self breast exams.  Patient has noted a change on breast exam.  Patient denies nipple discharge. Patient denies to previous breast biopsy. Patient denies a personal history of breast cancer.    GYN History:  Age of menarche was 11. LMP: current. Patient denies hormonal therapy. Patient is . Age of first live birth was 17.  Patient did breast feed 2nd child for 1 month.    FAMILY history:  Paternal Aunt:     Past Medical History:   Diagnosis Date    Anxiety     Heart murmur     Hypertension     Migraine      Past Surgical History:   Procedure Laterality Date     SECTION      COLONOSCOPY  10/20/2021    COLONOSCOPY N/A 10/20/2021    Procedure: COLONOSCOPY 2 day golytely;  Surgeon: Zheng Woodruff MD;  Location: [facility];  Service: Endoscopy;  Laterality: N/A;    EPIDURAL STEROID INJECTION INTO CERVICAL SPINE N/A 2023    Procedure: C7-T1 LIZANDRO;  Surgeon: Eric Jessica MD;  Location: [facility] PAIN MANAGEMENT;  Service: Pain Management;  Laterality: N/A;  30 mins    EPIDURAL STEROID INJECTION INTO CERVICAL SPINE N/A 2023    Procedure: C7-T1 LIZANDRO;  Surgeon: Jose Metcalf DO;  Location: [facility] PAIN MANAGEMENT;  Service: Pain Management;  Laterality: N/A;  15 mins    ESOPHAGOGASTRODUODENOSCOPY N/A 10/20/2021    Procedure: EGD (ESOPHAGOGASTRODUODENOSCOPY);  Surgeon: Zheng Woodruff MD;  Location: [facility];  Service: Endoscopy;  Laterality: N/A;    FLEXIBLE SIGMOIDOSCOPY N/A 3/4/2024    Procedure: SIGMOIDOSCOPY, FLEXIBLE;  Surgeon: KOKO Naylor MD;  Location: [facility] OR [address] [place] [address];  Service: Colon and Rectal;  Laterality: N/A;    LAPAROSCOPIC  APPENDECTOMY N/A 7/10/2023    Procedure: APPENDECTOMY, LAPAROSCOPIC;  Surgeon: Ki De La Fuente MD;  Location: [facility];  Service: General;  Laterality: N/A;    LAPAROSCOPIC TOTAL COLECTOMY N/A 3/4/2024    Procedure: COLECTOMY, TOTAL, LAPAROSCOPIC,ERAS low;  Surgeon: KOKO Naylor MD;  Location: [facility] OR [address] [place] [address];  Service: Colon and Rectal;  Laterality: N/A;    TUBAL LIGATION      [facility]      UPPER GASTROINTESTINAL ENDOSCOPY  10/20/2021     Current Outpatient Medications on File Prior to Visit   Medication Sig Dispense Refill    ALPRAZolam (XANAX) 0.5 MG tablet Take 0.5 mg by mouth 2 (two) times daily as needed.      amLODIPine (NORVASC) 10 MG tablet Take 10 mg by mouth 2 (two) times daily.      FLUoxetine 20 MG capsule Take 20 mg by mouth once daily. (Patient not taking: Reported on 4/17/2024)      fluticasone propionate (FLONASE) 50 mcg/actuation nasal spray 2 sprays (100 mcg total) by Each Nostril route once daily. (Patient not taking: Reported on 5/3/2024) 9.9 mL 11    gabapentin (NEURONTIN) 300 MG capsule Take 600 mg by mouth every evening.      hydrocortisone 2.5 % cream Apply topically 2 (two) times daily. for 10 days (Patient not taking: Reported on 5/3/2024) 28 g 5    levocetirizine (XYZAL) 5 MG tablet Take 1 tablet (5 mg total) by mouth every evening. (Patient not taking: Reported on 5/3/2024) 30 tablet 11    medroxyPROGESTERone (PROVERA) 10 MG tablet Take 1 tablet (10 mg total) by mouth 2 (two) times a day. for 7 days (Patient not taking: Reported on 5/3/2024) 14 tablet 0    pantoprazole (PROTONIX) 40 MG tablet Take 1 tablet (40 mg total) by mouth once daily. (Patient not taking: Reported on 5/3/2024) 30 tablet 3    propranoloL (INDERAL LA) 120 MG 24 hr capsule Take 120 mg by mouth once daily.      tiZANidine (ZANAFLEX) 4 MG tablet Take 1 tablet (4 mg total) by mouth nightly as needed (muscle tightness and spasms). 30 tablet 1     No current facility-administered medications on file prior to  visit.     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[employer]     Social Determinants of Health     Financial Resource Strain: Medium Risk (4/19/2024)    Overall Financial Resource Strain (CARDIA)     Difficulty of Paying Living Expenses: Somewhat hard   Food Insecurity: Food Insecurity Present (4/19/2024)    Hunger Vital Sign     Worried About Running Out of Food in the Last Year: Never true     Ran Out of Food in the Last Year: Sometimes true   Transportation Needs: No Transportation Needs (4/19/2024)    PRAPARE - Transportation     Lack of Transportation (Medical): No     Lack of Transportation (Non-Medical): No   Physical Activity: Insufficiently Active (4/19/2024)    Exercise Vital Sign     Days of Exercise per Week: 3 days     Minutes of Exercise per Session: 40 min   Stress: Stress Concern Present (4/19/2024)    [employer] [place] [employer] - Occupational Stress Questionnaire     Feeling of Stress : Very much   Housing Stability: Unknown (3/5/2024)    Housing Stability Vital Sign     Unable to Pay for Housing in the Last Year: No     Unstable Housing in the Last Year: No     Family History   Problem Relation Name Age of Onset    Hypertension Mother      Breast cancer Paternal Aunt      Heart failure Maternal Grandmother      Heart failure Maternal Grandfather         Review of Systems  Review of Systems   Constitutional:  Negative for chills, fever and malaise/fatigue.   HENT:  Negative for congestion.    Eyes:  Negative for discharge.   Respiratory:  Negative for cough, shortness of breath and stridor.    Cardiovascular:   "Negative for chest pain and palpitations.   Gastrointestinal:  Negative for abdominal pain and nausea.   Neurological:  Negative for headaches.   Psychiatric/Behavioral:  The patient is not nervous/anxious.           Objective:        BP (!) 156/103   Pulse 79   Ht 5' 5" (1.651 m)   Wt 81.2 kg (179 lb)   LMP 04/14/2024   BMI 29.79 kg/m²   Physical Exam   Vitals reviewed.  Constitutional: She is oriented to person, place, and time.   HENT:   Head: Normocephalic and atraumatic.   Nose: Nose normal.   Eyes: Pupils are equal, round, and reactive to light. Right eye exhibits no discharge. Left eye exhibits no discharge.   Pulmonary/Chest: Effort normal and breath sounds normal. No stridor. No respiratory distress. She exhibits no mass, no tenderness and no edema. Right breast exhibits no inverted nipple, no mass, no nipple discharge, no skin change and no tenderness. Left breast exhibits no inverted nipple, no mass, no nipple discharge, no skin change and no tenderness. No breast swelling or bleeding. Breasts are symmetrical.   Abdominal: Normal appearance.   Genitourinary: No breast swelling or bleeding.   Neurological: She is alert and oriented to person, place, and time.   Skin: Skin is warm and dry.     Psychiatric: Her behavior is normal. Mood, judgment and thought content normal.         Radiology review: Images personally reviewed by me in the clinic.    4/17/24 Bilateral Screening Mammo:  Findings:  This procedure was performed using tomosynthesis. Computer-aided detection was utilized in the interpretation of this examination.  The breasts are heterogeneously dense, which may obscure small masses.      Left  There is an asymmetry seen in the outer region of the left breast in the anterior depth on the CC view.      Right  There is no evidence of suspicious masses, calcifications, or other abnormal findings in the right breast.     Impression:  Left  Asymmetry: Left breast asymmetry at the outer anterior " position. Assessment: 0 - Incomplete. Diagnostic Mammogram and/or Ultrasound is recommended.      Right  There is no mammographic evidence of malignancy in the right breast.     BI-RADS Category:   Overall: 0 - Incomplete: Needs Additional Imaging Evaluation        Recommendation:  Diagnostic mammogram with possible ultrasound (if indicated) is recommended.      4/30/24 Left Diagnostic Mammo/US Left:    Findings:  This procedure was performed using tomosynthesis. Computer-aided detection was utilized in the interpretation of this examination.  Mammo Digital Diagnostic Left with Umang  The left breast is heterogeneously dense, which may obscure small masses.  There is focal asymmetry seen in the outer region of the left breast in the anterior depth.      US Breast Left Limited  There is duct ectasia seen in the outer region of the left breast at 4 o'clock, 2 cm from the nipple. The duct ectasia correlates with the mammogram finding.  No evidence of suspicious masses.     Impression:  Mammo Digital Diagnostic Left with Umang  There is no mammographic evidence of malignancy in the left breast.     US Breast Left Limited  There is no sonographic evidence of malignancy in the left breast.     BI-RADS Category:   Overall: 2 - Benign        Recommendation:  Routine screening mammogram in 1 year is recommended.    Assessment:      Priya Conrad is a 38 y.o. 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      Plan:   We discussed the options for management of breast pain.    Discussed continue with OTC therapies such as acetaminophen or nonsteroidal anti-inflammatory drugs (NSAIDs). They can both be used to relieve breast pain. Topical NSAIDS such as OTC Diclofenac (Volteran) gel can be used. Other recommendations include use of a supportive bra. Wearing a soft, supportive bra at night prevents the breasts from pulling down on the chest wall. Some women obtain relief from application  of warm compresses or ice packs. Also, referred for professional fitting of a supportive bra.      Discussed lifestyle recommendations such as decrease or elimination of caffeine. Also low-fat diet, high complex carbohydrate diet has been shown to be effective.       Alternative therapies such as Evening Primerose Oil (EPO) 1000mg twice daily has been found to be helpful for some patients, results are seen after 3-6 months.    Follow up in 2 months to assess for improvement of pain.     The patient is in agreement with the plan. Questions were encouraged and answered to patient's satisfaction. Priya will call our office with any questions or concerns.

## 2024-05-26 ENCOUNTER — HOSPITAL ENCOUNTER (EMERGENCY)
Facility: HOSPITAL | Age: 39
Discharge: HOME | End: 2024-05-27
Attending: STUDENT IN AN ORGANIZED HEALTH CARE EDUCATION/TRAINING PROGRAM
Payer: COMMERCIAL

## 2024-05-26 DIAGNOSIS — R07.9 CHEST PAIN: ICD-10-CM

## 2024-05-26 DIAGNOSIS — T14.8XXA BRUISE: ICD-10-CM

## 2024-05-26 DIAGNOSIS — R25.2 LEG CRAMPING: Primary | ICD-10-CM

## 2024-05-26 DIAGNOSIS — M79.89 LEG SWELLING: ICD-10-CM

## 2024-05-26 LAB
ALBUMIN SERPL BCP-MCNC: 3.4 G/DL (ref 3.5–5.2)
ALP SERPL-CCNC: 57 U/L (ref 38–126)
ALT SERPL W/O P-5'-P-CCNC: 35 U/L (ref 10–44)
ANION GAP SERPL CALC-SCNC: 6 MMOL/L (ref 8–16)
AST SERPL-CCNC: 34 U/L (ref 15–46)
B-HCG UR QL: NEGATIVE
BASOPHILS # BLD AUTO: 0.02 K/UL (ref 0–0.2)
BASOPHILS NFR BLD: 0.3 % (ref 0–1.9)
BILIRUB SERPL-MCNC: 0.4 MG/DL (ref 0.1–1)
CALCIUM SERPL-MCNC: 9 MG/DL (ref 8.7–10.5)
CHLORIDE SERPL-SCNC: 106 MMOL/L (ref 95–110)
CO2 SERPL-SCNC: 27 MMOL/L (ref 23–29)
CREAT SERPL-MCNC: 0.77 MG/DL (ref 0.5–1.4)
CTP QC/QA: YES
DIFFERENTIAL METHOD BLD: ABNORMAL
EOSINOPHIL # BLD AUTO: 0.1 K/UL (ref 0–0.5)
EOSINOPHIL NFR BLD: 0.8 % (ref 0–8)
ERYTHROCYTE [DISTWIDTH] IN BLOOD BY AUTOMATED COUNT: 15.1 % (ref 11.5–14.5)
EST. GFR  (NO RACE VARIABLE): >60 ML/MIN/1.73 M^2
GLUCOSE SERPL-MCNC: 97 MG/DL (ref 70–110)
HCT VFR BLD AUTO: 33.8 % (ref 37–48.5)
HGB BLD-MCNC: 10.9 G/DL (ref 12–16)
IMM GRANULOCYTES # BLD AUTO: 0.02 K/UL (ref 0–0.04)
IMM GRANULOCYTES NFR BLD AUTO: 0.3 % (ref 0–0.5)
LYMPHOCYTES # BLD AUTO: 1.5 K/UL (ref 1–4.8)
LYMPHOCYTES NFR BLD: 20.5 % (ref 18–48)
MCH RBC QN AUTO: 28.5 PG (ref 27–31)
MCHC RBC AUTO-ENTMCNC: 32.2 G/DL (ref 32–36)
MCV RBC AUTO: 88 FL (ref 82–98)
MONOCYTES # BLD AUTO: 0.7 K/UL (ref 0.3–1)
MONOCYTES NFR BLD: 10.2 % (ref 4–15)
NEUTROPHILS # BLD AUTO: 4.8 K/UL (ref 1.8–7.7)
NEUTROPHILS NFR BLD: 67.9 % (ref 38–73)
NRBC BLD-RTO: 0 /100 WBC
PLATELET # BLD AUTO: 271 K/UL (ref 150–450)
PMV BLD AUTO: 10.1 FL (ref 9.2–12.9)
POTASSIUM SERPL-SCNC: 3.6 MMOL/L (ref 3.5–5.1)
PROT SERPL-MCNC: 6.4 G/DL (ref 6–8.4)
RBC # BLD AUTO: 3.83 M/UL (ref 4–5.4)
SODIUM SERPL-SCNC: 139 MMOL/L (ref 136–145)
TROPONIN I SERPL-MCNC: <0.012 NG/ML (ref 0.01–0.03)
UUN UR-MCNC: 10 MG/DL (ref 7–17)
WBC # BLD AUTO: 7.13 K/UL (ref 3.9–12.7)

## 2024-05-26 PROCEDURE — 84484 ASSAY OF TROPONIN QUANT: CPT | Mod: ER | Performed by: STUDENT IN AN ORGANIZED HEALTH CARE EDUCATION/TRAINING PROGRAM

## 2024-05-26 PROCEDURE — 93010 ELECTROCARDIOGRAM REPORT: CPT | Mod: ,,, | Performed by: INTERNAL MEDICINE

## 2024-05-26 PROCEDURE — 93005 ELECTROCARDIOGRAM TRACING: CPT | Mod: ER

## 2024-05-26 PROCEDURE — 85025 COMPLETE CBC W/AUTO DIFF WBC: CPT | Mod: ER | Performed by: STUDENT IN AN ORGANIZED HEALTH CARE EDUCATION/TRAINING PROGRAM

## 2024-05-26 PROCEDURE — 99284 EMERGENCY DEPT VISIT MOD MDM: CPT | Mod: 25,ER

## 2024-05-26 PROCEDURE — 81025 URINE PREGNANCY TEST: CPT | Mod: ER | Performed by: STUDENT IN AN ORGANIZED HEALTH CARE EDUCATION/TRAINING PROGRAM

## 2024-05-26 PROCEDURE — 80053 COMPREHEN METABOLIC PANEL: CPT | Mod: ER | Performed by: STUDENT IN AN ORGANIZED HEALTH CARE EDUCATION/TRAINING PROGRAM

## 2024-05-26 NOTE — Clinical Note
"Priya Arevaloita" [place] was seen and treated in our emergency department on 5/26/2024.  She may return to work on 05/28/2024.       If you have any questions or concerns, please don't hesitate to call.      Filemon Marie MD"

## 2024-05-27 VITALS
DIASTOLIC BLOOD PRESSURE: 78 MMHG | OXYGEN SATURATION: 100 % | HEART RATE: 75 BPM | SYSTOLIC BLOOD PRESSURE: 145 MMHG | RESPIRATION RATE: 17 BRPM | HEIGHT: 65 IN | WEIGHT: 170 LBS | TEMPERATURE: 98 F | BODY MASS INDEX: 28.32 KG/M2

## 2024-05-27 LAB
OHS QRS DURATION: 88 MS
OHS QTC CALCULATION: 456 MS

## 2024-05-27 NOTE — ED NOTES
Review of patient's allergies indicates:  No Known Allergies     Patient has verified the spelling of the name and  on armband.   APPEARANCE: Patient is alert, calm, oriented x 4, and does not appear distressed.  SKIN: Skin is normal for race, warm, and dry. Normal skin turgor and mucous membranes moist.  CARDIAC: Normal rate and rhythm, no murmur heard. C/o chest pain after a coughing episode  RESPIRATORY:Normal rate and effort. Breath sounds clear bilaterally throughout chest. Respirations are equal and unlabored.  C/o a dry cough that has been going on for a few days. No coughing noted at this time.   GASTRO: Bowel sounds normal, abdomen is soft, no tenderness, and no abdominal distention.  MUSCLE: Full ROM. No bony tenderness or soft tissue tenderness. No obvious deformity.  PERIPHERAL VASCULAR: peripheral pulses present. Normal cap refill. No edema. Warm to touch.  NEURO: 5/5 strength major flexors/extensors bilaterally. Sensory intact to light touch bilaterally. Stacey coma scale: eyes open spontaneously-4, oriented & converses-5, obeys commands-6. No neurological abnormalities.   MENTAL STATUS: awake, alert and aware of environment.  EYE: No overt deficits noted. No drainage. Sclera WNL  ENT: EARS: no obvious drainage. NOSE: no active bleeding. THROAT: no redness or swelling.  : Voids without complication. Urine noted hazy.

## 2024-05-27 NOTE — DISCHARGE INSTRUCTIONS
Follow-up with your primary care doctor by calling for appointment.  Continue taking her regular home medications and return to the emergency department if condition worsens in any way.

## 2024-05-27 NOTE — ED PROVIDER NOTES
Encounter Date: 2024       History     Chief Complaint   Patient presents with    Leg Pain     Reports to ED c c/o R calf pain for months. States within this week a bruise appeared.     Cough     Also reports dry cough that began few days ago and causes her chest to hurt      HPI  38-year-old female no significant medical history presents brought in by self through triage for 2 complaints.  First complaint is bilateral leg cramping in the calves, much worse on the right than on the left, over the last 3 days.  She also notes that a week ago a bruise appeared over her right calf, which he does not remember being hit there.  She also reports chest pain, but only with pressing on her anterior chest wall.  She states that she is discussed this with family members and everyone is concerned with blood clots.    Review of patient's allergies indicates:  No Known Allergies  Past Medical History:   Diagnosis Date    Anxiety     Heart murmur     Hypertension     Migraine      Past Surgical History:   Procedure Laterality Date     SECTION      COLONOSCOPY  10/20/2021    COLONOSCOPY N/A 10/20/2021    Procedure: COLONOSCOPY 2 day golytely;  Surgeon: Zheng Woodruff MD;  Location: [facility];  Service: Endoscopy;  Laterality: N/A;    EPIDURAL STEROID INJECTION INTO CERVICAL SPINE N/A 2023    Procedure: C7-T1 LIZANDRO;  Surgeon: Eric Jessica MD;  Location: [facility] PAIN MANAGEMENT;  Service: Pain Management;  Laterality: N/A;  30 mins    EPIDURAL STEROID INJECTION INTO CERVICAL SPINE N/A 2023    Procedure: C7-T1 LIZANDRO;  Surgeon: Jose Metcalf DO;  Location: [facility] PAIN MANAGEMENT;  Service: Pain Management;  Laterality: N/A;  15 mins    ESOPHAGOGASTRODUODENOSCOPY N/A 10/20/2021    Procedure: EGD (ESOPHAGOGASTRODUODENOSCOPY);  Surgeon: Zheng Woodruff MD;  Location: [facility] ENDO;  Service: Endoscopy;  Laterality: N/A;    FLEXIBLE SIGMOIDOSCOPY N/A 3/4/2024    Procedure: SIGMOIDOSCOPY, FLEXIBLE;  Surgeon:  KOKO Naylor MD;  [facility];  Service: Colon and Rectal;  Laterality: N/A;    LAPAROSCOPIC APPENDECTOMY N/A 7/10/2023    Procedure: APPENDECTOMY, LAPAROSCOPIC;  Surgeon: Ki De La Fuente MD;  Location: [facility];  Service: General;  Laterality: N/A;    LAPAROSCOPIC TOTAL COLECTOMY N/A 3/4/2024    Procedure: COLECTOMY, TOTAL, LAPAROSCOPIC,ERAS low;  Surgeon: KOKO Naylor MD;  [facility];  Service: Colon and Rectal;  Laterality: N/A;    TUBAL LIGATION      Tummy Tuck      UPPER GASTROINTESTINAL ENDOSCOPY  10/20/2021     Family History   Problem Relation Name Age of Onset    Hypertension Mother      Breast cancer Paternal Aunt      Heart failure Maternal Grandmother      Heart failure Maternal Grandfather       Social History     Tobacco Use    Smoking status: Never    Smokeless tobacco: Never   Substance Use Topics    Alcohol use: Not Currently     Comment: socially    Drug use: No     Review of Systems  Complete review of systems was conducted and was negative except as per HPI and as marked    Physical Exam     Initial Vitals [05/26/24 2038]   BP Pulse Resp Temp SpO2   (!) 153/94 88 20 99.3 °F (37.4 °C) 99 %      MAP       --         Physical Exam  Physical  General: Awake, alert, no acute distress  Head: Normocephalic, atraumatic  Neck: Trachea midline, full range of motion, no meningismus  ENT: Atraumatic, Airway Patent  Cardio: Regular Rate, Regular Rhythm, Heart Sounds Normal, Capillary refill normal  Chest: Atraumatic, No respiratory distress no use of accessory muscles to breath, normal rate, sounds even and clear to auscultation  Abdomen: Soft, Nontender, no involuntary guarding, rigidity, or rebound.  Upper Ext: Atraumatic, Inspection normal, no swelling  Lower Ext:  Solitary approximately 2 in ecchymosis over the lateral right lower leg, appears several days old.  Otherwise  Atraumatic, Inspection normal, no swelling  Neuro: No gross cranial nerve abnormality,  no lateralization, no gross sensory or motor deficits  Abdomen: Soft, Nontender, no involuntary guarding, rigidity, or rebound.  ED Course   Procedures  Labs Reviewed   CBC W/ AUTO DIFFERENTIAL - Abnormal; Notable for the following components:       Result Value    RBC 3.83 (*)     Hemoglobin 10.9 (*)     Hematocrit 33.8 (*)     RDW 15.1 (*)     All other components within normal limits   COMPREHENSIVE METABOLIC PANEL - Abnormal; Notable for the following components:    Albumin 3.4 (*)     Anion Gap 6 (*)     All other components within normal limits   TROPONIN I   POCT URINE PREGNANCY        ECG Results              EKG 12-lead (Final result)        Collection Time Result Time QRS Duration OHS QTC Calculation    05/26/24 21:25:23 05/27/24 16:22:55 88 456                     Final result by Interface, Lab In [facility] (05/27/24 16:23:06)                   Narrative:    Test Reason : R07.9,    Vent. Rate : 079 BPM     Atrial Rate : 079 BPM     P-R Int : 150 ms          QRS Dur : 088 ms      QT Int : 398 ms       P-R-T Axes : 070 083 029 degrees     QTc Int : 456 ms    Normal sinus rhythm  Normal ECG  When compared with ECG of 02-MAY-2023 15:56,  Nonspecific T wave abnormality, improved in Inferior leads  Confirmed by Ok Bai MD ([id number]) on 5/27/2024 4:22:52 PM    Referred By: SEMAJ   SELF           Confirmed By:Ok Bai MD                                  Imaging Results              US Lower Extremity Veins Bilateral (Final result)  Result time 05/26/24 22:09:08      Final result by David Jung MD (05/26/24 22:09:08)                   Impression:      1.  No evidence of right or left deep venous thrombosis.      Electronically signed by: David Jung MD  Date:    05/26/2024  Time:    22:09               Narrative:    EXAMINATION:  US LOWER EXTREMITY VEINS BILATERAL    CLINICAL HISTORY:  Other specified soft tissue disorders    TECHNIQUE:  Real-time, color, and duplex evaluation of the deep  venous vessels in both lower extremities were performed.    COMPARISON:  No comparison studies are available.    FINDINGS:  No evidence of deep venous thrombosis in either lower extremity. The deep venous vessels demonstrate normal spontaneous and augmented flow with normal respiratory variation.  Deep venous vessels compress in a normal fashion throughout.                                       X-Ray Chest AP Portable (Final result)  Result time 05/26/24 21:44:13      Final result by David Jung MD (05/26/24 21:44:13)                   Impression:      1.  Negative for acute process involving the chest.    2.  Stable findings as noted above.      Electronically signed by: David Jung MD  Date:    05/26/2024  Time:    21:44               Narrative:    EXAMINATION:  XR CHEST AP PORTABLE    CLINICAL HISTORY:  Chest pain, unspecified    COMPARISON:  December 27, 2020    FINDINGS:  EKG leads overlie the chest.  The lungs are clear. The cardiac silhouette size is normal. The trachea is midline and the mediastinal width is normal. Negative for focal infiltrate, effusion or pneumothorax. Pulmonary vasculature is normal. Negative for osseous abnormalities. Tortuous aorta and degenerative changes of the spine again seen.                                       Medications - No data to display  Medical Decision Making  Amount and/or Complexity of Data Reviewed  Labs: ordered.  Radiology: ordered.    38-year-old female presents for multiple complaints, which are apparently unrelated.  In regards to the bruise, thrombocytopenia or other hematologic abnormalities considered, however she has not having any other symptomatology of abnormal bleeding that would suggest coagulopathy, CBC shows no thrombocytopenia for evidence of hematologic malignancy.  Reassured in this regard.            DVT was considered, ultrasound was done and does not demonstrate this.  Her history and examination is not in any way consistent with  pulmonary embolism, and risks associated with CT imaging in this otherwise healthy young person no outweigh the benefits.      After consideration of history, exam, and pertinent studies, risk stratification is done as such.    As is standard in the evaluation of chest pain, consideration was given to (but not limited to) EMCs: Aortic dissection, ACS, PE, and pneumothorax.    Pneumothorax excluded on the basis of Xray/imaging.    Presentation is clinically inconsistent with aortic dissection. ADDRS is 0 (no high risk pain features, high risk exam features, or high risk medical history). Chest x-ray without mediastinal widening. No indication for further evaluation for aortic dissection (risks associated with further evaluation including but not limited to contrast and radiation outweigh benefits).    Perc Negative. Lowest risk per wells score. No indication for further evaluation for PE, (risks associated with further evaluation including but not limited to contrast and radiation outweigh benefits). Heart score is 3 or less, or 4 or less with no known history of CAD. This has been extensively validated in scientific data to represent 30 risk of MACE at 1% or less, indicating risks associated with further ED/inpatient evaluation outweigh benefits and patient is stable for outpatient discharge with appropriate follow-up. All of this was discussed at length in laypersons terminology in my traditional manner to which patient verbalized understanding and all questions were answered. They are well appearing at time of discharge.                 Clinical Impression:  Final diagnoses:  [R07.9] Chest pain  [M79.89] Leg swelling  [R25.2] Leg cramping (Primary)  [T14.8XXA] Bruise          ED Disposition Condition    Discharge Stable          ED Prescriptions    None       Follow-up Information    None          Filemon Marie MD  05/27/24 [id number]

## 2024-05-28 ENCOUNTER — PATIENT MESSAGE (OUTPATIENT)
Dept: SURGERY | Facility: CLINIC | Age: 39
End: 2024-05-28
Payer: COMMERCIAL

## 2024-05-29 ENCOUNTER — PATIENT MESSAGE (OUTPATIENT)
Dept: NEUROSURGERY | Facility: CLINIC | Age: 39
End: 2024-05-29
Payer: COMMERCIAL

## 2024-05-29 ENCOUNTER — PATIENT MESSAGE (OUTPATIENT)
Dept: PAIN MEDICINE | Facility: CLINIC | Age: 39
End: 2024-05-29
Payer: COMMERCIAL

## 2024-05-29 ENCOUNTER — LAB VISIT (OUTPATIENT)
Dept: LAB | Facility: HOSPITAL | Age: 39
End: 2024-05-29
Attending: OBSTETRICS & GYNECOLOGY
Payer: COMMERCIAL

## 2024-05-29 DIAGNOSIS — Z00.00 ROUTINE MEDICAL EXAM: ICD-10-CM

## 2024-05-29 LAB
CHOLEST SERPL-MCNC: 200 MG/DL (ref 120–199)
CHOLEST/HDLC SERPL: 3.2 {RATIO} (ref 2–5)
HDLC SERPL-MCNC: 62 MG/DL (ref 40–75)
HDLC SERPL: 31 % (ref 20–50)
LDLC SERPL CALC-MCNC: 128.2 MG/DL (ref 63–159)
NONHDLC SERPL-MCNC: 138 MG/DL
TRIGL SERPL-MCNC: 49 MG/DL (ref 30–150)

## 2024-05-29 PROCEDURE — 80061 LIPID PANEL: CPT | Performed by: OBSTETRICS & GYNECOLOGY

## 2024-05-29 PROCEDURE — 36415 COLL VENOUS BLD VENIPUNCTURE: CPT | Mod: PN | Performed by: OBSTETRICS & GYNECOLOGY

## 2024-06-04 ENCOUNTER — PATIENT MESSAGE (OUTPATIENT)
Dept: SURGERY | Facility: CLINIC | Age: 39
End: 2024-06-04
Payer: COMMERCIAL

## 2024-06-04 DIAGNOSIS — M79.604 PAIN OF RIGHT LOWER EXTREMITY: Primary | ICD-10-CM

## 2024-06-04 DIAGNOSIS — K59.01 CONSTIPATION BY DELAYED COLONIC TRANSIT: Primary | ICD-10-CM

## 2024-06-05 ENCOUNTER — HOSPITAL ENCOUNTER (OUTPATIENT)
Dept: RADIOLOGY | Facility: HOSPITAL | Age: 39
Discharge: HOME | End: 2024-06-05
Attending: ORTHOPAEDIC SURGERY
Payer: COMMERCIAL

## 2024-06-05 ENCOUNTER — OFFICE VISIT (OUTPATIENT)
Dept: ORTHOPEDICS | Facility: CLINIC | Age: 39
End: 2024-06-05
Payer: COMMERCIAL

## 2024-06-05 ENCOUNTER — HOSPITAL ENCOUNTER (OUTPATIENT)
Dept: RADIOLOGY | Facility: HOSPITAL | Age: 39
Discharge: HOME | End: 2024-06-05
Attending: STUDENT IN AN ORGANIZED HEALTH CARE EDUCATION/TRAINING PROGRAM
Payer: COMMERCIAL

## 2024-06-05 DIAGNOSIS — M17.11 PRIMARY OSTEOARTHRITIS OF RIGHT KNEE: ICD-10-CM

## 2024-06-05 DIAGNOSIS — M54.16 RIGHT LUMBAR RADICULOPATHY: ICD-10-CM

## 2024-06-05 DIAGNOSIS — M54.16 RIGHT LUMBAR RADICULOPATHY: Primary | ICD-10-CM

## 2024-06-05 DIAGNOSIS — M79.604 PAIN OF RIGHT LOWER EXTREMITY: ICD-10-CM

## 2024-06-05 PROCEDURE — 99999 PR PBB SHADOW E&M-EST. PATIENT-LVL III: CPT | Mod: PBBFAC,,, | Performed by: ORTHOPAEDIC SURGERY

## 2024-06-05 PROCEDURE — 72100 X-RAY EXAM L-S SPINE 2/3 VWS: CPT | Mod: TC,FY

## 2024-06-05 PROCEDURE — 1159F MED LIST DOCD IN RCRD: CPT | Mod: CPTII,S$GLB,, | Performed by: ORTHOPAEDIC SURGERY

## 2024-06-05 PROCEDURE — 99204 OFFICE O/P NEW MOD 45 MIN: CPT | Mod: S$GLB,,, | Performed by: ORTHOPAEDIC SURGERY

## 2024-06-05 PROCEDURE — 73590 X-RAY EXAM OF LOWER LEG: CPT | Mod: 26,RT,, | Performed by: RADIOLOGY

## 2024-06-05 PROCEDURE — 73590 X-RAY EXAM OF LOWER LEG: CPT | Mod: TC,PN,RT

## 2024-06-05 PROCEDURE — 3044F HG A1C LEVEL LT 7.0%: CPT | Mod: CPTII,S$GLB,, | Performed by: ORTHOPAEDIC SURGERY

## 2024-06-05 PROCEDURE — 72100 X-RAY EXAM L-S SPINE 2/3 VWS: CPT | Mod: 26,,, | Performed by: RADIOLOGY

## 2024-06-05 NOTE — PROGRESS NOTES
[facility], Orthopedics and Sports Medicine  [facility]    New Patient Knee Office Visit  2024       Subjective:      Priya Conrad is a 38 y.o. female referred by Aaareferral Self for evaluation and treatment of bilateral knee pain. This is evaluated as a personal injury.  The patient has the following symptoms: pain located behind knee as well as in calf and stiffness.  The symptoms began 2 months ago and are unchanged.  The symptoms are located behind knees, but most of pain is located in calves.     Pain: Description: mild  Rest pain: no, occasional, and mild  Quality: throbbing and tight  Location: calf  Exacerbating factors: activity, standing, and walking    The patient is active in  walking. 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       Outside reports reviewed: ER records, historical medical records, and radiology reports.    Past Medical History:   Diagnosis Date    Anxiety     Heart murmur     Hypertension     Migraine        Patient Active Problem List   Diagnosis    Chronic migraine    Medication overuse headache    Muscle tightness    Cervical muscle pain    Bilateral shoulder pain    Poor posture    Essential hypertension    BMI 33.0-33.9,adult    GERD (gastroesophageal reflux disease)    Acute appendicitis    Slow transit constipation       Past Surgical History:   Procedure Laterality Date     SECTION      COLONOSCOPY  10/20/2021    COLONOSCOPY N/A 10/20/2021    Procedure: COLONOSCOPY 2 day golytely;  Surgeon: Zheng Woodruff MD;  Location: [facility];  Service: Endoscopy;  Laterality: N/A;    EPIDURAL STEROID INJECTION INTO CERVICAL SPINE N/A 2023    Procedure: C7-T1 LIZANDRO;  Surgeon: Eric Jessica MD;  Location: [facility] PAIN MANAGEMENT;  Service: Pain Management;  Laterality: N/A;  30 mins    EPIDURAL STEROID INJECTION INTO CERVICAL SPINE N/A 2023    Procedure: C7-T1 LIZANDRO;  Surgeon:  Jose Metcalf DO;  Location: [facility] PAIN MANAGEMENT;  Service: Pain Management;  Laterality: N/A;  15 mins    ESOPHAGOGASTRODUODENOSCOPY N/A 10/20/2021    Procedure: EGD (ESOPHAGOGASTRODUODENOSCOPY);  Surgeon: Zheng Woodruff MD;  Location: [facility] ENDO;  Service: Endoscopy;  Laterality: N/A;    FLEXIBLE SIGMOIDOSCOPY N/A 3/4/2024    Procedure: SIGMOIDOSCOPY, FLEXIBLE;  Surgeon: KOKO Naylor MD;  Location: [facility] OR 2ND FLR;  Service: Colon and Rectal;  Laterality: N/A;    LAPAROSCOPIC APPENDECTOMY N/A 7/10/2023    Procedure: APPENDECTOMY, LAPAROSCOPIC;  Surgeon: Ki De La Fuente MD;  Location: [facility] OR;  Service: General;  Laterality: N/A;    LAPAROSCOPIC TOTAL COLECTOMY N/A 3/4/2024    Procedure: COLECTOMY, TOTAL, LAPAROSCOPIC,ERAS low;  Surgeon: KOKO Naylor MD;  Location: [facility] OR 2ND FLR;  Service: Colon and Rectal;  Laterality: N/A;    TUBAL LIGATION      [facility]      UPPER GASTROINTESTINAL ENDOSCOPY  10/20/2021        Current Outpatient Medications   Medication Instructions    ALPRAZolam (XANAX) 0.5 mg, Oral, 2 times daily PRN    amLODIPine (NORVASC) 10 mg, Oral, 2 times daily    FLUoxetine 20 mg, Oral, Daily    fluticasone propionate (FLONASE) 100 mcg, Each Nostril, Daily    gabapentin (NEURONTIN) 600 mg, Oral, Nightly    hydrocortisone 2.5 % cream Topical (Top), 2 times daily    levocetirizine (XYZAL) 5 mg, Oral, Nightly    medroxyPROGESTERone (PROVERA) 10 mg, Oral, 2 times daily    pantoprazole (PROTONIX) 40 mg, Oral, Daily    propranoloL (INDERAL LA) 120 mg, Oral, Daily        Review of patient's allergies indicates:  No Known Allergies    Social History     Socioeconomic History    Marital status:     Number of children: 3   Occupational History    Occupation: medical assistant     Comment: [employer]   Tobacco Use    Smoking status: Never    Smokeless tobacco: Never   Substance and Sexual Activity    Alcohol use: Not Currently     Comment: socially    Drug use: No    Social History Narrative    , 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[employer]     Social Determinants of Health     Financial Resource Strain: Medium Risk (4/19/2024)    Overall Financial Resource Strain (CARDIA)     Difficulty of Paying Living Expenses: Somewhat hard   Food Insecurity: Food Insecurity Present (4/19/2024)    Hunger Vital Sign     Worried About Running Out of Food in the Last Year: Never true     Ran Out of Food in the Last Year: Sometimes true   Transportation Needs: No Transportation Needs (4/19/2024)    PRAPARE - Transportation     Lack of Transportation (Medical): No     Lack of Transportation (Non-Medical): No   Physical Activity: Insufficiently Active (4/19/2024)    Exercise Vital Sign     Days of Exercise per Week: 3 days     Minutes of Exercise per Session: 40 min   Stress: Stress Concern Present (4/19/2024)    [employer] - Occupational Stress Questionnaire     Feeling of Stress : Very much   Housing Stability: Unknown (3/5/2024)    Housing Stability Vital Sign     Unable to Pay for Housing in the Last Year: No     Unstable Housing in the Last Year: No       Family History   Problem Relation Name Age of Onset    Hypertension Mother      Breast cancer Paternal Aunt      Heart failure Maternal Grandmother      Heart failure Maternal Grandfather           Review of Systems   Constitutional: Negative for chills, decreased appetite, fever and night sweats.   Cardiovascular:  Negative for chest pain.   Respiratory:  Negative for shortness of breath and wheezing.    Skin:  Negative for dry skin, itching and rash.   Musculoskeletal:  Positive for joint pain, myalgias and stiffness. Negative for joint swelling, muscle cramps and muscle weakness.   Gastrointestinal:  Negative for abdominal pain, constipation and diarrhea.   Genitourinary:  Negative for dysuria and frequency.   Neurological:  Negative for dizziness, light-headedness and weakness.   Psychiatric/Behavioral:   Negative for depression. The patient is not nervous/anxious.             Objective:      General    Nursing note and vitals reviewed.  Constitutional: She appears well-developed and well-nourished. No distress.   HENT:   Head: Atraumatic.   Nose: Nose normal.   Eyes: EOM are normal. Pupils are equal, round, and reactive to light.   Neck: No JVD present.   Cardiovascular:  Normal rate and regular rhythm.            No murmur heard.  Pulmonary/Chest: Effort normal and breath sounds normal. No respiratory distress.   Abdominal: Soft.   Neurological: She is alert.   Psychiatric: She has a normal mood and affect.           Right Knee Exam   Right knee exam is normal.    Inspection   Erythema: absent  Swelling: absent  Effusion: absent    Tenderness   The patient is experiencing no tenderness.     Crepitus   The patient has crepitus of the patella.    Range of Motion   The patient has normal right knee ROM.  Extension:  normal   Flexion:  normal     Tests   Ligament Examination   Lachman: normal (-1 to 2mm)   PCL-Posterior Drawer: normal (0 to 2mm)     MCL - Valgus: normal (0 to 2mm)  LCL - Varus: normal    Left Knee Exam   Left knee exam is normal.    Inspection   Erythema: absent  Swelling: absent  Effusion: absent    Tenderness   The patient is experiencing no tenderness.     Crepitus   The patient has crepitus of the patella.    Range of Motion   The patient has normal left knee ROM.  Extension:  normal   Flexion:  normal     Tests   Stability   Lachman: normal (-1 to 2mm)   PCL-Posterior Drawer: normal (0 to 2mm)  MCL - Valgus: normal (0 to 2mm)  LCL - Varus: normal (0 to 2mm)Back (L-Spine & T-Spine) / Neck (C-Spine) Exam     Spinal Sensation   Right Side Sensation  C-Spine Level: normal   L-Spine Level: normal  T-Spine Level: normal  Left Side Sensation  C-Spine Level: normal  L-Spine Level: normal  T-Spine Level: normal      Muscle Strength   Right Lower Extremity   Quadriceps:  5/5   Hamstrin/5   Left Lower  Extremity   Quadriceps:  5/5   Hamstrin/5     Vascular Exam     Right Pulses  Dorsalis Pedis:      2+  Posterior Tibial:      2+        Left Pulses  Dorsalis Pedis:      2+  Posterior Tibial:      2+          Imaging:  Radiographs of the bilateral tibia/Fibula taken taken 2024 were personally [facility].  Multiple views are available today for review, including two view Tib/Fib. There is no evidence of fracture or dislocation. There is mild BL joint space disease in the knees.  No acute fractures or dislocations are noted in these images.       Radiographs of lumbar spine taken 2024 personally reviewed from [facility].  Notable for multiple views demonstrating degenerative changes mild in lumbar facet joints. Otherwise agree with read.         Assessment:       Priya Conrad is a 38 y.o. female seen in the office today. The primary encounter diagnosis was Right lumbar radiculopathy. A diagnosis of Primary osteoarthritis of right knee was also pertinent to this visit.  Non-operative treatment is recommended at this time though a broad differential was considered including muscular, vascular, and neurologic pathology. There is less concern regarding the latter two as pt with non-concerning exam findings today which is more consistent with musculoskeletal disease. Will try physical therapy for lumbar spine and knee.  The natural history and expected course discussed with patient. Various treatment options were discussed, including their risks and benefits. All of the patient's questions were answered.     Plan:      Physical therapy and rehabilitation treatment.  Tylenol 650mg TID, PRN pain.  Follow up in 6 weeks.          Malvin Crawford IV, MD   of Clinical Orthopedics  Department of Orthopedic Surgery  [facility]  Office: [phone]  Website: [email or web address]      Orders Placed This Encounter    X-Ray Lumbar Spine AP  And Lateral    Ambulatory referral/consult to Physical/Occupational Therapy

## 2024-06-06 ENCOUNTER — PATIENT MESSAGE (OUTPATIENT)
Dept: SURGERY | Facility: CLINIC | Age: 39
End: 2024-06-06
Payer: COMMERCIAL

## 2024-06-13 ENCOUNTER — OFFICE VISIT (OUTPATIENT)
Dept: CARDIOLOGY | Facility: CLINIC | Age: 39
End: 2024-06-13
Payer: COMMERCIAL

## 2024-06-13 VITALS
BODY MASS INDEX: 28.8 KG/M2 | WEIGHT: 173.06 LBS | DIASTOLIC BLOOD PRESSURE: 81 MMHG | SYSTOLIC BLOOD PRESSURE: 128 MMHG | HEART RATE: 98 BPM

## 2024-06-13 DIAGNOSIS — Z82.49 FAMILY HISTORY OF CARDIOVASCULAR DISEASE: ICD-10-CM

## 2024-06-13 DIAGNOSIS — I10 ESSENTIAL HYPERTENSION: ICD-10-CM

## 2024-06-13 DIAGNOSIS — R07.2 PRECORDIAL CHEST PAIN: Primary | ICD-10-CM

## 2024-06-13 PROCEDURE — 3044F HG A1C LEVEL LT 7.0%: CPT | Mod: CPTII,S$GLB,, | Performed by: INTERNAL MEDICINE

## 2024-06-13 PROCEDURE — 3008F BODY MASS INDEX DOCD: CPT | Mod: CPTII,S$GLB,, | Performed by: INTERNAL MEDICINE

## 2024-06-13 PROCEDURE — 99999 PR PBB SHADOW E&M-EST. PATIENT-LVL III: CPT | Mod: PBBFAC,,, | Performed by: INTERNAL MEDICINE

## 2024-06-13 PROCEDURE — 1159F MED LIST DOCD IN RCRD: CPT | Mod: CPTII,S$GLB,, | Performed by: INTERNAL MEDICINE

## 2024-06-13 PROCEDURE — 99203 OFFICE O/P NEW LOW 30 MIN: CPT | Mod: S$GLB,,, | Performed by: INTERNAL MEDICINE

## 2024-06-13 PROCEDURE — 3074F SYST BP LT 130 MM HG: CPT | Mod: CPTII,S$GLB,, | Performed by: INTERNAL MEDICINE

## 2024-06-13 PROCEDURE — 3079F DIAST BP 80-89 MM HG: CPT | Mod: CPTII,S$GLB,, | Performed by: INTERNAL MEDICINE

## 2024-06-13 RX ORDER — AMLODIPINE BESYLATE 10 MG/1
10 TABLET ORAL DAILY
Qty: 90 TABLET | Refills: 4 | Status: SHIPPED | OUTPATIENT
Start: 2024-06-13

## 2024-06-13 NOTE — PROGRESS NOTES
Subjective:   06/13/2024     Patient ID:  Priya Conrad is a 38 y.o. female who presents for evaulation of Chest Pain, Palpitations, BRCA Mutation, and Hypertension      Comes in today noting chest pain, this has been present for last several months, at it can occur with activity or at rest, it is described as an anterior chest pressure radiating to left arm.  Her family history is strongly positive for premature atherosclerosis.  The patient has never smoked cigarettes.  She does have hypertension, currently appears to be well controlled, she should be on amlodipine 10 mg daily, she would continue Inderal  daily.    The 10-year ASCVD risk score (Chelle VILLA, et al., 2019) is: 1.1%.  Cardiac risk appears low.    Values used to calculate the score:      Age: 38 years      Sex: Female      Is Non- : Yes      Diabetic: No      Tobacco smoker: No      Systolic Blood Pressure: 128 mmHg      Is BP treated: Yes      HDL Cholesterol: 62 mg/dL      Total Cholesterol: 200 mg/dL            Prior note reviewed:   HPI  The patient came in today for cardiac consult of Follow-up     1/27/21  Priya Conrad is a 35 y.o. female pt with HTN, murmur, anxiety, obesity presents fore preop CV eval.     ER visit last month - presents to the ED today with chest pain. EKG without signs of acute ACS, troponin normal, low HEART Score, delta troponin normal. No need for emergent cardiology evaluation today. No risk factors for PE. I doubt pulmonary embolism. CXR without signs of penumothorax, infectious etiology, pneumomediastinum. No PMH or FH for concerns for aortic dissection, no evidence of perfusion deficit, no widened mediastinum, unlikely to be aortic dissection. There is no need for admission at this time.      She had tested neg for COVID. She is taking PPI for GERD now. She will have liposuction here for preop with Dr. Peralta.      Patient feels no sob, no leg swelling, no PND, no palpitation, no  dizziness, no syncope, no CNS symptoms.     Patient has fairly good exercise tolerance.    Lab Results  Component  Value  Date     NA  140  12/27/2020     K  3.6  12/27/2020     CL  105  12/27/2020     CO2  29  12/27/2020     BUN  13  12/27/2020     CREATININE  0.85  12/27/2020     GLU  117 (H)  12/27/2020     HGBA1C  5.4  05/28/2020     AST  48 (H)  12/27/2020     AST  26  02/21/2016     ALT  59 (H)  12/27/2020     ALBUMIN  3.9  12/27/2020     PROT  7.3  12/27/2020     BILITOT  0.2  12/27/2020     WBC  6.08  12/27/2020     HGB  12.1  12/27/2020     HCT  37.5  12/27/2020     MCV  91  12/27/2020     PLT  285  12/27/2020     INR  1.1  02/21/2016     TSH  0.794  08/07/2020     CHOL  162  05/28/2020     HDL  54  05/28/2020     LDLCALC  98.4  05/28/2020     TRIG  48  05/28/2020     BNP  <11  02/21/2016     Assessment:     1.Preop cardiovascular exam   2.Essential hypertension   3.BMI 33.0-33.9,adult   4.Gastroesophageal reflux disease, unspecified whether esophagitis present   5.Chest pain, unspecified type         Plan:  1. HTN - well controlled   - cont meds and titrate     2. Obesity  - cont weight loss     3. GERD  - cont PPI     4. Chest pain, atypical  - order ECHO  - neg ECG     5 . Pre-OP CV evaluation prior to liposuction with  Dr. Peralta.   Low periop risk of CV events for low risk procedure.  Good functional and exercise capacity.  No active arrhythmia and CHF symptoms.  Continue propranolol periop.     Thank you for allowing me to participate in this patient's care. Please do not hesitate to contact me with any questions or concerns. Consult note has been forwarded to the referral physician.      Fabian Marin MD, [facility]  Cardiovascular Disease  [facility] [place]  [phone] (P)       Past Medical History:   Diagnosis Date    Anxiety     Heart murmur     Hypertension     Migraine        Review of patient's allergies indicates:  No Known Allergies      Current Outpatient Medications:      ALPRAZolam (XANAX) 0.5 MG tablet, Take 0.5 mg by mouth 2 (two) times daily as needed., Disp: , Rfl:     fluticasone propionate (FLONASE) 50 mcg/actuation nasal spray, 2 sprays (100 mcg total) by Each Nostril route once daily., Disp: 9.9 mL, Rfl: 11    gabapentin (NEURONTIN) 300 MG capsule, Take 600 mg by mouth every evening., Disp: , Rfl:     levocetirizine (XYZAL) 5 MG tablet, Take 1 tablet (5 mg total) by mouth every evening., Disp: 30 tablet, Rfl: 11    pantoprazole (PROTONIX) 40 MG tablet, Take 1 tablet (40 mg total) by mouth once daily., Disp: 30 tablet, Rfl: 3    propranoloL (INDERAL LA) 120 MG 24 hr capsule, Take 120 mg by mouth once daily., Disp: , Rfl:     amLODIPine (NORVASC) 10 MG tablet, Take 1 tablet (10 mg total) by mouth once daily., Disp: 90 tablet, Rfl: 4     Objective:   Review of Systems   Cardiovascular:  Positive for chest pain. Negative for claudication, cyanosis, dyspnea on exertion, irregular heartbeat, leg swelling, near-syncope, orthopnea, palpitations, paroxysmal nocturnal dyspnea and syncope.         Vitals:    06/13/24 1304   BP: 128/81   Pulse: 98     Wt Readings from Last 3 Encounters:   06/13/24 78.5 kg (173 lb 1 oz)   05/26/24 77.1 kg (170 lb)   05/17/24 81.2 kg (179 lb)     Temp Readings from Last 3 Encounters:   05/27/24 98.2 °F (36.8 °C) (Oral)   03/11/24 98 °F (36.7 °C)   03/09/24 97.8 °F (36.6 °C) (Oral)     BP Readings from Last 3 Encounters:   06/13/24 128/81   05/27/24 (!) 145/78   05/17/24 (!) 156/103     Pulse Readings from Last 3 Encounters:   06/13/24 98   05/27/24 75   05/17/24 79             Physical Exam  Vitals reviewed.   Constitutional:       General: She is not in acute distress.     Appearance: She is well-developed.   HENT:      Head: Normocephalic and atraumatic.      Nose: Nose normal.   Eyes:      Conjunctiva/sclera: Conjunctivae normal.      Pupils: Pupils are equal, round, and reactive to light.   Neck:      Vascular: No carotid bruit or JVD.    Cardiovascular:      Rate and Rhythm: Normal rate and regular rhythm.      Pulses: Normal pulses and intact distal pulses.      Heart sounds: Normal heart sounds. No murmur heard.     No friction rub. No gallop.   Pulmonary:      Effort: Pulmonary effort is normal. No respiratory distress.      Breath sounds: Normal breath sounds. No wheezing or rales.   Chest:      Chest wall: No tenderness.   Abdominal:      General: Bowel sounds are normal. There is no distension.      Palpations: Abdomen is soft.      Tenderness: There is no abdominal tenderness.   Musculoskeletal:         General: No tenderness or deformity. Normal range of motion.      Cervical back: Normal range of motion and neck supple.      Right lower leg: No edema.      Left lower leg: No edema.   Skin:     General: Skin is warm and dry.      Findings: No erythema or rash.   Neurological:      Mental Status: She is alert and oriented to person, place, and time.      Cranial Nerves: No cranial nerve deficit.      Motor: No abnormal muscle tone.      Coordination: Coordination normal.   Psychiatric:         Behavior: Behavior normal.         Thought Content: Thought content normal.         Judgment: Judgment normal.           Lab Results   Component Value Date    CHOL 200 (H) 05/29/2024    CHOL 162 05/28/2020     Lab Results   Component Value Date    HDL 62 05/29/2024    HDL 54 05/28/2020     Lab Results   Component Value Date    LDLCALC 128.2 05/29/2024    LDLCALC 98.4 05/28/2020     Lab Results   Component Value Date    ALT 35 05/26/2024    AST 34 05/26/2024    AST 30 05/14/2024    AST 22 03/11/2024     Lab Results   Component Value Date    CREATININE 0.77 05/26/2024    BUN 10 05/26/2024     05/26/2024    K 3.6 05/26/2024    CO2 27 05/26/2024    CO2 31 (H) 05/14/2024    CO2 32 (H) 03/11/2024     Lab Results   Component Value Date    HGB 10.9 (L) 05/26/2024    HCT 33.8 (L) 05/26/2024    HCT 41.2 05/14/2024    HCT 42.4 05/03/2024                          Assessment and Plan:     Precordial chest pain  Comments:  Stress ECHO to be obtained  Orders:  -     Stress Echo Which stress agent will be used? Treadmill Exercise; Color Flow Doppler? No; Future    Essential hypertension  Comments:  If blood pressure would has on amlodipine 10 and Inderal , would add low-dose spironolactone, she does have hypo kalemia.  Orders:  -     amLODIPine (NORVASC) 10 MG tablet; Take 1 tablet (10 mg total) by mouth once daily.  Dispense: 90 tablet; Refill: 4    Family history of cardiovascular disease         No follow-ups on file.          Future Appointments   Date Time Provider Department Center   6/18/2024 11:30 AM Lo Nguyen PA-C [facility]GE [facility]   7/18/2024 10:45 AM Malvin Crawford IV, MD [id number]   7/19/2024  9:00 [facility]   7/19/2024  2:30 PM CV [facility] E[facility] [place]   7/24/2024  9:30 AM Gracy Doe PT, BCB-PMD [facility]   9/20/2024  9:20 AM Ada Weldon MD [facility] CHRIST [facility]

## 2024-06-18 ENCOUNTER — OFFICE VISIT (OUTPATIENT)
Dept: SURGERY | Facility: CLINIC | Age: 39
End: 2024-06-18
Payer: COMMERCIAL

## 2024-06-18 VITALS
DIASTOLIC BLOOD PRESSURE: 97 MMHG | WEIGHT: 170 LBS | BODY MASS INDEX: 28.32 KG/M2 | HEART RATE: 70 BPM | SYSTOLIC BLOOD PRESSURE: 143 MMHG | HEIGHT: 65 IN

## 2024-06-18 DIAGNOSIS — Z12.39 SCREENING BREAST EXAMINATION: ICD-10-CM

## 2024-06-18 DIAGNOSIS — N60.42 MAMMARY DUCT ECTASIA OF LEFT BREAST: ICD-10-CM

## 2024-06-18 DIAGNOSIS — N64.4 BREAST PAIN: Primary | ICD-10-CM

## 2024-06-18 PROCEDURE — 1160F RVW MEDS BY RX/DR IN RCRD: CPT | Mod: CPTII,S$GLB,, | Performed by: PHYSICIAN ASSISTANT

## 2024-06-18 PROCEDURE — 3077F SYST BP >= 140 MM HG: CPT | Mod: CPTII,S$GLB,, | Performed by: PHYSICIAN ASSISTANT

## 2024-06-18 PROCEDURE — 99999 PR PBB SHADOW E&M-EST. PATIENT-LVL III: CPT | Mod: PBBFAC,,, | Performed by: PHYSICIAN ASSISTANT

## 2024-06-18 PROCEDURE — 3008F BODY MASS INDEX DOCD: CPT | Mod: CPTII,S$GLB,, | Performed by: PHYSICIAN ASSISTANT

## 2024-06-18 PROCEDURE — 1159F MED LIST DOCD IN RCRD: CPT | Mod: CPTII,S$GLB,, | Performed by: PHYSICIAN ASSISTANT

## 2024-06-18 PROCEDURE — 3080F DIAST BP >= 90 MM HG: CPT | Mod: CPTII,S$GLB,, | Performed by: PHYSICIAN ASSISTANT

## 2024-06-18 PROCEDURE — 99214 OFFICE O/P EST MOD 30 MIN: CPT | Mod: S$GLB,,, | Performed by: PHYSICIAN ASSISTANT

## 2024-06-18 PROCEDURE — 3044F HG A1C LEVEL LT 7.0%: CPT | Mod: CPTII,S$GLB,, | Performed by: PHYSICIAN ASSISTANT

## 2024-06-18 NOTE — PROGRESS NOTES
[facility]  Department of Surgery    PCP:  Vinny Rose MD  CHIEF COMPLAINT: left breast pain    Subjective:      Priya Conrad is a 38 y.o. premenopausal female referred for evaluation of breast pain. Change was noted a few months ago.  Patient does routinely do self breast exams.  Patient has noted a change on breast exam.  Patient denies nipple discharge. Patient denies to previous breast biopsy. Patient denies a personal history of breast cancer.    Interval History 24:  Patient presents for routine follow up of her lateral left breast pain. Patient states she has tried the primrose oil without relief of her sxs. Still occur daily. She also notes that her cycles remain irregular. She is planning to start OCP's when her next cycle comes in hopes of managing. Otherwise no new breast complaints such as new masses or skin changes.     GYN History:  Age of menarche was 11. LMP: current. Patient denies hormonal therapy. Patient is . Age of first live birth was 17.  Patient did breast feed 2nd child for 1 month.    FAMILY history:  Paternal Aunt:     Past Medical History:   Diagnosis Date    Anxiety     Heart murmur     Hypertension     Migraine      Past Surgical History:   Procedure Laterality Date     SECTION      COLONOSCOPY  10/20/2021    COLONOSCOPY N/A 10/20/2021    Procedure: COLONOSCOPY 2 day golytely;  Surgeon: Zheng Woodruff MD;  Location: [facility];  Service: Endoscopy;  Laterality: N/A;    EPIDURAL STEROID INJECTION INTO CERVICAL SPINE N/A 2023    Procedure: C7-T1 LIZANDRO;  Surgeon: Eric Jessica MD;  Location: [facility] PAIN MANAGEMENT;  Service: Pain Management;  Laterality: N/A;  30 mins    EPIDURAL STEROID INJECTION INTO CERVICAL SPINE N/A 2023    Procedure: C7-T1 LIZANDRO;  Surgeon: Jose Metcalf DO;  Location: [facility] PAIN [facility]GEMENT;  Service: Pain Management;  Laterality: N/A;  15 mins    ESOPHAGOGASTRODUODENOSCOPY N/A 10/20/2021    Procedure: EGD  (ESOPHAGOGASTRODUODENOSCOPY);  Surgeon: Zheng Woodruff MD;  Location: [facility] ENDO;  Service: Endoscopy;  Laterality: N/A;    FLEXIBLE SIGMOIDOSCOPY N/A 3/4/2024    Procedure: SIGMOIDOSCOPY, FLEXIBLE;  Surgeon: KOKO Naylor MD;  Location: [facility] OR 2ND FLR;  Service: Colon and Rectal;  Laterality: N/A;    LAPAROSCOPIC APPENDECTOMY N/A 7/10/2023    Procedure: APPENDECTOMY, LAPAROSCOPIC;  Surgeon: Ki De La Fuente MD;  Location: [facility] OR;  Service: General;  Laterality: N/A;    LAPAROSCOPIC TOTAL COLECTOMY N/A 3/4/2024    Procedure: COLECTOMY, TOTAL, LAPAROSCOPIC,ERAS low;  Surgeon: KOKO Naylor MD;  Location: [facility] OR 2ND FLR;  Service: Colon and Rectal;  Laterality: N/A;    TUBAL LIGATION      Tummy Tuck      UPPER GASTROINTESTINAL ENDOSCOPY  10/20/2021     Current Outpatient Medications on File Prior to Visit   Medication Sig Dispense Refill    ALPRAZolam (XANAX) 0.5 MG tablet Take 0.5 mg by mouth 2 (two) times daily as needed.      amLODIPine (NORVASC) 10 MG tablet Take 1 tablet (10 mg total) by mouth once daily. 90 tablet 4    fluticasone propionate (FLONASE) 50 mcg/actuation nasal spray 2 sprays (100 mcg total) by Each Nostril route once daily. 9.9 mL 11    gabapentin (NEURONTIN) 300 MG capsule Take 600 mg by mouth every evening.      levocetirizine (XYZAL) 5 MG tablet Take 1 tablet (5 mg total) by mouth every evening. 30 tablet 11    pantoprazole (PROTONIX) 40 MG tablet Take 1 tablet (40 mg total) by mouth once daily. 30 tablet 3    propranoloL (INDERAL LA) 120 MG 24 hr capsule Take 120 mg by mouth once daily.       No current facility-administered medications on file prior to visit.     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[place]     Social Determinants of Health     Financial Resource Strain: Medium Risk (4/19/2024)    Overall Financial Resource Strain (CARDIA)     Difficulty of Paying Living Expenses: Somewhat hard   Food Insecurity: Food Insecurity Present (4/19/2024)    Hunger Vital Sign     Worried About Running Out of Food in the Last Year: Never true     Ran Out of Food in the Last Year: Sometimes true   Transportation Needs: No Transportation Needs (4/19/2024)    PRAPARE - Transportation     Lack of Transportation (Medical): No     Lack of Transportation (Non-Medical): No   Physical Activity: Insufficiently Active (4/19/2024)    Exercise Vital Sign     Days of Exercise per Week: 3 days     Minutes of Exercise per Session: 40 min   Stress: Stress Concern Present (4/19/2024)    [employer] - Occupational Stress Questionnaire     Feeling of Stress : Very much   Housing Stability: Unknown (3/5/2024)    Housing Stability Vital Sign     Unable to Pay for Housing in the Last Year: No     Unstable Housing in the Last Year: No     Family History   Problem Relation Name Age of Onset    Hypertension Mother      Breast cancer Paternal Aunt      Heart failure Maternal Grandmother      Heart failure Maternal Grandfather         Review of Systems  Review of Systems   Constitutional:  Negative for chills, fever and malaise/fatigue.   HENT:  Negative for congestion.    Eyes:  Negative for discharge.   Respiratory:  Negative for cough, shortness of breath and stridor.    Cardiovascular:  Negative for chest pain and palpitations.   Gastrointestinal:  Negative for abdominal pain and nausea.   Neurological:  Negative for headaches.   Psychiatric/Behavioral:  The patient is not nervous/anxious.           Objective:        BP (!) 143/97   Pulse 70   Ht 5' 5" (1.651 m)   Wt 77.1 kg (170 lb)   LMP 05/16/2024 (Exact " Date)   BMI 28.29 kg/m²   Physical Exam   Vitals reviewed.  Constitutional: She is oriented to person, place, and time.   HENT:   Head: Normocephalic and atraumatic.   Nose: Nose normal.   Eyes: Pupils are equal, round, and reactive to light. Right eye exhibits no discharge. Left eye exhibits no discharge.   Pulmonary/Chest: Effort normal and breath sounds normal. No stridor. No respiratory distress. She exhibits no mass, no tenderness and no edema. Right breast exhibits no inverted nipple, no mass, no nipple discharge, no skin change and no tenderness. Left breast exhibits no inverted nipple, no mass, no nipple discharge, no skin change and no tenderness. No breast swelling or bleeding. Breasts are symmetrical.   Abdominal: Normal appearance.   Genitourinary: No breast swelling or bleeding.   Neurological: She is alert and oriented to person, place, and time.   Skin: Skin is warm and dry.     Psychiatric: Her behavior is normal. Mood, judgment and thought content normal.         Radiology review: Images personally reviewed by me in the clinic.    4/17/24 Bilateral Screening Mammo:  Findings:  This procedure was performed using tomosynthesis. Computer-aided detection was utilized in the interpretation of this examination.  The breasts are heterogeneously dense, which may obscure small masses.      Left  There is an asymmetry seen in the outer region of the left breast in the anterior depth on the CC view.      Right  There is no evidence of suspicious masses, calcifications, or other abnormal findings in the right breast.     Impression:  Left  Asymmetry: Left breast asymmetry at the outer anterior position. Assessment: 0 - Incomplete. Diagnostic Mammogram and/or Ultrasound is recommended.      Right  There is no mammographic evidence of malignancy in the right breast.     BI-RADS Category:   Overall: 0 - Incomplete: Needs Additional Imaging Evaluation        Recommendation:  Diagnostic mammogram with possible  ultrasound (if indicated) is recommended.      4/30/24 Left Diagnostic Mammo/US Left:    Findings:  This procedure was performed using tomosynthesis. Computer-aided detection was utilized in the interpretation of this examination.  Mammo Digital Diagnostic Left with Umang  The left breast is heterogeneously dense, which may obscure small masses.  There is focal asymmetry seen in the outer region of the left breast in the anterior depth.      US Breast Left Limited  There is duct ectasia seen in the outer region of the left breast at 4 o'clock, 2 cm from the nipple. The duct ectasia correlates with the mammogram finding.  No evidence of suspicious masses.     Impression:  Mammo Digital Diagnostic Left with Umang  There is no mammographic evidence of malignancy in the left breast.     US Breast Left Limited  There is no sonographic evidence of malignancy in the left breast.     BI-RADS Category:   Overall: 2 - Benign        Recommendation:  Routine screening mammogram in 1 year is recommended.    Assessment:      Priya Conrad is a 38 y.o. 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      Plan:   We discussed the options for management of breast pain.    Discussed continue with OTC therapies such as acetaminophen or nonsteroidal anti-inflammatory drugs (NSAIDs). They can both be used to relieve breast pain. Topical NSAIDS such as OTC Diclofenac (Volteran) gel can be used. Other recommendations include use of a supportive bra. Wearing a soft, supportive bra at night prevents the breasts from pulling down on the chest wall. Some women obtain relief from application of warm compresses or ice packs. Also, referred for professional fitting of a supportive bra.      Discussed lifestyle recommendations such as decrease or elimination of caffeine. Also low-fat diet, high complex carbohydrate diet has been shown to be effective.      Patient has tried Evening Primerose Oil (EPO)  1000mg twice daily for the last two months without change in sxs. She is planning on trying Voltaren now for symptomatic treatment. Discussed that the OCP's may also help as I question whether this issue is hormonal.     Patient will be in touch next month once she has started the new medication about any possible improvements.     The patient is in agreement with the plan. Questions were encouraged and answered to patient's satisfaction. Priya will call our office with any questions or concerns.

## 2024-07-19 ENCOUNTER — HOSPITAL ENCOUNTER (OUTPATIENT)
Dept: CARDIOLOGY | Facility: HOSPITAL | Age: 39
Discharge: HOME | End: 2024-07-19
Attending: INTERNAL MEDICINE
Payer: MEDICAID

## 2024-07-19 DIAGNOSIS — R07.2 PRECORDIAL CHEST PAIN: ICD-10-CM

## 2024-07-19 LAB
ASCENDING AORTA: 2.22 CM
CV ECHO LV RWT: 0.4 CM
CV STRESS BASE HR: 71 BPM
DIASTOLIC BLOOD PRESSURE: 78 MMHG
DOP CALC LVOT AREA: 3.4 CM2
DOP CALC LVOT DIAMETER: 2.09 CM
DOP CALC LVOT PEAK VEL: 0.86 M/S
DOP CALC LVOT STROKE VOLUME: 58.5 CM3
DOP CALCLVOT PEAK VEL VTI: 17.06 CM
E WAVE DECELERATION TIME: 235.01 MSEC
E/A RATIO: 2.15
E/E' RATIO: 9.43 M/S
ECHO LV POSTERIOR WALL: 0.8 CM (ref 0.6–1.1)
FRACTIONAL SHORTENING: 30 % (ref 28–44)
INTERVENTRICULAR SEPTUM: 0.79 CM (ref 0.6–1.1)
IVRT: 51.38 MSEC
LA MAJOR: 5.18 CM
LA MINOR: 5 CM
LA WIDTH: 4.13 CM
LEFT ATRIUM SIZE: 3.16 CM
LEFT ATRIUM VOLUME MOD: 48.33 CM3
LEFT ATRIUM VOLUME: 56.45 CM3
LEFT INTERNAL DIMENSION IN SYSTOLE: 2.78 CM (ref 2.1–4)
LEFT VENTRICLE DIASTOLIC VOLUME: 68.76 ML
LEFT VENTRICLE SYSTOLIC VOLUME: 29.09 ML
LEFT VENTRICULAR INTERNAL DIMENSION IN DIASTOLE: 3.97 CM (ref 3.5–6)
LEFT VENTRICULAR MASS: 91.54 G
LV LATERAL E/E' RATIO: 9.9 M/S
LV SEPTAL E/E' RATIO: 9 M/S
MV A" WAVE DURATION": 10.28 MSEC
MV PEAK A VEL: 0.46 M/S
MV PEAK E VEL: 0.99 M/S
MV STENOSIS PRESSURE HALF TIME: 68.15 MS
MV VALVE AREA P 1/2 METHOD: 3.23 CM2
OHS CV CPX 1 MINUTE RECOVERY HEART RATE: 115 BPM
OHS CV CPX 85 PERCENT MAX PREDICTED HEART RATE MALE: 153
OHS CV CPX ESTIMATED METS: 9
OHS CV CPX MAX PREDICTED HEART RATE: 180
OHS CV CPX PATIENT IS FEMALE: 1
OHS CV CPX PATIENT IS MALE: 0
OHS CV CPX PEAK DIASTOLIC BLOOD PRESSURE: 90 MMHG
OHS CV CPX PEAK HEAR RATE: 173 BPM
OHS CV CPX PEAK RATE PRESSURE PRODUCT: ABNORMAL
OHS CV CPX PEAK SYSTOLIC BLOOD PRESSURE: 183 MMHG
OHS CV CPX PERCENT MAX PREDICTED HEART RATE ACHIEVED: 101
OHS CV CPX RATE PRESSURE PRODUCT PRESENTING: 9514
PISA TR MAX VEL: 2.26 M/S
PULM VEIN S/D RATIO: 1.31
PV PEAK D VEL: 0.39 M/S
PV PEAK S VEL: 0.51 M/S
RA MAJOR: 4.62 CM
RA PRESSURE ESTIMATED: 3 MMHG
RA WIDTH: 3.52 CM
RIGHT VENTRICLE DIASTOLIC BASEL DIMENSION: 3.1 CM
RV TB RVSP: 5 MMHG
SINUS: 2.56 CM
STJ: 2.21 CM
STRESS ECHO POST EXERCISE DUR MIN: 7 MINUTES
STRESS ECHO POST EXERCISE DUR SEC: 4 SECONDS
SYSTOLIC BLOOD PRESSURE: 134 MMHG
TDI LATERAL: 0.1 M/S
TDI SEPTAL: 0.11 M/S
TDI: 0.11 M/S
TR MAX PG: 20 MMHG
TRICUSPID ANNULAR PLANE SYSTOLIC EXCURSION: 2.45 CM
TV REST PULMONARY ARTERY PRESSURE: 23 MMHG

## 2024-07-19 PROCEDURE — 93351 STRESS TTE COMPLETE: CPT | Mod: 26,,, | Performed by: INTERNAL MEDICINE

## 2024-07-19 PROCEDURE — 93351 STRESS TTE COMPLETE: CPT

## 2024-08-29 ENCOUNTER — OFFICE VISIT (OUTPATIENT)
Dept: SURGERY | Facility: CLINIC | Age: 39
End: 2024-08-29
Payer: MEDICAID

## 2024-08-29 ENCOUNTER — HOSPITAL ENCOUNTER (EMERGENCY)
Facility: HOSPITAL | Age: 39
Discharge: HOME | End: 2024-08-29
Payer: MEDICAID

## 2024-08-29 VITALS
BODY MASS INDEX: 29.09 KG/M2 | SYSTOLIC BLOOD PRESSURE: 138 MMHG | DIASTOLIC BLOOD PRESSURE: 92 MMHG | HEIGHT: 65 IN | WEIGHT: 174.63 LBS | HEART RATE: 75 BPM

## 2024-08-29 VITALS
OXYGEN SATURATION: 100 % | HEART RATE: 61 BPM | RESPIRATION RATE: 20 BRPM | WEIGHT: 179 LBS | TEMPERATURE: 98 F | SYSTOLIC BLOOD PRESSURE: 157 MMHG | DIASTOLIC BLOOD PRESSURE: 99 MMHG | BODY MASS INDEX: 29.82 KG/M2 | HEIGHT: 65 IN

## 2024-08-29 DIAGNOSIS — R14.0 ABDOMINAL BLOATING: ICD-10-CM

## 2024-08-29 DIAGNOSIS — R19.7 DIARRHEA, UNSPECIFIED TYPE: ICD-10-CM

## 2024-08-29 DIAGNOSIS — A09 DIARRHEA OF INFECTIOUS ORIGIN: Primary | ICD-10-CM

## 2024-08-29 DIAGNOSIS — R11.0 NAUSEA: Primary | ICD-10-CM

## 2024-08-29 LAB
ALBUMIN SERPL BCP-MCNC: 4 G/DL (ref 3.5–5.2)
ALP SERPL-CCNC: 69 U/L (ref 38–126)
ALT SERPL W/O P-5'-P-CCNC: 20 U/L (ref 10–44)
ANION GAP SERPL CALC-SCNC: 11 MMOL/L (ref 8–16)
AST SERPL-CCNC: 29 U/L (ref 15–46)
BASOPHILS # BLD AUTO: 0.04 K/UL (ref 0–0.2)
BASOPHILS NFR BLD: 0.6 % (ref 0–1.9)
BILIRUB SERPL-MCNC: 0.4 MG/DL (ref 0.1–1)
BILIRUB UR QL STRIP: NEGATIVE
CALCIUM SERPL-MCNC: 9.2 MG/DL (ref 8.7–10.5)
CHLORIDE SERPL-SCNC: 104 MMOL/L (ref 95–110)
CLARITY UR REFRACT.AUTO: ABNORMAL
CO2 SERPL-SCNC: 27 MMOL/L (ref 23–29)
COLOR UR AUTO: YELLOW
CREAT SERPL-MCNC: 0.88 MG/DL (ref 0.5–1.4)
DIFFERENTIAL METHOD BLD: ABNORMAL
EOSINOPHIL # BLD AUTO: 0.1 K/UL (ref 0–0.5)
EOSINOPHIL NFR BLD: 1.3 % (ref 0–8)
ERYTHROCYTE [DISTWIDTH] IN BLOOD BY AUTOMATED COUNT: 15.8 % (ref 11.5–14.5)
EST. GFR  (NO RACE VARIABLE): >60 ML/MIN/1.73 M^2
GLUCOSE SERPL-MCNC: 89 MG/DL (ref 70–110)
GLUCOSE UR QL STRIP: NEGATIVE
HCT VFR BLD AUTO: 38.8 % (ref 37–48.5)
HGB BLD-MCNC: 12.6 G/DL (ref 12–16)
HGB UR QL STRIP: NEGATIVE
IMM GRANULOCYTES # BLD AUTO: 0.02 K/UL (ref 0–0.04)
IMM GRANULOCYTES NFR BLD AUTO: 0.3 % (ref 0–0.5)
KETONES UR QL STRIP: ABNORMAL
LEUKOCYTE ESTERASE UR QL STRIP: NEGATIVE
LIPASE SERPL-CCNC: 95 U/L (ref 23–300)
LYMPHOCYTES # BLD AUTO: 1.8 K/UL (ref 1–4.8)
LYMPHOCYTES NFR BLD: 29.6 % (ref 18–48)
MCH RBC QN AUTO: 29.2 PG (ref 27–31)
MCHC RBC AUTO-ENTMCNC: 32.5 G/DL (ref 32–36)
MCV RBC AUTO: 90 FL (ref 82–98)
MONOCYTES # BLD AUTO: 0.6 K/UL (ref 0.3–1)
MONOCYTES NFR BLD: 9.9 % (ref 4–15)
NEUTROPHILS # BLD AUTO: 3.6 K/UL (ref 1.8–7.7)
NEUTROPHILS NFR BLD: 58.3 % (ref 38–73)
NITRITE UR QL STRIP: NEGATIVE
NRBC BLD-RTO: 0 /100 WBC
OB PNL STL: POSITIVE
PH UR STRIP: 6 [PH] (ref 5–8)
PLATELET # BLD AUTO: 256 K/UL (ref 150–450)
PMV BLD AUTO: 10.2 FL (ref 9.2–12.9)
POTASSIUM SERPL-SCNC: 4.1 MMOL/L (ref 3.5–5.1)
PROT SERPL-MCNC: 7.3 G/DL (ref 6–8.4)
PROT UR QL STRIP: NEGATIVE
RBC # BLD AUTO: 4.32 M/UL (ref 4–5.4)
SODIUM SERPL-SCNC: 142 MMOL/L (ref 136–145)
SP GR UR STRIP: 1.01 (ref 1–1.03)
URN SPEC COLLECT METH UR: ABNORMAL
UROBILINOGEN UR STRIP-ACNC: NEGATIVE EU/DL
UUN UR-MCNC: 9 MG/DL (ref 7–17)
WBC # BLD AUTO: 6.18 K/UL (ref 3.9–12.7)

## 2024-08-29 PROCEDURE — 99213 OFFICE O/P EST LOW 20 MIN: CPT | Mod: S$PBB,,, | Performed by: COLON & RECTAL SURGERY

## 2024-08-29 PROCEDURE — 3080F DIAST BP >= 90 MM HG: CPT | Mod: CPTII,,, | Performed by: COLON & RECTAL SURGERY

## 2024-08-29 PROCEDURE — 1160F RVW MEDS BY RX/DR IN RCRD: CPT | Mod: CPTII,,, | Performed by: COLON & RECTAL SURGERY

## 2024-08-29 PROCEDURE — 99999 PR PBB SHADOW E&M-EST. PATIENT-LVL III: CPT | Mod: PBBFAC,,, | Performed by: COLON & RECTAL SURGERY

## 2024-08-29 PROCEDURE — 87046 STOOL CULTR AEROBIC BACT EA: CPT | Mod: ER | Performed by: PHYSICIAN ASSISTANT

## 2024-08-29 PROCEDURE — 83690 ASSAY OF LIPASE: CPT | Mod: ER | Performed by: PHYSICIAN ASSISTANT

## 2024-08-29 PROCEDURE — 3008F BODY MASS INDEX DOCD: CPT | Mod: CPTII,,, | Performed by: COLON & RECTAL SURGERY

## 2024-08-29 PROCEDURE — 63600175 PHARM REV CODE 636 W HCPCS: Mod: ER | Performed by: PHYSICIAN ASSISTANT

## 2024-08-29 PROCEDURE — 3044F HG A1C LEVEL LT 7.0%: CPT | Mod: CPTII,,, | Performed by: COLON & RECTAL SURGERY

## 2024-08-29 PROCEDURE — 85025 COMPLETE CBC W/AUTO DIFF WBC: CPT | Mod: ER | Performed by: PHYSICIAN ASSISTANT

## 2024-08-29 PROCEDURE — 87427 SHIGA-LIKE TOXIN AG IA: CPT | Mod: 59,ER | Performed by: PHYSICIAN ASSISTANT

## 2024-08-29 PROCEDURE — 87449 NOS EACH ORGANISM AG IA: CPT | Mod: 91,ER | Performed by: PHYSICIAN ASSISTANT

## 2024-08-29 PROCEDURE — 1159F MED LIST DOCD IN RCRD: CPT | Mod: CPTII,,, | Performed by: COLON & RECTAL SURGERY

## 2024-08-29 PROCEDURE — 99284 EMERGENCY DEPT VISIT MOD MDM: CPT | Mod: 27,ER

## 2024-08-29 PROCEDURE — 99213 OFFICE O/P EST LOW 20 MIN: CPT | Mod: PBBFAC,PN | Performed by: COLON & RECTAL SURGERY

## 2024-08-29 PROCEDURE — 87209 SMEAR COMPLEX STAIN: CPT | Mod: ER | Performed by: PHYSICIAN ASSISTANT

## 2024-08-29 PROCEDURE — 87045 FECES CULTURE AEROBIC BACT: CPT | Mod: ER | Performed by: PHYSICIAN ASSISTANT

## 2024-08-29 PROCEDURE — 80053 COMPREHEN METABOLIC PANEL: CPT | Mod: ER | Performed by: PHYSICIAN ASSISTANT

## 2024-08-29 PROCEDURE — 81003 URINALYSIS AUTO W/O SCOPE: CPT | Mod: ER | Performed by: PHYSICIAN ASSISTANT

## 2024-08-29 PROCEDURE — 3075F SYST BP GE 130 - 139MM HG: CPT | Mod: CPTII,,, | Performed by: COLON & RECTAL SURGERY

## 2024-08-29 PROCEDURE — 87449 NOS EACH ORGANISM AG IA: CPT | Mod: ER | Performed by: PHYSICIAN ASSISTANT

## 2024-08-29 PROCEDURE — 82272 OCCULT BLD FECES 1-3 TESTS: CPT | Mod: ER | Performed by: PHYSICIAN ASSISTANT

## 2024-08-29 PROCEDURE — 96374 THER/PROPH/DIAG INJ IV PUSH: CPT | Mod: ER

## 2024-08-29 PROCEDURE — 87324 CLOSTRIDIUM AG IA: CPT | Mod: ER | Performed by: PHYSICIAN ASSISTANT

## 2024-08-29 RX ORDER — ONDANSETRON 4 MG/1
4 TABLET, ORALLY DISINTEGRATING ORAL EVERY 8 HOURS PRN
Qty: 8 TABLET | Refills: 0 | Status: SHIPPED | OUTPATIENT
Start: 2024-08-29

## 2024-08-29 RX ORDER — ONDANSETRON HYDROCHLORIDE 2 MG/ML
4 INJECTION, SOLUTION INTRAVENOUS
Status: COMPLETED | OUTPATIENT
Start: 2024-08-29 | End: 2024-08-29

## 2024-08-29 RX ORDER — ASPIRIN 325 MG
50000 TABLET, DELAYED RELEASE (ENTERIC COATED) ORAL
COMMUNITY

## 2024-08-29 RX ADMIN — ONDANSETRON 4 MG: 2 INJECTION INTRAMUSCULAR; INTRAVENOUS at 07:08

## 2024-08-29 NOTE — PROGRESS NOTES
[facility] Office Visit Followup    Referring Md:   No referring provider defined for this encounter.    SUBJECTIVE:     Chief Complaint: defecatory dysfunction    History of Present Illness:  Course is as follows:  Patient is a 39 y.o. female presents with defecatory dysfunction.     Seen by GI for evaluation for constipation  Taking linzess 290 every morning. Not really helping.  trulance was effective, then stopped working   Tried motegrity - got bad headaches. Not taking anymore  Failed amitiza  Tried pelvic physical therapy with no significant improvement.  Tried intermittent enemas with no significant improvement.  Restarted a regimen of Linzess 290 mcg every day.  This was ineffective.  She then restarted Trulance.  she will not have any bowel movements unless she takes a laxative.  With a laxative, she will have bowel movements once per week.  Without a laxative, she will have bowel movements once per month.  She complains of significant bloating, abdominal distention, pain, and inability to function normally secondary to her discomfort.  Thyroid function normal.    Past abdominal surgeries of prior  through a lower midline, abdominal plasty, and recent laparoscopic appendectomy.    No family hx of CRC in 1st degree.   Aunt - CRC    Workup:  Colonoscopy:  10/20/2021: Normal ileum and colon with redundant sigmoid colon  Defecogram 22:   - The anorectal angle measurements fall within range and there is expected increase in the angle on defecation.    - Small anterior rectocele.    - Small amount of residual after evacuation   CT abdomen pelvis 2023 reviewed demonstrates moderate amount of stool throughout the colon.  Small-bowel follow-through 2028: Normal small bowel follow through evaluation  Sitz Marker Study:    Day 1 (2023):  13 markers in the right colon.  Four markers of the hepatic flexure.  Two markers in the transverse colon.  Day 3 (2023):  5 markers at the splenic  flexure.  One marker in the rectum.  Day 5 (07/28/2023):  2 markers in the descending colon.  Four markers in the rectum.    Impression:  Consistent with slow transit constipation.     08/22/2023: Presents for follow-up.  Continuing to have significant issues with constipation.   - Manometry balloon was then inserted and filled to 60 mL of water.  She was then placed on the toilet and asked to expel the balloon.  She expelled the balloon in 2 minutes.  2/2/24:  Presents for evaluation for total abdominal colectomy.  Having to take increasing amounts of laxatives in order to have a bowel movement.  Normally takes 48-72 hours after laxatives to generate a bowel movement.  With laxatives, she is having bowel movements once per week.    3/4/24: laparoscopic total abdominal colectomy  Pathology: benign    3/22/24:  She presents for follow-up.  She is having 1 bowel movement per day.  Pleased with her outcome.  Incisions healing well.  Pain well controlled.    4/19/24:  Doing very well.  Having bowel movements 2-3 times per day.  No incontinence.  Feels well.  No issues with her incision.    8/29/24:  She presents for evaluation for increasing flatus that was very foul-smelling as well as increased pelvic and anal pressure.  Normally, she is having 1 bowel movement per day.  Over the past several weeks, she has increased to 3-4 bowel movements per day no bleeding.  No issues with her incision.  No issues with incontinence.    Last Colonoscopy:  10/20/2021:  Impression:            - The examined portion of the ileum was normal.                          - The entire examined colon is normal on direct                          and retroflexion views. Sigmoid colon is redundant.                          - No specimens collected.       Review of Systems:  Review of Systems   Constitutional:  Negative for chills, diaphoresis, fever, malaise/fatigue and weight loss.   HENT:  Negative for congestion.    Respiratory:  Negative for  "shortness of breath.    Cardiovascular:  Negative for chest pain and leg swelling.   Gastrointestinal:  Positive for abdominal pain and diarrhea. Negative for blood in stool, constipation, nausea and vomiting.   Genitourinary:  Negative for dysuria.   Musculoskeletal:  Negative for back pain and myalgias.   Skin:  Negative for rash.   Neurological:  Negative for dizziness and weakness.   Endo/Heme/Allergies:  Does not bruise/bleed easily.   Psychiatric/Behavioral:  Negative for depression.        OBJECTIVE:     Vital Signs (Most Recent)  BP (!) 138/92 (BP Location: Right arm, Patient Position: Sitting, BP Method: Large (Automatic))   Pulse 75   Ht 5' 5" (1.651 m)   Wt 79.2 kg (174 lb 9.7 oz)   BMI 29.06 kg/m²     Physical Exam:  General: Black or  female in no distress   Neuro: alert and oriented x 4.  Moves all extremities.     HEENT: no icterus.  Trachea midline  Respiratory: respirations are even and unlabored  Cardiac: regular rate  Abdomen:  Incisions healing well.  No hernia.  No infection.  Extremities: Warm dry and intact  Skin: no rashes  Anorectal:  Deferred.    Labs:  H&H 13 and 38.  Albumin 4.2.  Normal renal function.  Normal thyroid function.    Imaging:  CT abdomen pelvis 07/09/2023 reviewed demonstrates moderate amount of stool throughout the colon.  Acute appendicitis.      ASSESSMENT/PLAN:     Priya was seen today for follow-up.    Diagnoses and all orders for this visit:    Diarrhea of infectious origin  -     Clostridium difficile EIA; Future          39 y.o. female with significant constipation.  Defecography shows that the rectum was functional to liquids.  Balloon expulsion was performed today in clinic she was able to expel the balloon with 60 mL of water.  Small-bowel follow-through demonstrates normal transit time of her small bowel.  Sitz marker study consistent with slow transit constipation.    She is tried medical therapy including Linzess, Amitiza, Trulance, " pelvic physical therapy, enemas, and increasing doses of laxatives.  She therefore underwent laparoscopic total abdominal colectomy in 3/4/24.      Currently, she is having increased foul-smelling gas and diarrhea.  Will rule out C diff.  Otherwise, will start probiotics as well as FiberCon.  I will follow up with her with the results of her C diff testing.    KOKO Naylor MD, FACS, FASCRS  Staff Surgeon  Colon & Rectal Surgery

## 2024-08-30 LAB
C DIFF GDH STL QL: NEGATIVE
C DIFF TOX A+B STL QL IA: NEGATIVE

## 2024-08-30 NOTE — DISCHARGE INSTRUCTIONS
Maintain adequate hydration healthy bland diet to reduce stomach upset.  Close follow-up with colorectal surgery established physician.  Take prescribed medication as directed.  Return to ED with any worsening of symptoms or condition.

## 2024-08-30 NOTE — ED PROVIDER NOTES
"Encounter Date: 2024       History     Chief Complaint   Patient presents with    Diarrhea    Nausea     Pt c/o nausea and diarrhea x3 days. Pt reports seen in clinic today and was told to come to ED to get evaluated for a "stomach bug". Reports approx 4 episodes of diarrhea today. Denies pain or vomiting. + nausea.      39-year-old female presenting to emergency department with complaints of generalized upset stomach, nausea and diarrhea throughout the day today.  Patient with history of chronic constipation, actually seen by Colorectal Clinic earlier today.  Patient states "I am not sure if I am dehydrated because a have a headache too." Patient denies any vomit.  Patient denies any blood in stool.  Patient was given stool collection kit by Colorectal to bring to lab and she reports she arrived to lab to late this evening to submit.  No urinary symptoms.  No other physical complaints this time.  Patient reports recently taken a COVID test at home which was negative.    The history is provided by the patient. No  was used.     Review of patient's allergies indicates:  No Known Allergies  Past Medical History:   Diagnosis Date    Anxiety     Heart murmur     Hypertension     Migraine      Past Surgical History:   Procedure Laterality Date     SECTION      COLONOSCOPY  10/20/2021    COLONOSCOPY N/A 10/20/2021    Procedure: COLONOSCOPY 2 day golytely;  Surgeon: Zheng Woodruff MD;  Location: [facility];  Service: Endoscopy;  Laterality: N/A;    EPIDURAL STEROID INJECTION INTO CERVICAL SPINE N/A 2023    Procedure: C7-T1 LIZANDRO;  Surgeon: Eric Jessica MD;  Location: [facility] PAIN MANAGEMENT;  Service: Pain Management;  Laterality: N/A;  30 mins    EPIDURAL STEROID INJECTION INTO CERVICAL SPINE N/A 2023    Procedure: C7-T1 LIZANDRO;  Surgeon: Jose Metcalf DO;  Location: [facility] PAIN MANAGEMENT;  Service: Pain Management;  Laterality: N/A;  15 mins    " ESOPHAGOGASTRODUODENOSCOPY N/A 10/20/2021    Procedure: EGD (ESOPHAGOGASTRODUODENOSCOPY);  Surgeon: Zheng Woodruff MD;  Location: [facility] ENDO;  Service: Endoscopy;  Laterality: N/A;    FLEXIBLE SIGMOIDOSCOPY N/A 3/4/2024    Procedure: SIGMOIDOSCOPY, FLEXIBLE;  Surgeon: KOKO Naylor MD;  Location: [facility] OR 2ND FLR;  Service: Colon and Rectal;  Laterality: N/A;    LAPAROSCOPIC APPENDECTOMY N/A 7/10/2023    Procedure: APPENDECTOMY, LAPAROSCOPIC;  Surgeon: Ki De La Fuente MD;  Location: [facility] OR;  Service: General;  Laterality: N/A;    LAPAROSCOPIC TOTAL COLECTOMY N/A 3/4/2024    Procedure: COLECTOMY, TOTAL, LAPAROSCOPIC,ERAS low;  Surgeon: KOKO Naylor MD;  Location: [facility] OR 2ND FLR;  Service: Colon and Rectal;  Laterality: N/A;    TUBAL LIGATION      Tummy Tuck      UPPER GASTROINTESTINAL ENDOSCOPY  10/20/2021     Family History   Problem Relation Name Age of Onset    Hypertension Mother      Breast cancer Paternal Aunt      Heart failure Maternal Grandmother      Heart failure Maternal Grandfather       Social History     Tobacco Use    Smoking status: Never    Smokeless tobacco: Never   Substance Use Topics    Alcohol use: Not Currently     Comment: socially    Drug use: No     Review of Systems   Constitutional:  Negative for chills, diaphoresis, fatigue and fever.   HENT:  Negative for congestion, ear pain, sinus pain, sore throat and trouble swallowing.    Eyes:  Negative for photophobia, pain, redness and visual disturbance.   Respiratory:  Negative for cough, shortness of breath, wheezing and stridor.    Cardiovascular:  Negative for chest pain and palpitations.   Gastrointestinal:  Positive for diarrhea and nausea. Negative for abdominal pain, blood in stool and vomiting.   Endocrine: Negative.    Genitourinary:  Negative for decreased urine volume, difficulty urinating, dysuria, flank pain, hematuria and urgency.   Musculoskeletal:  Negative for back pain, myalgias and neck pain.   Skin:  Negative.    Allergic/Immunologic: Negative.    Neurological:  Positive for headaches. Negative for dizziness, tremors, seizures, syncope, speech difficulty, weakness, light-headedness and numbness.   Hematological: Negative.    Psychiatric/Behavioral: Negative.     All other systems reviewed and are negative.      Physical Exam     Initial Vitals [08/29/24 1929]   BP Pulse Resp Temp SpO2   (!) 161/93 85 17 98.2 °F (36.8 °C) 100 %      MAP       --         Physical Exam    Nursing note and vitals reviewed.  Constitutional: Vital signs are normal. She appears well-developed and well-nourished. She is not diaphoretic. No distress.   No acute distress, nontoxic parents, breathing comfortably on room air, normal steady gait   HENT:   Head: Normocephalic and atraumatic.   Right Ear: Hearing and external ear normal.   Left Ear: Hearing and external ear normal.   Nose: Nose normal.   Mouth/Throat: Oropharynx is clear and moist.   Eyes: Conjunctivae and EOM are normal. Pupils are equal, round, and reactive to light.   Neck: Trachea normal. Neck supple.   Normal range of motion.  Cardiovascular:  Normal rate, regular rhythm and normal pulses.           Pulmonary/Chest: Effort normal. No accessory muscle usage. No tachypnea. No respiratory distress.   Abdominal: Abdomen is soft. There is no abdominal tenderness.   Abdomen soft and nontender throughout, no rigidity, no distention, no guarding or rebound There is no rebound and no guarding.   Musculoskeletal:         General: No tenderness. Normal range of motion.      Cervical back: Normal range of motion and neck supple.     Neurological: She is alert and oriented to person, place, and time. She has normal strength. She displays no tremor. She exhibits normal muscle tone. She displays no seizure activity. Coordination normal. GCS score is 15. GCS eye subscore is 4. GCS verbal subscore is 5. GCS motor subscore is 6.   Skin: Skin is warm and dry. Capillary refill takes less  than 2 seconds.         ED Course   Procedures  Labs Reviewed   CBC W/ AUTO DIFFERENTIAL - Abnormal       Result Value    WBC 6.18      RBC 4.32      Hemoglobin 12.6      Hematocrit 38.8      MCV 90      MCH 29.2      MCHC 32.5      RDW 15.8 (*)     Platelets 256      MPV 10.2      Immature Granulocytes 0.3      Gran # (ANC) 3.6      Immature Grans (Abs) 0.02      Lymph # 1.8      Mono # 0.6      Eos # 0.1      Baso # 0.04      nRBC 0      Gran % 58.3      Lymph % 29.6      Mono % 9.9      Eosinophil % 1.3      Basophil % 0.6      Differential Method Automated     OCCULT BLOOD X 1, STOOL - Abnormal    Occult Blood Positive (*)    CLOSTRIDIUM DIFFICILE   CULTURE, STOOL   ENTEROHEMORRHAGIC E.COLI   COMPREHENSIVE METABOLIC PANEL    Sodium 142      Potassium 4.1      Chloride 104      CO2 27      Glucose 89      BUN 9      Creatinine 0.88      Calcium 9.2      Total Protein 7.3      Albumin 4.0      Total Bilirubin 0.4      Alkaline Phosphatase 69      AST 29      ALT 20      Anion Gap 11      eGFR >60.0     LIPASE    Lipase Result 95     URINALYSIS, REFLEX TO URINE CULTURE   WBC, STOOL   GIARDIA/CRYPTOSPORIDIUM (EIA)   STOOL EXAM-OVA,CYSTS,PARASITES   POCT URINE PREGNANCY          Imaging Results    None          Medications   sodium chloride 0.9% bolus 1,000 mL 1,000 mL (has no administration in time range)   ondansetron injection 4 mg (4 mg Intravenous Given 8/29/24 1959)     Medical Decision Making  Discussed care plan with patient.  Patient is seen by Colorectal surgery earlier today clinic.  She has a history of chronic constipation.  She has been dealing with persistent diarrhea over last 24-48 hours.  4-5 episodes of nonbloody diarrhea today associated with nausea.  Stool studies obtained this evening for follow-up purposes.  Patient given IV fluids and Zofran with marked improvement in symptoms, reports feeling much better.  Vital signs stable and continued to be stable during ED stay.  Lab workup overall  unremarkable with no clinical signs of dehydration or electrolyte abnormalities.  Plan for close follow-up with Colorectal Services, PCP and did discussed ED return precautions.  Patient is stable, all questions answered ready for discharge.    Differential diagnosis acute viral syndrome, gastroenteritis, infectious diarrhea    Amount and/or Complexity of Data Reviewed  Labs: ordered.    Risk  Prescription drug management.               ED Course as of 08/29/24 2148   Thu Aug 29, 2024   1931 Temp: 98.2 °F (36.8 °C) [MC]   1931 Pulse: 85 [MC]   2131 Patient reports feeling much better after medication and IV fluids.  Planning for discharge, close follow-up with colorectal surgery where she is established for continued care and evaluation.  Stool studies ordered today to assist for follow-up purposes. [MC]      ED Course User Index  [MC] Sammi Gutierrez PA-C                           Clinical Impression:  Final diagnoses:  [R11.0] Nausea (Primary)  [R19.7] Diarrhea, unspecified type  [R14.0] Abdominal bloating          ED Disposition Condition    Discharge Stable          ED Prescriptions       Medication Sig Dispense Start Date End Date Auth. Provider    ondansetron (ZOFRAN-ODT) 4 MG TbDL Take 1 tablet (4 mg total) by mouth every 8 (eight) hours as needed (nausea). 8 tablet 8/29/2024 -- Sammi Gutierrez PA-C          Follow-up Information       Follow up With Specialties Details Why Contact Info    Vinny Rose MD Internal Medicine Schedule an appointment as soon as possible for a visit in 2 days If symptoms worsen [address] [id number] [phone]      [facility] - Emergency Dept Emergency Medicine Go to  If symptoms worsen [address] [facility]  Emergency Department  [facility] [id number] [phone]          PATIENT SEEN BY AGGIE ONLY.     Sammi Gutierrez PA-C  08/29/24 2148

## 2024-09-02 LAB — BACTERIA STL CULT: NORMAL

## 2024-09-05 LAB — O+P STL MICRO: NORMAL

## 2024-09-18 ENCOUNTER — TELEPHONE (OUTPATIENT)
Dept: OTOLARYNGOLOGY | Facility: CLINIC | Age: 39
End: 2024-09-18
Payer: MEDICAID

## 2024-09-18 NOTE — TELEPHONE ENCOUNTER
Returned call. Apt rescheduled to 9/23 at 8AM. Pt thanked me and verbalized understanding.     ----- Message from Rima Caldwell sent at 9/18/2024 12:21 PM CDT -----  Regarding: Appt  Contact: [phone]  Type:  Needs Medical Advice    Who Called: Priya  Would the patient rather a call back or a response via [facility]? Call  Best Call Back Number:  [phone]  Additional Information: Patient is calling to reschedule her appt on 9/20

## 2024-09-23 ENCOUNTER — OFFICE VISIT (OUTPATIENT)
Dept: OTOLARYNGOLOGY | Facility: CLINIC | Age: 39
End: 2024-09-23
Payer: MEDICAID

## 2024-09-23 VITALS
WEIGHT: 168.44 LBS | DIASTOLIC BLOOD PRESSURE: 93 MMHG | HEART RATE: 68 BPM | SYSTOLIC BLOOD PRESSURE: 154 MMHG | BODY MASS INDEX: 28.03 KG/M2

## 2024-09-23 DIAGNOSIS — K21.9 LARYNGOPHARYNGEAL REFLUX (LPR): Chronic | ICD-10-CM

## 2024-09-23 DIAGNOSIS — R13.10 DYSPHAGIA, UNSPECIFIED TYPE: Primary | Chronic | ICD-10-CM

## 2024-09-23 DIAGNOSIS — J30.9 CHRONIC ALLERGIC RHINITIS: Chronic | ICD-10-CM

## 2024-09-23 DIAGNOSIS — R09.A2 GLOBUS SENSATION: Chronic | ICD-10-CM

## 2024-09-23 PROCEDURE — 3080F DIAST BP >= 90 MM HG: CPT | Mod: CPTII,,, | Performed by: OTOLARYNGOLOGY

## 2024-09-23 PROCEDURE — 99999 PR PBB SHADOW E&M-EST. PATIENT-LVL III: CPT | Mod: PBBFAC,,, | Performed by: OTOLARYNGOLOGY

## 2024-09-23 PROCEDURE — 3044F HG A1C LEVEL LT 7.0%: CPT | Mod: CPTII,,, | Performed by: OTOLARYNGOLOGY

## 2024-09-23 PROCEDURE — 31575 DIAGNOSTIC LARYNGOSCOPY: CPT | Mod: S$PBB,,, | Performed by: OTOLARYNGOLOGY

## 2024-09-23 PROCEDURE — 1160F RVW MEDS BY RX/DR IN RCRD: CPT | Mod: CPTII,,, | Performed by: OTOLARYNGOLOGY

## 2024-09-23 PROCEDURE — 99213 OFFICE O/P EST LOW 20 MIN: CPT | Mod: PBBFAC,PN | Performed by: OTOLARYNGOLOGY

## 2024-09-23 PROCEDURE — 1159F MED LIST DOCD IN RCRD: CPT | Mod: CPTII,,, | Performed by: OTOLARYNGOLOGY

## 2024-09-23 PROCEDURE — 31575 DIAGNOSTIC LARYNGOSCOPY: CPT | Mod: PBBFAC,PN | Performed by: OTOLARYNGOLOGY

## 2024-09-23 PROCEDURE — 3077F SYST BP >= 140 MM HG: CPT | Mod: CPTII,,, | Performed by: OTOLARYNGOLOGY

## 2024-09-23 PROCEDURE — 3008F BODY MASS INDEX DOCD: CPT | Mod: CPTII,,, | Performed by: OTOLARYNGOLOGY

## 2024-09-23 PROCEDURE — 99214 OFFICE O/P EST MOD 30 MIN: CPT | Mod: 25,S$PBB,, | Performed by: OTOLARYNGOLOGY

## 2024-09-23 RX ORDER — LEVOCETIRIZINE DIHYDROCHLORIDE 5 MG/1
5 TABLET, FILM COATED ORAL NIGHTLY
Qty: 30 TABLET | Refills: 11 | Status: CANCELLED | OUTPATIENT
Start: 2024-09-23 | End: 2025-09-23

## 2024-09-23 RX ORDER — LEVOCETIRIZINE DIHYDROCHLORIDE 5 MG/1
5 TABLET, FILM COATED ORAL NIGHTLY
Qty: 30 TABLET | Refills: 11 | Status: SHIPPED | OUTPATIENT
Start: 2024-09-23 | End: 2025-09-23

## 2024-09-23 RX ORDER — PANTOPRAZOLE SODIUM 40 MG/1
40 TABLET, DELAYED RELEASE ORAL 2 TIMES DAILY
Qty: 60 TABLET | Refills: 2 | Status: SHIPPED | OUTPATIENT
Start: 2024-09-23 | End: 2025-09-23

## 2024-09-23 RX ORDER — FLUTICASONE PROPIONATE 50 MCG
2 SPRAY, SUSPENSION (ML) NASAL DAILY
Qty: 9.9 ML | Refills: 11 | Status: SHIPPED | OUTPATIENT
Start: 2024-09-23

## 2024-09-23 RX ORDER — AZELASTINE 1 MG/ML
1 SPRAY, METERED NASAL 2 TIMES DAILY
Qty: 30 ML | Refills: 3 | Status: SHIPPED | OUTPATIENT
Start: 2024-09-23 | End: 2025-09-23

## 2024-09-23 NOTE — PROGRESS NOTES
Chief Complaint   Patient presents with    Follow-up     No improvement stated that some days she swallowing fine but still having swallowing issues and pain on right side    .     HPI:Priya Conrad is a 39 y.o. female who has been self- referred for a globus sensation in her throat. She notes that it seems to be noticeable on swallowing.  It has been present for about 4 months.  Seems to be getting worse. She notes intermittent history of hoarseness. Her voice is not progressively worsening over this time. There are pitch breaks or cracks. There is not vocal fatigue. She denies food sticking, choking with swallowing, odynophagia, throat pain, and otalgia.  There is no hemoptysis or hematemesis.     She admits to throat clearing.  She admits to history of heartburn and reflux. She has taken pantoprazole in the past.     Interval HPI 9/23/2024:  Follow up visit. Last visit 4/24. Reports that she is having continuing globus sensation and feels that she needs to clear her throat frequently.  Notes that she still has difficulty swallowing. Feels her throat is dry. She feels swallowing is more effortful. Denies food sticking.  She denies aspiration symptoms. She denies hoarseness.  She has been on Protonix 40mg PO daily.     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[place]     Social Determinants of Health     Financial Resource Strain: Medium Risk (4/19/2024)    Overall Financial Resource Strain  (CARDIA)     Difficulty of Paying Living Expenses: Somewhat hard   Food Insecurity: Food Insecurity Present (2024)    Hunger Vital Sign     Worried About Running Out of Food in the Last Year: Never true     Ran Out of Food in the Last Year: Sometimes true   Transportation Needs: No Transportation Needs (2024)    PRAPARE - Transportation     Lack of Transportation (Medical): No     Lack of Transportation (Non-Medical): No   Physical Activity: Insufficiently Active (2024)    Exercise Vital Sign     Days of Exercise per Week: 3 days     Minutes of Exercise per Session: 40 min   Stress: Stress Concern Present (2024)    [employer] - Occupational Stress Questionnaire     Feeling of Stress : Very much   Housing Stability: Unknown (3/5/2024)    Housing Stability Vital Sign     Unable to Pay for Housing in the Last Year: No     Unstable Housing in the Last Year: No     Past Surgical History:   Procedure Laterality Date     SECTION      COLONOSCOPY  10/20/2021    COLONOSCOPY N/A 10/20/2021    Procedure: COLONOSCOPY 2 day golytely;  Surgeon: Zheng Woodruff MD;  Location: [facility];  Service: Endoscopy;  Laterality: N/A;    EPIDURAL STEROID INJECTION INTO CERVICAL SPINE N/A 2023    Procedure: C7-T1 LIZANDRO;  Surgeon: Eric Jessica MD;  Location: [facility] PAIN MANAGEMENT;  Service: Pain Management;  Laterality: N/A;  30 mins    EPIDURAL STEROID INJECTION INTO CERVICAL SPINE N/A 2023    Procedure: C7-T1 LIZANDRO;  Surgeon: Jose Metcalf DO;  Location: [facility] PAIN MANAGEMENT;  Service: Pain Management;  Laterality: N/A;  15 mins    ESOPHAGOGASTRODUODENOSCOPY N/A 10/20/2021    Procedure: EGD (ESOPHAGOGASTRODUODENOSCOPY);  Surgeon: Zheng Woodruff MD;  Location: [facility] ENDO;  Service: Endoscopy;  Laterality: N/A;    FLEXIBLE SIGMOIDOSCOPY N/A 3/4/2024    Procedure: SIGMOIDOSCOPY, FLEXIBLE;  Surgeon: KOKO Naylor MD;  Location: [facility] OR [address] [place] [address];  Service:  Colon and Rectal;  Laterality: N/A;    LAPAROSCOPIC APPENDECTOMY N/A 7/10/2023    Procedure: APPENDECTOMY, LAPAROSCOPIC;  Surgeon: Ki De La Fuente MD;  Location: [facility];  Service: General;  Laterality: N/A;    LAPAROSCOPIC TOTAL COLECTOMY N/A 3/4/2024    Procedure: COLECTOMY, TOTAL, LAPAROSCOPIC,ERAS low;  Surgeon: KOKO Naylor MD;  Location: [facility] OR [address] [place] [address];  Service: Colon and Rectal;  Laterality: N/A;    TUBAL LIGATION      Tummy Tuck      UPPER GASTROINTESTINAL ENDOSCOPY  10/20/2021     Family History   Problem Relation Name Age of Onset    Hypertension Mother      Breast cancer Paternal Aunt      Heart failure Maternal Grandmother      Heart failure Maternal Grandfather             Review of Systems  General: negative for chills, fever or weight loss  Psychological: negative for mood changes or depression  Ophthalmic: negative for blurry vision, photophobia or eye pain  ENT: see HPI  Respiratory: no cough, shortness of breath, or wheezing  Cardiovascular: no chest pain or dyspnea on exertion  Gastrointestinal: no abdominal pain, change in bowel habits, or black/ bloody stools  Musculoskeletal: negative for gait disturbance or muscular weakness  Neurological: no syncope or seizures; no ataxia  Dermatological: negative for puritis,  rash and jaundice  Hematologic/lymphatic: no easy bruising, no new lumps or bumps      Physical Exam:    Vitals:    09/23/24 0807   BP: (!) 154/93   Pulse: 68       Constitutional: Well appearing / communicating without difficutly.  NAD.  Eyes: EOM I Bilaterally  Head/Face: Normocephalic.  Negative paranasal sinus pressure/tenderness.  Salivary glands WNL.  House Brackmann I Bilaterally.    Right Ear: Auricle normal appearance. External Auditory Canal within normal limits no lesions or masses,TM w/o masses/lesions/perforations. TM mobility noted.   Left Ear: Auricle normal appearance. External Auditory Canal within normal limits no lesions or masses,TM w/o  masses/lesions/perforations. TM mobility noted.  Nose: + mucosal edema. No gross nasal septal deviation. Inferior Turbinates 3+ bilaterally. No septal perforation. No masses/lesions. External nasal skin appears normal without masses/lesions.  Oral Cavity: Gingiva/lips within normal limits.  Dentition/gingiva healthy appearing. Mucus membranes moist. Floor of mouth soft, no masses palpated. Oral Tongue mobile. Hard Palate appears normal.    Oropharynx: Base of tongue appears normal. No masses/lesions noted. Tonsillar fossa/pharyngeal wall without lesions. Posterior oropharynx WNL.  Soft palate without masses. Midline uvula.   Neck/Lymphatic: No LAD I-VI bilaterally.  No thyromegaly.  No masses noted on exam.    Mirror laryngoscopy/nasopharyngoscopy: Active gag reflex.  Unable to perform.    See separate procedure note for FFL.    Diagnostic studies reviewed: none      Assessment:    ICD-10-CM ICD-9-CM    1. Dysphagia, unspecified type  R13.10 787.20 FL Esophagram pharynx and/or cervical      Fl Modified Barium Swallow Speech      SLP video swallow      2. Chronic allergic rhinitis  J30.9 477.9       3. Globus sensation  R09.A2 784.99       4. Laryngopharyngeal reflux (LPR)  K21.9 478.79           The primary encounter diagnosis was Dysphagia, unspecified type. Diagnoses of Chronic allergic rhinitis, Globus sensation, and Laryngopharyngeal reflux (LPR) were also pertinent to this visit.      Plan:  Orders Placed This Encounter   Procedures    FL Esophagram pharynx and/or cervical    Fl Modified Barium Swallow Speech    SLP video swallow     Recommend nasal saline rinses BID  Start Flonase 2 sprays per nostril daily  Start Astelin 1 spray per nostril BID  Resume xyzal 10 mg PO daily  Increase  Pantoprazole from 40 mg PO daily to 40 mg PO BID* and provided a prescription. I also recommended that the patient refrain from eating within 3 hours of going to bed, to elevate the head of bed very subtly and optimize the impact  of gravity on the potential reflux, and to avoid alcohol, caffeine, tobacco, tomato sauce, spicy foods, fried food, and chocolate.  Vocal hygiene measures handout provided  Obtain barium esophagram and MBSS     Follow up in 8 weeks  to reassess progress with treatment regimen.     Ada Medina MD

## 2024-09-23 NOTE — PROCEDURES
Laryngoscopy    Date/Time: 9/23/2024 8:00 AM    Performed by: Ada Weldon MD  Authorized by: Ada Weldon MD    Consent Done?:  Yes (Verbal)  Anesthesia:     Local anesthetic:  Lidocaine 2% and Scar-Synephrine 1/2%  Laryngoscopy:     Areas examined:  Nasal cavities, nasopharynx, oropharynx, hypopharynx, larynx and vocal cords  Nose External:      No external nasal deformity  Nose Intranasal:      Mucosa no polyps     Mucosa ulcers not present     No mucosa lesions present     No septum gross deformity     Turbinates not enlarged  Nasopharynx:      No mucosa lesions     Adenoids not present     Posterior choanae patent     Eustachian tube patent  Larynx/hypopharynx:      No epiglottis lesions     No epiglottis edema     No AE folds lesions     No vocal cord polyps     Equal and normal bilateral     No hypopharynx lesions     No piriform sinus pooling     No piriform sinus lesions     Post cricoid edema (moderate)     Post cricoid erythema (mild)     + cobblestoning present on posterior pharyngeal wall

## 2024-10-03 DIAGNOSIS — N64.4 BREAST PAIN: Primary | ICD-10-CM

## 2024-10-07 ENCOUNTER — PATIENT MESSAGE (OUTPATIENT)
Dept: SURGERY | Facility: CLINIC | Age: 39
End: 2024-10-07
Payer: MEDICAID

## 2024-10-08 ENCOUNTER — TELEPHONE (OUTPATIENT)
Dept: RADIOLOGY | Facility: HOSPITAL | Age: 39
End: 2024-10-08
Payer: MEDICAID

## 2024-10-08 NOTE — TELEPHONE ENCOUNTER
----- Message from Patti sent at 10/8/2024  1:50 PM CDT -----  Type:  Patient Returning Call    Who Called: OSBALDO WILEY [[id number]]    Who Left Message for Patient: Cruz     Does the patient know what this is regarding? Appt / mammogram     Best Call Back Number:Telephone Information:  Mobile          [phone]

## 2024-10-10 ENCOUNTER — HOSPITAL ENCOUNTER (OUTPATIENT)
Dept: RADIOLOGY | Facility: HOSPITAL | Age: 39
Discharge: HOME | End: 2024-10-10
Attending: OBSTETRICS & GYNECOLOGY
Payer: MEDICAID

## 2024-10-10 DIAGNOSIS — N64.4 BREAST PAIN: ICD-10-CM

## 2024-10-10 PROCEDURE — 77061 BREAST TOMOSYNTHESIS UNI: CPT | Mod: 26,LT,, | Performed by: RADIOLOGY

## 2024-10-10 PROCEDURE — 77065 DX MAMMO INCL CAD UNI: CPT | Mod: TC,LT

## 2024-10-10 PROCEDURE — 77065 DX MAMMO INCL CAD UNI: CPT | Mod: 26,LT,, | Performed by: RADIOLOGY

## 2024-10-10 PROCEDURE — 77061 BREAST TOMOSYNTHESIS UNI: CPT | Mod: TC,LT

## 2024-11-10 ENCOUNTER — PATIENT MESSAGE (OUTPATIENT)
Dept: SURGERY | Facility: CLINIC | Age: 39
End: 2024-11-10
Payer: COMMERCIAL

## 2024-11-12 ENCOUNTER — PATIENT MESSAGE (OUTPATIENT)
Dept: SURGERY | Facility: CLINIC | Age: 39
End: 2024-11-12
Payer: COMMERCIAL

## 2024-11-12 DIAGNOSIS — R10.30 LOWER ABDOMINAL PAIN: Primary | ICD-10-CM

## 2024-11-13 DIAGNOSIS — K59.01 CONSTIPATION BY DELAYED COLONIC TRANSIT: Primary | ICD-10-CM

## 2024-11-25 ENCOUNTER — HOSPITAL ENCOUNTER (OUTPATIENT)
Dept: RADIOLOGY | Facility: HOSPITAL | Age: 39
Discharge: HOME | End: 2024-11-25
Attending: COLON & RECTAL SURGERY

## 2024-11-25 DIAGNOSIS — R10.30 LOWER ABDOMINAL PAIN: ICD-10-CM

## 2024-11-25 PROCEDURE — 74177 CT ABD & PELVIS W/CONTRAST: CPT | Mod: TC,PN

## 2024-11-25 PROCEDURE — 74177 CT ABD & PELVIS W/CONTRAST: CPT | Mod: 26,,, | Performed by: RADIOLOGY

## 2024-11-25 PROCEDURE — 25500020 PHARM REV CODE 255: Mod: PN | Performed by: COLON & RECTAL SURGERY

## 2024-11-25 PROCEDURE — A9698 NON-RAD CONTRAST MATERIALNOC: HCPCS | Mod: PN | Performed by: COLON & RECTAL SURGERY

## 2024-11-25 RX ADMIN — IOHEXOL 75 ML: 350 INJECTION, SOLUTION INTRAVENOUS at 08:11

## 2024-11-25 RX ADMIN — IOHEXOL 500 ML: 12 SOLUTION ORAL at 08:11

## 2024-11-29 ENCOUNTER — NURSE TRIAGE (OUTPATIENT)
Dept: ADMINISTRATIVE | Facility: CLINIC | Age: 39
End: 2024-11-29
Payer: COMMERCIAL

## 2024-11-29 NOTE — TELEPHONE ENCOUNTER
Patient is calling due to her recent CT results. She is concerned about the kidney cyst. Patient wants to know if it is contributing to her pain. Reports that she is still having LLQ pain that radiates to her groin and left leg. Advised per protocol to f/u with her provider. Will send the office a message to f/u with the patient.  Advised the patient to call back with any further questions or if symptoms worsen.      Reason for Disposition   Nursing judgment    Protocols used: Information Only Call - No Triage-A-OH

## 2025-01-07 ENCOUNTER — OFFICE VISIT (OUTPATIENT)
Dept: OBSTETRICS AND GYNECOLOGY | Facility: CLINIC | Age: 40
End: 2025-01-07
Payer: COMMERCIAL

## 2025-01-07 VITALS
DIASTOLIC BLOOD PRESSURE: 89 MMHG | WEIGHT: 185.63 LBS | BODY MASS INDEX: 30.93 KG/M2 | HEIGHT: 65 IN | SYSTOLIC BLOOD PRESSURE: 146 MMHG

## 2025-01-07 DIAGNOSIS — R10.2 PELVIC PAIN: Primary | ICD-10-CM

## 2025-01-07 DIAGNOSIS — R10.32 CHRONIC LLQ PAIN: ICD-10-CM

## 2025-01-07 DIAGNOSIS — G89.29 CHRONIC LLQ PAIN: ICD-10-CM

## 2025-01-07 PROBLEM — N92.1 MENOMETRORRHAGIA: Status: ACTIVE | Noted: 2024-06-03

## 2025-01-07 PROBLEM — K59.9 COLONIC INERTIA: Status: ACTIVE | Noted: 2024-02-07

## 2025-01-07 LAB
BILIRUB UR QL STRIP: NEGATIVE
CLARITY UR REFRACT.AUTO: CLEAR
COLOR UR AUTO: YELLOW
GLUCOSE UR QL STRIP: NEGATIVE
HGB UR QL STRIP: NEGATIVE
KETONES UR QL STRIP: NEGATIVE
LEUKOCYTE ESTERASE UR QL STRIP: NEGATIVE
NITRITE UR QL STRIP: NEGATIVE
PH UR STRIP: 6 [PH] (ref 5–8)
PROT UR QL STRIP: NEGATIVE
SP GR UR STRIP: 1.02 (ref 1–1.03)
URN SPEC COLLECT METH UR: NORMAL

## 2025-01-07 PROCEDURE — 3008F BODY MASS INDEX DOCD: CPT | Mod: CPTII,S$GLB,, | Performed by: NURSE PRACTITIONER

## 2025-01-07 PROCEDURE — 99213 OFFICE O/P EST LOW 20 MIN: CPT | Mod: S$GLB,,, | Performed by: NURSE PRACTITIONER

## 2025-01-07 PROCEDURE — 81003 URINALYSIS AUTO W/O SCOPE: CPT | Mod: QW,S$GLB,, | Performed by: NURSE PRACTITIONER

## 2025-01-07 PROCEDURE — 81003 URINALYSIS AUTO W/O SCOPE: CPT | Performed by: NURSE PRACTITIONER

## 2025-01-07 PROCEDURE — 99999 PR PBB SHADOW E&M-EST. PATIENT-LVL III: CPT | Mod: PBBFAC,,, | Performed by: NURSE PRACTITIONER

## 2025-01-07 PROCEDURE — 3079F DIAST BP 80-89 MM HG: CPT | Mod: CPTII,S$GLB,, | Performed by: NURSE PRACTITIONER

## 2025-01-07 PROCEDURE — 1160F RVW MEDS BY RX/DR IN RCRD: CPT | Mod: CPTII,S$GLB,, | Performed by: NURSE PRACTITIONER

## 2025-01-07 PROCEDURE — 1159F MED LIST DOCD IN RCRD: CPT | Mod: CPTII,S$GLB,, | Performed by: NURSE PRACTITIONER

## 2025-01-07 PROCEDURE — 3077F SYST BP >= 140 MM HG: CPT | Mod: CPTII,S$GLB,, | Performed by: NURSE PRACTITIONER

## 2025-01-07 RX ORDER — ERGOCALCIFEROL 1.25 MG/1
50000 CAPSULE ORAL
COMMUNITY

## 2025-01-07 NOTE — PROGRESS NOTES
Subjective:       Patient ID: Priya Conrad is a 39 y.o. female.    Chief Complaint:  Pelvic Pain      History of Present Illness     present for chronic pelvic pain x1 year  Known hx of fibroids  Was intermittent, but now constant for the last 8 months  Colon removed 3/2024; sx were present before this, but worsened after  Pain is very bad when emptying bowels-stabbing; otherwise just cramping  Nothing makes it better  Pain is daily  Avoiding gaseous foods  [place] [facility] [place]    TVUS 2024 shows  Uterus 11 x 7 x 6.5 cm  + Fibroid -- ant intramural x 1 ramo 3.6 x 3.6  EMS 9 mm  Rt ovary 2.6 x 1.8 x 2.3 cm  Lt ovary 2.9 x 1.4 x 2.3 cm    CT scan done 2024  The urinary bladder is unremarkable. Enlarged, leiomyomatous uterus with multiple cervical nabothian cysts similar to the prior study. No free pelvic fluid or adenopathy.     Dyspareunia and low libido; anxiety; has therapist, but not consistent with meds  Has buspar and xanax    Monthly menses x4d; second and third day are heavy; changing overnight pad q2-3h; mostly full; flooding sometimes; a lot of cramping.  Hot showers help the best and rest; headaches and nausea-relief with tylenol  Hands and feet are very hot a week before menses    No known hx of endo    GYN & OB History  Patient's last menstrual period was 2024 (approximate).   Date of Last Pap: 2024    OB History    Para Term  AB Living   3 3 3     3   SAB IAB Ectopic Multiple Live Births           3      # Outcome Date GA Lbr Tacos/2nd Weight Sex Type Anes PTL Lv   3 Term      Vag-Spont   DAIANA   2 Term      Vag-Spont   DAIANA   1 Term      CS-Unspec   DAIANA       Review of Systems  Review of Systems   Constitutional:  Negative for chills and fever.   Gastrointestinal:  Positive for abdominal pain and nausea. Negative for constipation, diarrhea and vomiting.   Genitourinary:  Positive for decreased libido, dyspareunia, flank pain,  frequency, menorrhagia and pelvic pain. Negative for dysuria, hematuria, menstrual problem and urgency.   Musculoskeletal:  Positive for back pain.   Integumentary:  Negative for rash.   Neurological:  Positive for headaches.   Psychiatric/Behavioral:  The patient is nervous/anxious.            Objective:      Physical Exam:   Constitutional: She is oriented to person, place, and time. She appears well-developed and well-nourished. No distress.    HENT:   Head: Normocephalic and atraumatic.    Eyes: Pupils are equal, round, and reactive to light. Conjunctivae and EOM are normal.      Pulmonary/Chest: Effort normal.                  Musculoskeletal: Normal range of motion and moves all extremeties.       Neurological: She is alert and oriented to person, place, and time.    Skin: Skin is warm and dry. No rash noted. She is not diaphoretic. No erythema. No pallor.    Psychiatric: She has a normal mood and affect. Her behavior is normal. Judgment and thought content normal.             Assessment:        1. Pelvic pain    2. Chronic LLQ pain               Plan:   Reviewed imaging with her  Discussed pain can be scar tissue  Encouraged to f/u with GI  Balanced meals    Ucx sent  Neg upt    Pt was counseled on libido, including the many factors that influence it.  Pt has several risk factors for decreased libido: history of anxiety.  Pt was counseled on stress reduction and lifestyle modifications (healthy diet, exercise, consistent sleep hours, etc).  Pt also advised on option to pursue couples counseling.  Hormone testing or treatment not indicated at this time.    Continue annual well woman exam.    Pelvic pain    Chronic LLQ pain

## 2025-01-07 NOTE — LETTER
January 7, 2025      [facility]  [id number] [place] [address]  Phone: [phone]       Patient: Priya Conrad   YOB: 1985  Date of Visit: 01/07/2025    To Whom It May Concern:    Kathy Conrad  was at [facility] on 01/07/2025. The patient may return to work/school on 01/07/2025 with no restrictions. If you have any questions or concerns, or if I can be of further assistance, please do not hesitate to contact me.    Sincerely,    VÍCTOR Engle/Eran MclaughlinCMA

## 2025-01-09 ENCOUNTER — OCCUPATIONAL HEALTH (OUTPATIENT)
Dept: URGENT CARE | Facility: CLINIC | Age: 40
End: 2025-01-09

## 2025-01-09 DIAGNOSIS — Z02.89 ENCOUNTER FOR EXAMINATION REQUIRED BY DEPARTMENT OF TRANSPORTATION (DOT): Primary | ICD-10-CM

## 2025-02-10 ENCOUNTER — PATIENT MESSAGE (OUTPATIENT)
Dept: SURGERY | Facility: CLINIC | Age: 40
End: 2025-02-10
Payer: COMMERCIAL

## 2025-02-11 ENCOUNTER — OFFICE VISIT (OUTPATIENT)
Dept: SURGERY | Facility: CLINIC | Age: 40
End: 2025-02-11
Payer: COMMERCIAL

## 2025-02-11 VITALS
WEIGHT: 190.25 LBS | HEIGHT: 65 IN | OXYGEN SATURATION: 100 % | HEART RATE: 71 BPM | SYSTOLIC BLOOD PRESSURE: 142 MMHG | BODY MASS INDEX: 31.7 KG/M2 | DIASTOLIC BLOOD PRESSURE: 93 MMHG

## 2025-02-11 DIAGNOSIS — L30.9 DERMATITIS: Primary | ICD-10-CM

## 2025-02-11 PROCEDURE — 99999 PR PBB SHADOW E&M-EST. PATIENT-LVL III: CPT | Mod: PBBFAC,,, | Performed by: NURSE PRACTITIONER

## 2025-02-11 PROCEDURE — 99212 OFFICE O/P EST SF 10 MIN: CPT | Mod: S$GLB,,, | Performed by: NURSE PRACTITIONER

## 2025-02-11 PROCEDURE — 3077F SYST BP >= 140 MM HG: CPT | Mod: CPTII,S$GLB,, | Performed by: NURSE PRACTITIONER

## 2025-02-11 PROCEDURE — 3080F DIAST BP >= 90 MM HG: CPT | Mod: CPTII,S$GLB,, | Performed by: NURSE PRACTITIONER

## 2025-02-11 PROCEDURE — 3008F BODY MASS INDEX DOCD: CPT | Mod: CPTII,S$GLB,, | Performed by: NURSE PRACTITIONER

## 2025-02-11 PROCEDURE — 1159F MED LIST DOCD IN RCRD: CPT | Mod: CPTII,S$GLB,, | Performed by: NURSE PRACTITIONER

## 2025-02-11 RX ORDER — HYDROXYZINE HYDROCHLORIDE 25 MG/1
25 TABLET, FILM COATED ORAL 3 TIMES DAILY PRN
Qty: 15 TABLET | Refills: 0 | Status: SHIPPED | OUTPATIENT
Start: 2025-02-11 | End: 2025-02-16

## 2025-02-11 NOTE — PROGRESS NOTES
[facility]  Department of Surgery    PCP:  Vinny Rose MD    CHIEF COMPLAINT: left breast pain    Subjective:      Priya Conrad is a 39 y.o. premenopausal female referred for evaluation of breast pain. Change was noted a few months ago.  Patient does routinely do self breast exams.  Patient has noted a change on breast exam.  Patient denies nipple discharge. Patient denies to previous breast biopsy. Patient denies a personal history of breast cancer.    Interval History 24:  Patient presents for routine follow up of her lateral left breast pain. Patient states she has tried the primrose oil without relief of her sxs. Still occur daily. She also notes that her cycles remain irregular. She is planning to start OCP's when her next cycle comes in hopes of managing. Otherwise no new breast complaints such as new masses or skin changes.     GYN History:  Age of menarche was 11. LMP: current. Patient denies hormonal therapy. Patient is . Age of first live birth was 17.  Patient did breast feed 2nd child for 1 month.    Interval History:   Patient presents with new left breast itchiness x 2 weeks. Reports associated skin changes. It is located in the breast and underarm. It is constant. Has tried anti-itch cream, allergy medications without resolution.       Past Medical History:   Diagnosis Date    Anxiety     Heart murmur     Hypertension     Migraine      Past Surgical History:   Procedure Laterality Date     SECTION      COLONOSCOPY  10/20/2021    COLONOSCOPY N/A 10/20/2021    Procedure: COLONOSCOPY 2 day golytely;  Surgeon: Zheng Woodruff MD;  [facility];  Service: Endoscopy;  Laterality: N/A;    EPIDURAL STEROID INJECTION INTO CERVICAL SPINE N/A 2023    Procedure: C7-T1 LIZANDRO;  Surgeon: Eric Jessica MD;  Location: [facility] PAIN MANAGEMENT;  Service: Pain Management;  Laterality: N/A;  30 mins    EPIDURAL STEROID INJECTION INTO CERVICAL SPINE N/A  12/19/2023    Procedure: C7-T1 LIZANDRO;  Surgeon: Jose Metcalf DO;  Location: [facility];  Service: Pain Management;  Laterality: N/A;  15 mins    ESOPHAGOGASTRODUODENOSCOPY N/A 10/20/2021    Procedure: EGD (ESOPHAGOGASTRODUODENOSCOPY);  Surgeon: Zheng Woodruff MD;  Location: [facility] ENDO;  Service: Endoscopy;  Laterality: N/A;    FLEXIBLE SIGMOIDOSCOPY N/A 3/4/2024    Procedure: SIGMOIDOSCOPY, FLEXIBLE;  Surgeon: KOKO Naylor MD;  Location: [facility] OR 2ND FLR;  Service: Colon and Rectal;  Laterality: N/A;    LAPAROSCOPIC APPENDECTOMY N/A 7/10/2023    Procedure: APPENDECTOMY, LAPAROSCOPIC;  Surgeon: Ki De La Fuente MD;  Location: [facility] OR;  Service: General;  Laterality: N/A;    LAPAROSCOPIC TOTAL COLECTOMY N/A 3/4/2024    Procedure: COLECTOMY, TOTAL, LAPAROSCOPIC,ERAS low;  Surgeon: KOKO Naylor MD;  Location: [facility] OR 2ND FLR;  Service: Colon and Rectal;  Laterality: N/A;    TUBAL LIGATION      Tummy Tuck      UPPER GASTROINTESTINAL ENDOSCOPY  10/20/2021     Current Outpatient Medications on File Prior to Visit   Medication Sig Dispense Refill    amLODIPine (NORVASC) 10 MG tablet Take 1 tablet (10 mg total) by mouth once daily. 90 tablet 4    cholecalciferol, vitamin D3, 1,250 mcg (50,000 unit) capsule Take 50,000 Units by mouth every 7 days.      ergocalciferol (ERGOCALCIFEROL) 50,000 unit Cap Take 50,000 Units by mouth every 7 days.      pantoprazole (PROTONIX) 40 MG tablet Take 1 tablet (40 mg total) by mouth 2 (two) times daily. 60 tablet 2    propranoloL (INDERAL LA) 120 MG 24 hr capsule Take 120 mg by mouth once daily.       No current facility-administered medications on file prior to visit.     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[place]     Social Drivers of Health     Financial Resource Strain: Medium Risk (4/19/2024)    Overall Financial Resource Strain (CARDIA)     Difficulty of Paying Living Expenses: Somewhat hard   Food Insecurity: Food Insecurity Present (4/19/2024)    Hunger Vital Sign     Worried About Running Out of Food in the Last Year: Never true     Ran Out of Food in the Last Year: Sometimes true   Transportation Needs: No Transportation Needs (4/19/2024)    PRAPARE - Transportation     Lack of Transportation (Medical): No     Lack of Transportation (Non-Medical): No   Physical Activity: Insufficiently Active (4/19/2024)    Exercise Vital Sign     Days of Exercise per Week: 3 days     Minutes of Exercise per Session: 40 min   Stress: Stress Concern Present (4/19/2024)    [employer] [employer] - Occupational Stress Questionnaire     Feeling of Stress : Very much   Housing Stability: Unknown (3/5/2024)    Housing Stability Vital Sign     Unable to Pay for Housing in the Last Year: No     Unstable Housing in the Last Year: No     Family History   Problem Relation Name Age of Onset    Hypertension Mother      Heart failure Maternal Grandmother      Heart failure Maternal Grandfather      Breast cancer Paternal Aunt  45    Breast cancer Paternal Cousin  42    Colon cancer Maternal Aunt  50    Ovarian cancer Neg Hx         Review of Systems  Review of Systems   Constitutional:  Negative for chills, fever and malaise/fatigue.   HENT:  Negative for congestion.    Eyes:  Negative for discharge.   Respiratory:  Negative for cough, shortness of breath and stridor.    Cardiovascular:  Negative for chest pain and palpitations.   Gastrointestinal:  Negative for abdominal  "pain and nausea.   Neurological:  Negative for headaches.   Psychiatric/Behavioral:  The patient is not nervous/anxious.           Objective:        BP (!) 142/93   Pulse 71   Ht 5' 5" (1.651 m)   Wt 86.3 kg (190 lb 4.1 oz)   LMP 01/22/2025   SpO2 100%   BMI 31.66 kg/m²   Physical Exam   Vitals reviewed.  Constitutional: She is oriented to person, place, and time.   HENT:   Head: Normocephalic and atraumatic.   Nose: Nose normal.   Eyes: Pupils are equal, round, and reactive to light. Right eye exhibits no discharge. Left eye exhibits no discharge.   Pulmonary/Chest: Effort normal and breath sounds normal. No stridor. No respiratory distress. She exhibits no mass, no tenderness and no edema. Right breast exhibits no inverted nipple, no mass, no nipple discharge, no skin change and no tenderness. Left breast exhibits no inverted nipple, no mass, no nipple discharge, no skin change and no tenderness. No breast swelling or bleeding. Breasts are symmetrical.   Abdominal: Normal appearance.   Genitourinary: No breast swelling or bleeding.   Neurological: She is alert and oriented to person, place, and time.   Skin: Skin is warm and dry.     Psychiatric: Her behavior is normal. Mood, judgment and thought content normal.         Radiology review: Images personally reviewed by me in the clinic.    10/10/2024 Mammo Digital Diagnostic Left with Umang     History:  Patient is 39 y.o. and is seen for diagnostic imaging.        Films Compared:  Compared to: 04/30/2024 Mammo Digital Diagnostic Left with Umang, 04/30/2024 US Breast Left Limited, and 04/17/2024 Mammo Digital Screening Bilat w/ Umang      Findings:  This procedure was performed using tomosynthesis.   Computer-aided detection was utilized in the interpretation of this examination.     The breasts are heterogeneously dense, which may obscure small masses. There is no evidence of suspicious masses, microcalcifications or architectural distortion.      Impression: "   No mammographic evidence of malignancy.     BI-RADS Category 1: Negative     Recommendation:  Return to annual screening mammogram schedule is recommended      4/17/24 Bilateral Screening Mammo:  Findings:  This procedure was performed using tomosynthesis. Computer-aided detection was utilized in the interpretation of this examination.  The breasts are heterogeneously dense, which may obscure small masses.      Left  There is an asymmetry seen in the outer region of the left breast in the anterior depth on the CC view.      Right  There is no evidence of suspicious masses, calcifications, or other abnormal findings in the right breast.     Impression:  Left  Asymmetry: Left breast asymmetry at the outer anterior position. Assessment: 0 - Incomplete. Diagnostic Mammogram and/or Ultrasound is recommended.      Right  There is no mammographic evidence of malignancy in the right breast.     BI-RADS Category:   Overall: 0 - Incomplete: Needs Additional Imaging Evaluation        Recommendation:  Diagnostic mammogram with possible ultrasound (if indicated) is recommended.      4/30/24 Left Diagnostic Mammo/US Left:    Findings:  This procedure was performed using tomosynthesis. Computer-aided detection was utilized in the interpretation of this examination.  Mammo Digital Diagnostic Left with Umang  The left breast is heterogeneously dense, which may obscure small masses.  There is focal asymmetry seen in the outer region of the left breast in the anterior depth.      US Breast Left Limited  There is duct ectasia seen in the outer region of the left breast at 4 o'clock, 2 cm from the nipple. The duct ectasia correlates with the mammogram finding.  No evidence of suspicious masses.     Impression:  Mammo Digital Diagnostic Left with Umang  There is no mammographic evidence of malignancy in the left breast.     US Breast Left Limited  There is no sonographic evidence of malignancy in the left breast.     BI-RADS Category:    Overall: 2 - Benign        Recommendation:  Routine screening mammogram in 1 year is recommended.    Assessment:      Priya Conrad is a 39 y.o. premenopausal female with left breast skin changes, allergic dermatitis.      Plan:     Hydroxyzine   Claritin   Underwent a steroid injection today with PCP  Aquaphor   F/u in a few days       The patient is in agreement with the plan. Questions were encouraged and answered to patient's satisfaction. Priya will call our office with any questions or concerns.

## 2025-02-13 ENCOUNTER — PATIENT MESSAGE (OUTPATIENT)
Dept: SURGERY | Facility: CLINIC | Age: 40
End: 2025-02-13
Payer: COMMERCIAL

## 2025-02-17 ENCOUNTER — PATIENT MESSAGE (OUTPATIENT)
Dept: SURGERY | Facility: CLINIC | Age: 40
End: 2025-02-17
Payer: COMMERCIAL

## 2025-05-17 ENCOUNTER — HOSPITAL ENCOUNTER (OUTPATIENT)
Dept: RADIOLOGY | Facility: HOSPITAL | Age: 40
Discharge: HOME | End: 2025-05-17
Attending: FAMILY MEDICINE
Payer: COMMERCIAL

## 2025-05-17 VITALS — WEIGHT: 190 LBS | BODY MASS INDEX: 31.65 KG/M2 | HEIGHT: 65 IN

## 2025-05-17 DIAGNOSIS — Z12.31 VISIT FOR SCREENING MAMMOGRAM: ICD-10-CM

## 2025-05-17 PROCEDURE — 77067 SCR MAMMO BI INCL CAD: CPT | Mod: 26,,, | Performed by: RADIOLOGY

## 2025-05-17 PROCEDURE — 77067 SCR MAMMO BI INCL CAD: CPT | Mod: TC

## 2025-05-17 PROCEDURE — 77063 BREAST TOMOSYNTHESIS BI: CPT | Mod: 26,,, | Performed by: RADIOLOGY

## (undated) DEVICE — TROCAR ENDOPATH XCEL 5MM 7.5CM

## (undated) DEVICE — DRAPE INCISE IOBAN 2 23X17IN

## (undated) DEVICE — LEGGING CLEAR POLY 2/PACK

## (undated) DEVICE — TUBING HF INSUFFLATION W/ FLTR

## (undated) DEVICE — TROCAR ENDOPATH XCEL 5X75MM

## (undated) DEVICE — NDL HYPO REG 25G X 1 1/2

## (undated) DEVICE — SUPPORT ULNA NERVE PROTECTOR

## (undated) DEVICE — SUT COATED VICRYL 4/0 27IN

## (undated) DEVICE — STAPLER CONTOUR 40MM GREEN

## (undated) DEVICE — CUTTER PROXIMATE BLUE 75MM

## (undated) DEVICE — SUT MONOCYRL 4-0 PS2 UND

## (undated) DEVICE — BANDAGE ADHESIVE PLAS STRL 1X3

## (undated) DEVICE — STAPLER INT LINEAR ARTC 3.5-45

## (undated) DEVICE — POUCH SENSURA MIO 3/8X2 1/8IN

## (undated) DEVICE — DISSECTOR EPIX LAPA 5MMX35CM

## (undated) DEVICE — DRAPE ABDOMINAL TIBURON 14X11

## (undated) DEVICE — KIT ANTIFOG W/SPONG & FLUID

## (undated) DEVICE — SUT 0 VICRYL / UR6 (J603)

## (undated) DEVICE — MARKER SKIN STND TIP BLUE BARR

## (undated) DEVICE — RELOAD CONTOUR 40MM GREEN

## (undated) DEVICE — SOL NS 1000CC

## (undated) DEVICE — SUT 3/0 27IN PDS II VIO MO

## (undated) DEVICE — SEE MEDLINE ITEM 156902

## (undated) DEVICE — NDL BOX COUNTER

## (undated) DEVICE — BAG TISSUE RETRIEVAL 225ML

## (undated) DEVICE — TIP YANKAUERS BULB NO VENT

## (undated) DEVICE — SUT CTD VICRYL VIL BR CR/SH

## (undated) DEVICE — MANIFOLD 4 PORT

## (undated) DEVICE — SYS CLSR WND ENDOSCP XL

## (undated) DEVICE — SEALER LIGASURE LAP 37CM 5MM

## (undated) DEVICE — KIT VUETIP TROCAR SWAB

## (undated) DEVICE — TOWEL OR DISP STRL BLUE 4/PK

## (undated) DEVICE — TROCAR ENDOPATH XCEL 12X100MM

## (undated) DEVICE — KIT GELPORT LAPAROSCOPIC ABD

## (undated) DEVICE — STAPLER CIRCULAR 25MM

## (undated) DEVICE — BOWL STERILE LARGE 32OZ

## (undated) DEVICE — Device

## (undated) DEVICE — TUBING SUCTION STERILE

## (undated) DEVICE — LUBRICANT SURGILUBE 2 OZ

## (undated) DEVICE — COVER MAYO STND XL 30X57IN

## (undated) DEVICE — GLOVE SURGICAL LATEX SZ 8

## (undated) DEVICE — CONTAINER MULTIPURPOSE/SPECIME

## (undated) DEVICE — DRAPE CORETEMP FLD WRM 56X62IN

## (undated) DEVICE — COVER LIGHT HANDLE 80/CA

## (undated) DEVICE — TRAY CATH FOL SIL URIMTR 16FR

## (undated) DEVICE — ELECTRODE REM PLYHSV RETURN 9

## (undated) DEVICE — PAD PINK TRENDELENBURG POS XL

## (undated) DEVICE — TRAY SKIN SCRUB WET PREMIUM